# Patient Record
Sex: FEMALE | Race: WHITE | NOT HISPANIC OR LATINO | Employment: PART TIME | URBAN - METROPOLITAN AREA
[De-identification: names, ages, dates, MRNs, and addresses within clinical notes are randomized per-mention and may not be internally consistent; named-entity substitution may affect disease eponyms.]

---

## 2017-01-09 ENCOUNTER — CARE COORDINATION (OUTPATIENT)
Dept: CARDIOLOGY | Facility: CLINIC | Age: 37
End: 2017-01-09

## 2017-01-09 DIAGNOSIS — I42.8 ARRHYTHMOGENIC RIGHT VENTRICULAR DYSPLASIA (H): Primary | ICD-10-CM

## 2017-01-11 ENCOUNTER — TELEPHONE (OUTPATIENT)
Dept: CARDIOLOGY | Facility: CLINIC | Age: 37
End: 2017-01-11

## 2017-01-30 ENCOUNTER — CARE COORDINATION (OUTPATIENT)
Dept: CARDIOLOGY | Facility: CLINIC | Age: 37
End: 2017-01-30

## 2017-01-30 NOTE — PROGRESS NOTES
Patient states that her insurance changed and we are now out of network. She does not want follow-up at this time. Patient provided with clinic number is insurance changes.    Allison Jones RN, BSN  Cardiology Care Coordinator  [facility]  [email or web address]  [phone]

## 2017-05-26 ENCOUNTER — HEALTH MAINTENANCE LETTER (OUTPATIENT)
Age: 37
End: 2017-05-26

## 2018-03-14 NOTE — PROGRESS NOTES
SUBJECTIVE:   CC: Maren Bee is an 37 year old woman who presents for preventive health visit. Her PCP is Dr. Wilde though she has not been seen here for a couple years. She is due for a PAP test and has the following issues she would like to discuss:    1. Thyroid labs  2. Rash on bilateral arms and legs  3. Needs pap and a referral to cardiology    4. Acute Dermatitis:  Patient has a history of generally sensitive skin. She complains of an acute rash that started several days ago while she was in [place]. The rash is primarily located on the sun exposed areas. She has been using sunscreens and other new lotions. She was out in the sun more than normal on her vacation. Rash is red and itchy but not inflamed and painful.    5. Referral to cardiology:  Patient has seen cardiology in the past. She has a history of a brother who had an episode of aborted sudden cardiac death. He was subsequently found to have a are the sea with positive test for the DSP gene. The patient has also tested positive for the same mutation. Patient has had a cardiac MRI and Holter monitor in  I believe. She has not seen cardiology in the last year or two and would like a referral to return therefore evaluation.    GYN history:  Menarche:  Periods: regular, lasting 5days.  Flow described as light  no dysmenorrhea, no Dysparuneia  Currently sexually active: yes    Contraception: with vasectomy  Patient's last menstrual period was 2018 (exact date).  Vaginal symptoms: none  Concern for STD: no    Accepts/requests STD testing: declines  PAP test has been normal.      Healthy Habits:    Do you get at least three servings of calcium containing foods daily (dairy, green leafy vegetables, etc.)? yes    Amount of exercise or daily activities, outside of work: 20 min per day 4 days per week    Problems taking medications regularly No    Medication side effects: No    Have you had an eye exam in the past two years?  yes    Do you see a dentist twice per year? yes    Do you have sleep apnea, excessive snoring or daytime drowsiness?no        Today's PHQ-2 Score:   PHQ-2 ( 1999 [employer]) 4/2/2018 4/26/2016   Q1: Little interest or pleasure in doing things 0 0   Q2: Feeling down, depressed or hopeless 0 0   PHQ-2 Score 0 0       Patient Active Problem List    Diagnosis Date Noted     Hypothyroidism 03/15/2016     Priority: Medium     Arrhythmogenic left ventricular dysplasia (H) 02/22/2016     Priority: Medium     Seen on MRI  Cardiologist started Beta blocker, consider ICD, she is referred to EP specialist       Contact dermatitis 12/14/2012     Priority: Medium     Keratosis pilaris 12/14/2012     Priority: Medium     Cardiomyopathy, familial (H) 07/18/2012     Priority: Medium     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         Past Medical History:   Diagnosis Date     Cardiomyopathy, familial (H) 7/18/2012    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      Hypothyroidism        Past Surgical History:   Procedure Laterality Date     DENTAL SURGERY       INSERT INTRAUTERINE DEVICE  2011    mirena     Strabismus Surgery         Family History   Problem Relation Age of Onset     CANCER Maternal Grandfather      Bladder     Other - See Comments Mother 58     hyperlipidemia, hypothyroidism, ARVD gene +     Other - See Comments Father      hyperlipidemia, hypothyroidism     Lupus Maternal Grandmother      Other - See Comments Brother 32     (Amari) ARVD s/p sudden cardiac arrest     Cardiovascular Brother 30     (Young) MI at age 30; type 1 DM; ARVD gene negative;      Other - See Comments Other      Alcohol Abuse     DIABETES Daughter 3     type I       Social History   Substance Use Topics     Smoking status: Former Smoker     Years: 2.00     Types: Cigarettes     Quit date: 4/10/2001     Smokeless tobacco: Never Used      Comment: 4 cig per day     Alcohol use 1.2 oz/week     2 Standard drinks or  equivalent per week       Social History     Social History Narrative    Lives with  and 2 daughter.  Ages 8, 11.  The 11 year old has type 1 diabetes. One cat.        .Has a good support system.    Feels safe in all environments.    Wears seatbelt 100% of the time    Wears helmet while biking.    Denies history of abuse, past or present, physical, sexual or emotional.    04/02/18    Vale Ashraf PA-C           Current Outpatient Prescriptions   Medication Sig Dispense Refill     triamcinolone (KENALOG) 0.1 % cream Apply topically 2 times daily 80 g 1     levothyroxine (SYNTHROID/LEVOTHROID) 200 MCG tablet Take one daily; skip dose on Sunday 90 tablet 1     [DISCONTINUED] levothyroxine (SYNTHROID, LEVOTHROID) 175 MCG tablet Take one daily; skip dose on Sunday 30 tablet 0     metoprolol (TOPROL-XL) 25 MG 24 hr tablet Take 2 tablets (50 mg) by mouth daily (Patient not taking: Reported on 4/2/2018) 90 tablet 1     cholecalciferol (VITAMIN  -D) 1000 UNITS capsule Take 1 capsule (1,000 Units) by mouth daily 30 capsule                           Reviewed orders with patient.  Reviewed health maintenance and updated orders accordingly - Yes    Patient under age 50, mutual decision reflected in health maintenance.      Pertinent mammograms are reviewed under the imaging tab.  History of abnormal Pap smear: NO - age 30-65 PAP every 5 years with negative HPV co-testing recommended    Reviewed and updated as needed this visit by clinical staff  Tobacco  Allergies  Meds  Problems  Med Hx  Surg Hx  Fam Hx  Soc Hx          Reviewed and updated as needed this visit by Provider  Tobacco  Allergies  Meds  Problems  Med Hx  Surg Hx  Fam Hx  Soc Hx             ROS:  C: NEGATIVE for fever, chills, change in weight  I: NEGATIVE for worrisome rashes, moles or lesions  E: NEGATIVE for vision changes or irritation  ENT: NEGATIVE for ear, mouth and throat problems  R: NEGATIVE for significant cough or SOB  B: NEGATIVE  "for masses, tenderness or discharge  CV: NEGATIVE for chest pain, palpitations or peripheral edema  GI: NEGATIVE for nausea, abdominal pain, heartburn, or change in bowel habits  : NEGATIVE for unusual urinary or vaginal symptoms. Periods are regular.  M: NEGATIVE for significant arthralgias or myalgia  N: NEGATIVE for weakness, dizziness or paresthesias  E: NEGATIVE for temperature intolerance, skin/hair changes  H: NEGATIVE for bleeding problems  P: NEGATIVE for changes in mood or affect    OBJECTIVE:   BP 98/63 (BP Location: Right arm, Patient Position: Chair, Cuff Size: Adult Regular)  Pulse 76  Temp 97.7  F (36.5  C) (Temporal)  Ht 5' 5.47\" (166.3 cm)  Wt 143 lb 12 oz (65.2 kg)  LMP 03/21/2018 (Exact Date)  SpO2 100%  BMI 23.58 kg/m2  EXAM:  GENERAL: healthy, alert and no distress  EYES: Eyes grossly normal to inspection, PERRL and conjunctivae and sclerae normal  HENT: ear canals and TM's normal, nose and mouth without ulcers or lesions  NECK: no adenopathy, no asymmetry, masses, or scars and thyroid normal to palpation  RESP: lungs clear to auscultation - no rales, rhonchi or wheezes  BREAST: normal without masses, tenderness or nipple discharge and no palpable axillary masses or adenopathy  CV: regular rate and rhythm, normal S1 S2, no S3 or S4, no murmur, click or rub, no peripheral edema and peripheral pulses strong  ABDOMEN: soft, nontender, no hepatosplenomegaly, no masses and bowel sounds normal   (female): normal female external genitalia, normal urethral meatus, vaginal mucosa pink, moist, well rugated, and normal cervix/adnexa/uterus without masses or discharge  MS: no gross musculoskeletal defects noted, no edema  SKIN: Macula papular rash with areas of confluence primarily noted on sun exposed areas upper and lower extremities back of patient's neck and on shoulders. Significant coalescence in popliteal fossa bilaterally. Mild scaling noted. No associated pain lymphangitis or " "lymphadenapathy  NEURO: Normal strength and tone, mentation intact and speech normal  PSYCH: mentation appears normal, affect normal/bright  LYMPH: no cervical, supraclavicular, axillary, or inguinal adenopathy    ASSESSMENT/PLAN:       ICD-10-CM    1. Encounter for routine adult health examination without abnormal findings Z00.00    2. Acquired hypothyroidism E03.9 TSH with free T4 reflex     TSH with free T4 reflex   3. Other atopic dermatitis L20.89 triamcinolone (KENALOG) 0.1 % cream   4. Cardiomyopathy, familial (H) I42.9 CARDIOLOGY EVAL ADULT REFERRAL   5. Screening for cervical cancer Z12.4 Pap imaged thin layer screen with HPV - recommended age 30 - 65     HPV High Risk Types DNA Cervical   6. Screening for lipid disorders Z13.220 Lipid Panel ([employer])   7. Arrhythmogenic left ventricular dysplasia (H) I42.8 CARDIOLOGY EVAL ADULT REFERRAL   8. Breast screening Z12.31 Mammogram - routine screening     Related to contact dermatitis from sunscreens or possibly a solar keratosis.Recommended she used triamcinolone and can also take Zyrtec 10 mg once daily.    COUNSELING:   Reviewed preventive health counseling, as reflected in patient instructions  Special attention given to:        Regular exercise       Healthy diet/nutrition       Vision screening       Immunizations    Reviewed  Breast cancer screening reviewed. Patient requests screening after age 40 due to history of thick fibrous breasts.       Contraception       Osteoporosis Prevention/Bone Health          reports that she quit smoking about 16 years ago. Her smoking use included Cigarettes. She quit after 2.00 years of use. She has never used smokeless tobacco.    Estimated body mass index is 23.58 kg/(m^2) as calculated from the following:    Height as of this encounter: 5' 5.47\" (166.3 cm).    Weight as of this encounter: 143 lb 12 oz (65.2 kg).       Counseling Resources:  ATP IV Guidelines  Pooled Cohorts Equation Calculator  Breast Cancer Risk " Calculator  FRAX Risk Assessment  ICSI Preventive Guidelines  Dietary Guidelines for Americans, 2010  USDA's MyPlate  ASA Prophylaxis  Lung CA Screening    Betty Ashraf PA-C  [facility]

## 2018-03-14 NOTE — PATIENT INSTRUCTIONS
Welcome to [facility], where we are committed to the art of inspired primary care.  Thank you for choosing us to be a part of your well-being.    The clinic is open Monday through Friday, 8:00 a.m. - 5:00 p.m., and Saturdays from 8:00 a.m. - 12:00 p.m.  After-hours questions are directed to our 24-hour nurse line, which can be reached by calling the clinic at [phone].  You can also contact the clinic through [employer], our online patient portal.  (Please allow 1-2 business days for a response via [employer].)  If you are not already enrolled in [employer], access instructions are below.    If you need a refill on your prescription, please contact the pharmacy where you filled it, and they will contact our clinic with the details of what is needed.  If your prescription is a controlled substance, you will have a conversation with your provider to determine if you would like to  your prescription at the clinic or have it mailed to your pharmacy.  Please allow 2-3 business days for all refill requests to be handled.    We look forward to providing you with great care!  Please let me know if you have any questions.  Thank you for allowing me to participate in your care.  It was my pleasure meeting you.  Vale Ashraf PA-C    Preventive Health Recommendations  Female Ages 26 - 39  Yearly exam:   See your health care provider every year in order to    Review health changes.     Discuss preventive care.      Review your medicines if you your doctor has prescribed any.    Until age 30: Get a Pap test every three years (more often if you have had an abnormal result).    After age 30: Talk to your doctor about whether you should have a Pap test every 3 years or have a Pap test with HPV screening every 5 years.   You do not need a Pap test if your uterus was removed (hysterectomy) and you have not had cancer.  You should be tested each year for STDs (sexually transmitted diseases), if  you're at risk.   Talk to your provider about how often to have your cholesterol checked.  If you are at risk for diabetes, you should have a diabetes test (fasting glucose).  Shots: Get a flu shot each year. Get a tetanus shot every 10 years.   Nutrition:     Eat at least 5 servings of fruits and vegetables each day.    Eat whole-grain bread, whole-wheat pasta and brown rice instead of white grains and rice.    Talk to your provider about Calcium and Vitamin D.     Lifestyle    Exercise at least 150 minutes a week (30 minutes a day, 5 days of the week). This will help you control your weight and prevent disease.    Limit alcohol to one drink per day.    No smoking.     Wear sunscreen to prevent skin cancer.    See your dentist every six months for an exam and cleaning.

## 2018-03-15 ENCOUNTER — TELEPHONE (OUTPATIENT)
Dept: FAMILY MEDICINE | Facility: CLINIC | Age: 38
End: 2018-03-15

## 2018-03-15 NOTE — TELEPHONE ENCOUNTER
I called and left message that we will draw blood at her appointment and then Betty Ashraf will get back to her with in 24 hours with the results.  Chloe CABRAL  [facility]  March 15, 2018 2:47 PM      ----- Message from Gayle Joaquin sent at 3/14/2018 12:20 PM CDT -----  Regarding: Pt has an upcoming appt on 04/02/18 and would like lab orders to get them done before the appt  Contact: [phone]  Pt has an upcoming appt on 04/02/18 and would like lab orders to get them done before the appt.  [facility] pt [facility] she wants them done before appt so they can adjust her thyroid medication correctly.  Pt wants a call back at the number listed above    Thank you,    Laura BARAKAT  Call Ctr    Please DO NOT send this message and/or reply back to sender.  Call Center Representatives DO NOT respond to messages.

## 2018-04-02 ENCOUNTER — OFFICE VISIT (OUTPATIENT)
Dept: FAMILY MEDICINE | Facility: CLINIC | Age: 38
End: 2018-04-02
Payer: COMMERCIAL

## 2018-04-02 VITALS
SYSTOLIC BLOOD PRESSURE: 98 MMHG | HEIGHT: 65 IN | HEART RATE: 76 BPM | BODY MASS INDEX: 23.95 KG/M2 | OXYGEN SATURATION: 100 % | TEMPERATURE: 97.7 F | DIASTOLIC BLOOD PRESSURE: 63 MMHG | WEIGHT: 143.75 LBS

## 2018-04-02 DIAGNOSIS — I42.9 CARDIOMYOPATHY, FAMILIAL (H): ICD-10-CM

## 2018-04-02 DIAGNOSIS — I42.8 ARRHYTHMOGENIC LEFT VENTRICULAR DYSPLASIA (H): ICD-10-CM

## 2018-04-02 DIAGNOSIS — Z12.4 SCREENING FOR CERVICAL CANCER: ICD-10-CM

## 2018-04-02 DIAGNOSIS — Z12.39 BREAST SCREENING: ICD-10-CM

## 2018-04-02 DIAGNOSIS — E78.5 HYPERLIPIDEMIA WITH TARGET LDL LESS THAN 160: ICD-10-CM

## 2018-04-02 DIAGNOSIS — E03.9 ACQUIRED HYPOTHYROIDISM: ICD-10-CM

## 2018-04-02 DIAGNOSIS — Z13.220 SCREENING FOR LIPID DISORDERS: ICD-10-CM

## 2018-04-02 DIAGNOSIS — L20.89 OTHER ATOPIC DERMATITIS: ICD-10-CM

## 2018-04-02 DIAGNOSIS — Z00.00 ENCOUNTER FOR ROUTINE ADULT HEALTH EXAMINATION WITHOUT ABNORMAL FINDINGS: Primary | ICD-10-CM

## 2018-04-02 LAB
CHOLEST SERPL-MCNC: 345 MG/DL (ref 0–200)
CHOLEST/HDLC SERPL: 3.9 {RATIO} (ref 0–5)
FASTING SPECIMEN: YES
HDLC SERPL-MCNC: 89 MG/DL
LDLC SERPL CALC-MCNC: 240 MG/DL (ref 0–129)
TRIGL SERPL-MCNC: 82 MG/DL (ref 0–150)
VLDL-CHOLESTEROL: 16 (ref 7–32)

## 2018-04-02 RX ORDER — TRIAMCINOLONE ACETONIDE 1 MG/G
CREAM TOPICAL 2 TIMES DAILY
Qty: 80 G | Refills: 1 | Status: SHIPPED | OUTPATIENT
Start: 2018-04-02 | End: 2022-01-07

## 2018-04-02 RX ORDER — LEVOTHYROXINE SODIUM 200 UG/1
TABLET ORAL
Qty: 90 TABLET | Refills: 1 | Status: SHIPPED | OUTPATIENT
Start: 2018-04-02 | End: 2018-09-21

## 2018-04-02 ASSESSMENT — PAIN SCALES - GENERAL: PAINLEVEL: NO PAIN (0)

## 2018-04-02 NOTE — NURSING NOTE
"37 year old  Chief Complaint   Patient presents with     [facility] Care     Physical     Wants her thyroid checked and rash on bilateral arms and legs.       Blood pressure 98/63, pulse 76, temperature 97.7  F (36.5  C), temperature source Temporal, height 5' 5.47\" (166.3 cm), weight 143 lb 12 oz (65.2 kg), last menstrual period 03/21/2018, SpO2 100 %, not currently breastfeeding. Body mass index is 23.58 kg/(m^2).  Patient Active Problem List   Diagnosis     Cardiomyopathy, familial (H)     Contact dermatitis     Keratosis pilaris     Arrhythmogenic left ventricular dysplasia (H)     Hypothyroidism       Wt Readings from Last 2 Encounters:   04/02/18 143 lb 12 oz (65.2 kg)   04/26/16 139 lb (63 kg)     BP Readings from Last 3 Encounters:   04/02/18 98/63   04/26/16 107/79   02/12/16 99/62         Current Outpatient Prescriptions   Medication     levothyroxine (SYNTHROID, LEVOTHROID) 175 MCG tablet     metoprolol (TOPROL-XL) 25 MG 24 hr tablet     cholecalciferol (VITAMIN  -D) 1000 UNITS capsule     No current facility-administered medications for this visit.        Social History   Substance Use Topics     Smoking status: Former Smoker     Years: 2.00     Types: Cigarettes     Quit date: 4/10/2001     Smokeless tobacco: Never Used      Comment: 4 cig per day     Alcohol use Yes      Comment: occ.       Health Maintenance Due   Topic Date Due     PAP Q5 YEARS  07/07/2008     HPV Q5 YEARS (Complete with PAP)  07/07/2008     PAP Q3 YR  03/06/2015     TSH W/ FREE T4 REFLEX Q1 YEAR  03/15/2017       No results found for: PAP    Linsey Obrien MA  April 2, 2018 8:59 AM    "

## 2018-04-02 NOTE — MR AVS SNAPSHOT
After Visit Summary   4/2/2018    Maren Bee    MRN: [id number]           Patient Information     Date Of Birth          1980        Visit Information        Provider Department      4/2/2018 8:40 AM Betty Ashraf PA-C [facility]        Today's Diagnoses     Encounter for routine adult health examination without abnormal findings    -  1    Acquired hypothyroidism        Other atopic dermatitis        Screening for cervical cancer        Screening for lipid disorders        Cardiomyopathy, familial (H)        Arrhythmogenic left ventricular dysplasia (H)        Breast screening        Hypothyroidism, unspecified type          Care Instructions    Welcome to [facility], where we are committed to the art of [facility] primary care.  Thank you for choosing us to be a part of your well-being.    The clinic is open Monday through Friday, 8:00 a.m. - 5:00 p.m., and Saturdays from 8:00 a.m. - 12:00 p.m.  After-hours questions are directed to our 24-hour nurse line, which can be reached by calling the clinic at [phone].  You can also contact the clinic through [email or web address], our online patient portal.  (Please allow 1-2 business days for a response via [email or web address].)  If you are not already enrolled in [email or web address], access instructions are below.    If you need a refill on your prescription, please contact the pharmacy where you filled it, and they will contact our clinic with the details of what is needed.  If your prescription is a controlled substance, you will have a conversation with your provider to determine if you would like to  your prescription at the clinic or have it mailed to your pharmacy.  Please allow 2-3 business days for all refill requests to be handled.    We look forward to providing you with great care!  Please let me know if you have any questions.  Thank you for allowing me to participate in your care.  It was my pleasure meeting  you.  Vale Ashraf PA-C    Preventive Health Recommendations  Female Ages 26 - 39  Yearly exam:   See your health care provider every year in order to    Review health changes.     Discuss preventive care.      Review your medicines if you your doctor has prescribed any.    Until age 30: Get a Pap test every three years (more often if you have had an abnormal result).    After age 30: Talk to your doctor about whether you should have a Pap test every 3 years or have a Pap test with HPV screening every 5 years.   You do not need a Pap test if your uterus was removed (hysterectomy) and you have not had cancer.  You should be tested each year for STDs (sexually transmitted diseases), if you're at risk.   Talk to your provider about how often to have your cholesterol checked.  If you are at risk for diabetes, you should have a diabetes test (fasting glucose).  Shots: Get a flu shot each year. Get a tetanus shot every 10 years.   Nutrition:     Eat at least 5 servings of fruits and vegetables each day.    Eat whole-grain bread, whole-wheat pasta and brown rice instead of white grains and rice.    Talk to your provider about Calcium and Vitamin D.     Lifestyle    Exercise at least 150 minutes a week (30 minutes a day, 5 days of the week). This will help you control your weight and prevent disease.    Limit alcohol to one drink per day.    No smoking.     Wear sunscreen to prevent skin cancer.    See your dentist every six months for an exam and cleaning.            Follow-ups after your visit        Additional Services     CARDIOLOGY EVAL ADULT REFERRAL       Preferred location:  [facility] [phone]   [email or web address]    Patient would like to re-establish care.  Has seen Dr. Medrano in the past.    Please be aware that coverage of these services is subject to the terms and limitations of your health insurance plan.  Call member  "services at your health plan with any benefit or coverage questions.      Please bring the following to your appointment:  Any x-rays, CTs or MRIs which have been performed. Contact the facility where they were done to arrange for  prior to your scheduled appointment.    List of current medications  This referral request   Any documents/labs given to you for this referral                  Future tests that were ordered for you today     Open Future Orders        Priority Expected Expires Ordered    TSH with free T4 reflex Routine 5/7/2018 4/2/2019 4/2/2018    Mammogram - routine screening Routine  4/2/2019 4/2/2018            Who to contact     Please call your clinic at [phone] to:    Ask questions about your health    Make or cancel appointments    Discuss your medicines    Learn about your test results    Speak to your doctor            Additional Information About Your Visit        Improveit! 360 Information     Improveit! 360 gives you secure access to your electronic health record. If you see a primary care provider, you can also send messages to your care team and make appointments. If you have questions, please call your primary care clinic.  If you do not have a primary care provider, please call [phone] and they will assist you.      Improveit! 360 is an electronic gateway that provides easy, online access to your medical records. With Improveit! 360, you can request a clinic appointment, read your test results, renew a prescription or communicate with your care team.     To access your existing account, please contact your [facility] or call [phone] for assistance.        Care EveryWhere ID     This is your Care EveryWhere ID. This could be used by other organizations to access your [facility] medical records  [id number]        Your Vitals Were     Pulse Temperature Height Last Period Pulse Oximetry BMI (Body Mass Index)    76 97.7  F (36.5  C) (Temporal) 5' 5.47\" (166.3 cm) " 03/21/2018 (Exact Date) 100% 23.58 kg/m2       Blood Pressure from Last 3 Encounters:   04/02/18 98/63   04/26/16 107/79   02/12/16 99/62    Weight from Last 3 Encounters:   04/02/18 143 lb 12 oz (65.2 kg)   04/26/16 139 lb (63 kg)   02/12/16 138 lb (62.6 kg)              We Performed the Following     CARDIOLOGY EVAL ADULT REFERRAL     HPV High Risk Types DNA Cervical     Lipid Panel (LabDAQ)     Pap imaged thin layer screen with HPV - recommended age 30 - 65     TSH with free T4 reflex          Today's Medication Changes          These changes are accurate as of 4/2/18 10:51 AM.  If you have any questions, ask your nurse or doctor.               Start taking these medicines.        Dose/Directions    triamcinolone 0.1 % cream   Commonly known as:  KENALOG   Used for:  Other atopic dermatitis   Started by:  Betty Ashraf PA-C        Apply topically 2 times daily   Quantity:  80 g   Refills:  1         These medicines have changed or have updated prescriptions.        Dose/Directions    levothyroxine 200 MCG tablet   Commonly known as:  SYNTHROID/LEVOTHROID   This may have changed:  medication strength   Used for:  Hypothyroidism, unspecified type   Changed by:  Betty Ashraf PA-C        Take one daily; skip dose on Sunday   Quantity:  90 tablet   Refills:  1         Stop taking these medicines if you haven't already. Please contact your care team if you have questions.     amoxicillin-clavulanate 875-125 MG per tablet   Commonly known as:  AUGMENTIN   Stopped by:  Betty Ashraf PA-C           pseudoePHEDrine 30 MG tablet   Commonly known as:  SUDAFED   Stopped by:  Betty Ashraf PA-C                Where to get your medicines      These medications were sent to [facility]/pharmacy #[id number] - [place], [place] [facility] [address] [facility] [address]     Phone:  [phone]     levothyroxine 200 MCG tablet    triamcinolone 0.1 % cream                Primary  Care Provider Office Phone # Fax #    Betty Ashraf PA-C [phone] [address] [place] [address]        Equal Access to Services     ELEN GATICA : Hadii john robertson dianao Sobrianali, waaxda luqadaha, qaybta kaalmada prudence, edwin healy ruben chamberlain. So [facility] [phone].    ATENCIÓN: Si habla español, tiene a arias disposición servicios gratuitos de asistencia lingüística. Llame al [phone].    We comply with applicable federal civil rights laws and [place] laws. We do not discriminate on the basis of race, color, national origin, age, disability, sex, sexual orientation, or gender identity.            Thank you!     Thank you for choosing [facility]  for your care. Our goal is always to provide you with excellent care. Hearing back from our patients is one way we can continue to improve our services. Please take a few minutes to complete the written survey that you may receive in the mail after your visit with us. Thank you!             Your Updated Medication List - Protect others around you: Learn how to safely use, store and throw away your medicines at www.disposemymeds.org.          This list is accurate as of 4/2/18 10:51 AM.  Always use your most recent med list.                   Brand Name Dispense Instructions for use Diagnosis    cholecalciferol 1000 UNITS capsule    vitamin  -D    30 capsule    Take 1 capsule (1,000 Units) by mouth daily        levothyroxine 200 MCG tablet    SYNTHROID/LEVOTHROID    90 tablet    Take one daily; skip dose on Sunday    Hypothyroidism, unspecified type       metoprolol succinate 25 MG 24 hr tablet    TOPROL-XL    90 tablet    Take 2 tablets (50 mg) by mouth daily    Arrhythmogenic right ventricular dysplasia (H)       triamcinolone 0.1 % cream    KENALOG    80 g    Apply topically 2 times daily    Other atopic dermatitis

## 2018-04-03 LAB
T4 FREE SERPL-MCNC: 0.37 NG/DL (ref 0.76–1.46)
TSH SERPL DL<=0.005 MIU/L-ACNC: 162 MU/L (ref 0.4–4)

## 2018-04-04 LAB
COPATH REPORT: ABNORMAL
PAP: ABNORMAL

## 2018-04-06 LAB
FINAL DIAGNOSIS: NORMAL
HPV HR 12 DNA CVX QL NAA+PROBE: NEGATIVE
HPV16 DNA SPEC QL NAA+PROBE: NEGATIVE
HPV18 DNA SPEC QL NAA+PROBE: NEGATIVE
SPECIMEN DESCRIPTION: NORMAL
SPECIMEN SOURCE CVX/VAG CYTO: NORMAL

## 2018-05-07 ENCOUNTER — TRANSFERRED RECORDS (OUTPATIENT)
Dept: HEALTH INFORMATION MANAGEMENT | Facility: CLINIC | Age: 38
End: 2018-05-07

## 2018-06-12 ENCOUNTER — MYC MEDICAL ADVICE (OUTPATIENT)
Dept: FAMILY MEDICINE | Facility: CLINIC | Age: 38
End: 2018-06-12

## 2018-06-12 ENCOUNTER — CARE COORDINATION (OUTPATIENT)
Dept: CARDIOLOGY | Facility: CLINIC | Age: 38
End: 2018-06-12

## 2018-06-12 DIAGNOSIS — R00.2 PALPITATIONS: ICD-10-CM

## 2018-06-12 DIAGNOSIS — E03.9 ACQUIRED HYPOTHYROIDISM: ICD-10-CM

## 2018-06-12 DIAGNOSIS — I42.8 ARRHYTHMOGENIC LEFT VENTRICULAR DYSPLASIA (H): Primary | ICD-10-CM

## 2018-06-12 DIAGNOSIS — E78.5 HYPERLIPIDEMIA LDL GOAL <70: ICD-10-CM

## 2018-06-12 DIAGNOSIS — E78.5 HYPERLIPIDEMIA WITH TARGET LDL LESS THAN 160: ICD-10-CM

## 2018-06-12 DIAGNOSIS — E03.9 HYPOTHYROIDISM, UNSPECIFIED TYPE: Primary | ICD-10-CM

## 2018-06-12 LAB
ALT SERPL-CCNC: 15 U/L (ref 0–50)
AST SERPL-CCNC: 27 U/L (ref 0–45)
CHOLEST SERPL-MCNC: 242 MG/DL (ref 0–200)
CHOLEST/HDLC SERPL: 3.6 {RATIO} (ref 0–5)
FASTING SPECIMEN: YES
GLUCOSE SERPL-MCNC: 96 MG/DL (ref 60–109)
HDLC SERPL-MCNC: 68 MG/DL
LDLC SERPL CALC-MCNC: 163 MG/DL (ref 0–129)
T4 FREE SERPL-MCNC: 0.98 NG/DL (ref 0.76–1.46)
TRIGL SERPL-MCNC: 56 MG/DL (ref 0–150)
TSH SERPL DL<=0.005 MIU/L-ACNC: 21.8 MU/L (ref 0.4–4)
VLDL-CHOLESTEROL: 11 (ref 7–32)

## 2018-06-12 RX ORDER — ATORVASTATIN CALCIUM 20 MG/1
20 TABLET, FILM COATED ORAL DAILY
Qty: 90 TABLET | Refills: 3 | Status: SHIPPED | OUTPATIENT
Start: 2018-06-12 | End: 2019-05-07

## 2018-06-12 NOTE — PROGRESS NOTES
Date: 6/12/2018    Time of Call: 12:33 PM     Diagnosis:  Patient called to schedule follow up, she has a recent history of palpitations with a visit to [facility] ER - records in Care Everywhere. Reviewed her records with Dr. Medrano.     [ TORB ] Ordering provider: Dr. Haylie Medrano   Order: Start Lipitor 20 mg for recent Lipid panel. Wear Ziopatch 14 day monitor for recent palpitations. Have cardiac MRI with stress. Follow up in [facility] clinic.      Order received by: Allison Jones      Follow-up/additional notes: Patient called and is agreeable to all recommendations. Meds sent to her preferred pharmacy. Will call if her palpitations worsen or become more symptomatic. Will work on MRI and appt with Dr. Medrano.    Allison Jones, RN, BSN  Cardiology Care Coordinator  [facility]  [email or web address]  [phone]

## 2018-06-14 ENCOUNTER — DOCUMENTATION ONLY (OUTPATIENT)
Dept: CARDIOLOGY | Facility: CLINIC | Age: 38
End: 2018-06-14

## 2018-06-14 NOTE — PROGRESS NOTES
Per patient request, on June 12, 2018 a Zio patch was mailed to the patient via clinic.  The patient is to wear the zio as soon as it arrives.    Arely Goddard  Periop Electrophysiology   [phone]

## 2018-06-15 ENCOUNTER — ALLIED HEALTH/NURSE VISIT (OUTPATIENT)
Dept: CARDIOLOGY | Facility: CLINIC | Age: 38
End: 2018-06-15
Attending: INTERNAL MEDICINE
Payer: COMMERCIAL

## 2018-06-15 DIAGNOSIS — I42.8 ARRHYTHMOGENIC LEFT VENTRICULAR DYSPLASIA (H): ICD-10-CM

## 2018-06-15 PROCEDURE — 0298T ZZC EXT ECG > 48HR TO 21 DAY REVIEW AND INTERPRETATN: CPT | Performed by: INTERNAL MEDICINE

## 2018-06-15 NOTE — MR AVS SNAPSHOT
After Visit Summary   6/15/2018    Maren Bee    MRN: [id number]           Patient Information     Date Of Birth          1980        Visit Information        Provider Department      6/15/2018 7:00 AM Tech, Uc Cvc Monitor, [facility]        Today's Diagnoses     Arrhythmogenic left ventricular dysplasia (H)           Follow-ups after your visit        Your next 10 appointments already scheduled     Jun 22, 2018  7:00 AM CDT   MR CARDIAC W CONTRAST STRESS AND FLOW with UUMR4, UU CV MR NURSE   [facility] [place], [facility] ([facility], [facility])    [address] [address] [phone]           Take your medicines as usual, unless your doctor tells you not to.   If you take Viagra, Levitra or Cialis, stop taking it 48 hours before your test.   If you take Aggrenox or dipyridamole (Persantine, Permole), stop taking it 48 hours before your test.   If you take Theophylline or Aminophylline, stop taking it 12 hours before your test.   If you are diabetic and take oral hypoglycemics, do not take them on the day of your test.  The day before your exam, drink extra fluids at least six 8-ounce glasses (unless your doctor wants you to limit your fluids).  Stop all caffeine 12 hours before the test. This includes coffee, tea, soda, chocolate and certain medicines (such as Anacin, Excedrin and NoDoz). Also avoid decaf coffee and tea, as these contain small amounts of caffeine.  Do not eat or drink for 3 hours before your exam. You may drink water and take your morning medicines.  You may need a blood test (creatinine test) within 30 days of your exam. Follow your doctor s orders if this is arranged before your exam.  If you are very claustrophobic, please let you doctor know. You may get a sedative medicine from your doctor before you arrive. Bring the medicine to the exam. Do not take it at home. Arrive one hour early. Bring  someone who can take you home after the test. Your medicine will make you sleepy. After the exam, you may not drive, take a bus or take a taxi by yourself.  The MRI machine uses a strong magnet. Please wear clothes without metal (snaps, zippers). A sweatsuit works well, or we may give you a hospital gown.  Please remove any body piercings and hair extensions before you arrive. You will remove watches, jewelry, hairpins, wallets, dentures, partial dental plates and hearing aids. You may wear contact lenses, and you may be able to wear your rings. We have a safe place to keep your personal items, but it is safer to leave them at home.  **IMPORTANT** THE INSTRUCTIONS BELOW ARE ONLY FOR THOSE PATIENTS WHO HAVE BEEN PRESCRIBED SEDATION OR GENERAL ANESTHESIA DURING THEIR MRI PROCEDURE:  IF YOUR DOCTOR PRESCRIBED ORAL SEDATION (take medicine to help you relax during your exam):   You must get the medicine from your doctor (oral medication) before you arrive. Bring the medicine to the exam. Do not take it at home. You ll be told when to take it upon arriving for your exam.   Arrive one hour early. Bring someone who can take you home after the test. Your medicine will make you sleepy. After the exam, you may not drive, take a bus or take a taxi by yourself.  IF YOUR DOCTOR PRESCRIBED IV SEDATION:   Arrive one hour early. Bring someone who can take you home after the test. Your medicine will make you sleepy. After the exam, you may not drive, take a bus or take a taxi by yourself.   No eating 6 hours before your exam. You may have clear liquids up until 4 hours before your exam. (Clear liquids include water, clear tea, black coffee and fruit juice without pulp.)  IF YOUR DOCTOR PRESCRIBED ANESTHESIA (be asleep for your exam):   Arrive 1 1/2 hours early. Bring someone who can take you home after the test. You may not drive, take a bus or take a taxi by yourself.   No eating 8 hours before your exam. You may have clear liquids  "up until 4 hours before your exam. (Clear liquids include water, clear tea, black coffee and fruit juice without pulp.)   You will spend four to five hours in the recovery room.  If you have any questions, please contact your Imaging Department exam site.            Jul 06, 2018  3:30 PM CDT   Lab with UC LAB   [facility] ([facility])    [address]  1st Floor  [facility] [id number] [phone]            Jul 06, 2018  4:00 PM CDT   (Arrive by 3:45 PM)   Return Genetic with Haylie Medrano MD   [facility] ([facility])    [address]  [address]  [facility] [id number] [phone]            Jul 18, 2018  9:30 AM CDT   MA SCREENING DIGITAL BILATERAL with MGMA1, MG MA TECH   [facility] ([facility])    [address] [place] [address] [address]  [facility] [address] [phone]           Do not use any powder, lotion or deodorant under your arms or on your breast. If you do, we will ask you to remove it before your exam.  Wear comfortable, two-piece clothing.  If you have any allergies, tell your care team.  Bring any previous mammograms from other facilities or have them mailed to the breast center. Three-dimensional (3D) mammograms are available at [facility] locations in [facility], [facility], [facility], and [place]. [facility] locations include [place] and [facility] in [place]. Benefits of 3D mammograms include: - Improved rate of cancer detection - Decreases your chance of having to go back for more tests, which means fewer: - \"False-positive\" results (This means that there is an abnormal area but it isn't cancer.) - Invasive testing procedures, such as a biopsy or surgery - Can provide clearer images of the breast if you have dense breast tissue. 3D mammography is an optional exam that anyone can have with a 2D mammogram. It doesn't replace " or take the place of a 2D mammogram. 2D mammograms remain an effective screening test for all women.  Not all insurance companies cover the cost of a 3D mammogram. Check with your insurance.              Who to contact     If you have questions or need follow up information about today's clinic visit or your schedule please contact [facility] directly at [phone].  Normal or non-critical lab and imaging results will be communicated to you by [facility], letter or phone within 4 business days after the clinic has received the results. If you do not hear from us within 7 days, please contact the clinic through Beam.t or phone. If you have a critical or abnormal lab result, we will notify you by phone as soon as possible.  Submit refill requests through ZeeWhere or call your pharmacy and they will forward the refill request to us. Please allow 3 business days for your refill to be completed.          Additional Information About Your Visit        [facility] ServicesharKPA Information     ZeeWhere gives you secure access to your electronic health record. If you see a primary care provider, you can also send messages to your care team and make appointments. If you have questions, please call your primary care clinic.  If you do not have a primary care provider, please call [phone] and they will assist you.        Care EveryWhere ID     This is your Care EveryWhere ID. This could be used by other organizations to access your [facility] medical records  [id number]         Blood Pressure from Last 3 Encounters:   04/02/18 98/63   04/26/16 107/79   02/12/16 99/62    Weight from Last 3 Encounters:   04/02/18 65.2 kg (143 lb 12 oz)   04/26/16 63 kg (139 lb)   02/12/16 62.6 kg (138 lb)              We Performed the Following     Ziopatch Holter Monitor - Adult        Primary Care Provider Office Phone # Fax #    Betty Ashraf PA-C [phone]       [address] [place] [address]        Equal Access to  Services     [facility]: Hadii aad ku hadduanehakan Keyes, waaxda luqadaha, qaybta kaalmada prudence, edwin miranda . So [facility] [phone].    ATENCIÓN: Si habla yojana, tiene a arias disposición servicios gratuitos de asistencia lingüística. Llame al [phone].    We comply with applicable federal civil rights laws and [place] laws. We do not discriminate on the basis of race, color, national origin, age, disability, sex, sexual orientation, or gender identity.            Thank you!     Thank you for choosing [facility]  for your care. Our goal is always to provide you with excellent care. Hearing back from our patients is one way we can continue to improve our services. Please take a few minutes to complete the written survey that you may receive in the mail after your visit with us. Thank you!             Your Updated Medication List - Protect others around you: Learn how to safely use, store and throw away your medicines at www.disposemymeds.org.          This list is accurate as of 6/15/18  7:10 AM.  Always use your most recent med list.                   Brand Name Dispense Instructions for use Diagnosis    atorvastatin 20 MG tablet    LIPITOR    90 tablet    Take 1 tablet (20 mg) by mouth daily    Arrhythmogenic left ventricular dysplasia (H), Palpitations, Hyperlipidemia LDL goal <70       cholecalciferol 1000 units capsule    vitamin  -D    30 capsule    Take 1 capsule (1,000 Units) by mouth daily        levothyroxine 200 MCG tablet    SYNTHROID/LEVOTHROID    90 tablet    Take one daily; skip dose on Sunday        triamcinolone 0.1 % cream    KENALOG    80 g    Apply topically 2 times daily    Other atopic dermatitis

## 2018-06-22 ENCOUNTER — HOSPITAL ENCOUNTER (OUTPATIENT)
Dept: MRI IMAGING | Facility: CLINIC | Age: 38
Discharge: HOME | End: 2018-06-22
Attending: INTERNAL MEDICINE | Admitting: INTERNAL MEDICINE
Payer: COMMERCIAL

## 2018-06-22 VITALS
DIASTOLIC BLOOD PRESSURE: 61 MMHG | OXYGEN SATURATION: 100 % | SYSTOLIC BLOOD PRESSURE: 102 MMHG | RESPIRATION RATE: 16 BRPM

## 2018-06-22 DIAGNOSIS — I42.8 ARRHYTHMOGENIC LEFT VENTRICULAR DYSPLASIA (H): ICD-10-CM

## 2018-06-22 DIAGNOSIS — R00.2 PALPITATIONS: ICD-10-CM

## 2018-06-22 LAB — INTERPRETATION ECG - MUSE: NORMAL

## 2018-06-22 PROCEDURE — 93017 CV STRESS TEST TRACING ONLY: CPT

## 2018-06-22 PROCEDURE — A9585 GADOBUTROL INJECTION: HCPCS | Performed by: INTERNAL MEDICINE

## 2018-06-22 PROCEDURE — 75563 CARD MRI W/STRESS IMG & DYE: CPT | Mod: 26 | Performed by: INTERNAL MEDICINE

## 2018-06-22 PROCEDURE — 75563 CARD MRI W/STRESS IMG & DYE: CPT

## 2018-06-22 PROCEDURE — 25000128 H RX IP 250 OP 636: Performed by: INTERNAL MEDICINE

## 2018-06-22 PROCEDURE — 40000065 ZZH STATISTIC EKG NON-CHARGEABLE

## 2018-06-22 PROCEDURE — 93018 CV STRESS TEST I&R ONLY: CPT | Performed by: INTERNAL MEDICINE

## 2018-06-22 PROCEDURE — 93005 ELECTROCARDIOGRAM TRACING: CPT

## 2018-06-22 PROCEDURE — 93016 CV STRESS TEST SUPVJ ONLY: CPT | Performed by: INTERNAL MEDICINE

## 2018-06-22 RX ORDER — DIAZEPAM 5 MG
5 TABLET ORAL EVERY 30 MIN PRN
Status: DISCONTINUED | OUTPATIENT
Start: 2018-06-22 | End: 2018-06-23 | Stop reason: HOSPADM

## 2018-06-22 RX ORDER — ALBUTEROL SULFATE 90 UG/1
2 AEROSOL, METERED RESPIRATORY (INHALATION) EVERY 5 MIN PRN
Status: DISCONTINUED | OUTPATIENT
Start: 2018-06-22 | End: 2018-06-23 | Stop reason: HOSPADM

## 2018-06-22 RX ORDER — ACYCLOVIR 200 MG/1
0-1 CAPSULE ORAL
Status: DISCONTINUED | OUTPATIENT
Start: 2018-06-22 | End: 2018-06-23 | Stop reason: HOSPADM

## 2018-06-22 RX ORDER — REGADENOSON 0.08 MG/ML
0.4 INJECTION, SOLUTION INTRAVENOUS ONCE
Status: COMPLETED | OUTPATIENT
Start: 2018-06-22 | End: 2018-06-22

## 2018-06-22 RX ORDER — GADOBUTROL 604.72 MG/ML
7.5 INJECTION INTRAVENOUS ONCE
Status: COMPLETED | OUTPATIENT
Start: 2018-06-22 | End: 2018-06-22

## 2018-06-22 RX ORDER — AMINOPHYLLINE 25 MG/ML
100 INJECTION, SOLUTION INTRAVENOUS ONCE
Status: DISCONTINUED | OUTPATIENT
Start: 2018-06-22 | End: 2018-06-23 | Stop reason: HOSPADM

## 2018-06-22 RX ADMIN — GADOBUTROL 7.5 ML: 604.72 INJECTION INTRAVENOUS at 09:07

## 2018-06-22 RX ADMIN — REGADENOSON 0.4 MG: 0.08 INJECTION, SOLUTION INTRAVENOUS at 08:35

## 2018-06-22 NOTE — PROGRESS NOTES
Pt here for cardiac MRI with stress. Safety checklist, allergies, and meds all reviewed. Test explained and all questions answered. Lungs clear. Denied caffeine intake. Lexiscan 0.4mg given over 15 seconds followed by 5cc NS flush. Pt tolerated scan, meds, and contrast well; stable throughout. Pre and post EKG completed. Pt monitored post MRI and escorted back to [facility] waiting room.

## 2018-06-25 LAB — INTERPRETATION ECG - MUSE: NORMAL

## 2018-07-06 ENCOUNTER — OFFICE VISIT (OUTPATIENT)
Dept: CARDIOLOGY | Facility: CLINIC | Age: 38
End: 2018-07-06
Attending: INTERNAL MEDICINE
Payer: COMMERCIAL

## 2018-07-06 VITALS
WEIGHT: 138.5 LBS | OXYGEN SATURATION: 99 % | HEIGHT: 64 IN | HEART RATE: 69 BPM | DIASTOLIC BLOOD PRESSURE: 78 MMHG | BODY MASS INDEX: 23.64 KG/M2 | SYSTOLIC BLOOD PRESSURE: 122 MMHG

## 2018-07-06 DIAGNOSIS — I42.8 ARRHYTHMOGENIC LEFT VENTRICULAR DYSPLASIA (H): ICD-10-CM

## 2018-07-06 PROCEDURE — G0463 HOSPITAL OUTPT CLINIC VISIT: HCPCS | Mod: ZF

## 2018-07-06 PROCEDURE — 99214 OFFICE O/P EST MOD 30 MIN: CPT | Mod: 25 | Performed by: INTERNAL MEDICINE

## 2018-07-06 ASSESSMENT — PAIN SCALES - GENERAL: PAINLEVEL: NO PAIN (0)

## 2018-07-06 NOTE — MR AVS SNAPSHOT
"              After Visit Summary   7/6/2018    Maren Bee    MRN: [id number]           Patient Information     Date Of Birth          1980        Visit Information        Provider Department      7/6/2018 4:00 PM Haylie Medrano MD [facility]        Today's Diagnoses     Arrhythmogenic left ventricular dysplasia (H)          Care Instructions    You were seen today in the Adult Congenital and Cardiovascular Genetics Clinic at the [facility].    Cardiology Providers you saw during your visit:  Dr. Haylie Medrano     Diagnosis:  ARVC    Results:  The results of your MRI and Ziopatch monitor were discussed with you today.     Recommendations:    1.  Continue to eat a heart healthy diet.  2.  Continue to get 20-30 minutes of aerobic activity, 4-5 days per week. Please follow recommended exercise guidelines as discussed in your appointment.    3.  Continue to observe good oral hygiene, with regular dental visits.  4.  Please have a cardiopulmonary stress test.   5. Please call us to schedule Genetic Testing with Monisha Cramer.  6. After you have completed your stress test please start Metoprolol XL 25 mg daily for 4 weeks.  Once you have been on this for 4 weeks please increase to 50 mg if you are tolerating the 25 mg.  Please call us if you do not tolerate the medication.    Exercise Stress Test:    A.  Do not eat or drink 3 hours prior to the test  B.  No caffeine, alcohol or smoking 12 hours prior to the test  C. Wear comfortable clothes.  D. Take your usual morning medications with a sip of water.     Vitals:    07/06/18 1610   BP: 122/78   BP Location: Left arm   Patient Position: Chair   Cuff Size: Adult Regular   Pulse: 69   SpO2: 99%   Weight: 62.8 kg (138 lb 8 oz)   Height: 1.626 m (5' 4\")       Exercise restrictions:   Yes__X__  No____         If yes, list restrictions:  Please follow Recommendations for the Acceptability of Recreational Sports Activities and Exercise in " Patients Handout      Work restrictions:  Yes____  No_X__         If yes, list restrictions:    FASTING CHOLESTEROL was checked in the last 5 years YES_X__  NO___   Continue to eat a heart healthy, low salt diet.         ____ Fasting lipid panel order today         ____ No changes in medications          ____ I recommend the following changes in your cholesterol medications.:          ____ Please follow up for cholesterol screening at your primary care physician      Follow-up:  Follow up with Dr. Medrano 1 year with EKG and Ziopatch monitor.     If you have questions or concerns please contact us at:    Allison Jones, RN, BSN   Suman Velarde (Scheduling)  Nurse Coordinator     Clinic   Adult Congenital and CV Genetics Adult Congenital and CV Genetic  [facility]  (P)[phone]    (P) [phone]  [email or web address] (F)[phone]        For after hours urgent needs, call [phone] and ask to speak to the Adult Congenital Physician on call.  Mention Job Code 0401.    For emergencies call 911.    [facility]   Clinics and Surgery Center  Mail Code [id number]  [address] [place] [address]  [address]           Follow-ups after your visit        Your next 10 appointments already scheduled     Jul 18, 2018  9:30 AM CDT   MA SCREENING DIGITAL BILATERAL with MGMA1, MG MA TECH   [facility] ([facility])    [id number] [place] [id number] [phone]           Do not use any powder, lotion or deodorant under your arms or on your breast. If you do, we will ask you to remove it before your exam.  Wear comfortable, two-piece clothing.  If you have any allergies, tell your care team.  Bring any previous mammograms from other facilities or have them mailed to the breast center. Three-dimensional (3D) mammograms are  "available at [place] locations in [place], [place], [place], [place], [facility], [place], [place], and [place]. [facility] locations include [place] and [facility] in [place]. Benefits of 3D mammograms include: - Improved rate of cancer detection - Decreases your chance of having to go back for more tests, which means fewer: - \"False-positive\" results (This means that there is an abnormal area but it isn't cancer.) - Invasive testing procedures, such as a biopsy or surgery - Can provide clearer images of the breast if you have dense breast tissue. 3D mammography is an optional exam that anyone can have with a 2D mammogram. It doesn't replace or take the place of a 2D mammogram. 2D mammograms remain an effective screening test for all women.  Not all insurance companies cover the cost of a 3D mammogram. Check with your insurance.              Future tests that were ordered for you today     Open Future Orders        Priority Expected Expires Ordered    Card Cardiopulmonary stress test - adult Routine 7/20/2018 8/20/2018 7/6/2018            Who to contact     If you have questions or need follow up information about today's clinic visit or your schedule please contact [facility] directly at [phone].  Normal or non-critical lab and imaging results will be communicated to you by O[employer], letter or phone within 4 business days after the clinic has received the results. If you do not hear from us within 7 days, please contact the clinic through [employer] or phone. If you have a critical or abnormal lab result, we will notify you by phone as soon as possible.  Submit refill requests through [employer] or call your pharmacy and they will forward the refill request to us. Please allow 3 business days for your refill to be completed.          Additional Information About Your Visit        [employer] Information     [employer] gives you secure access to your electronic health record. If you see " "a primary care provider, you can also send messages to your care team and make appointments. If you have questions, please call your primary care clinic.  If you do not have a primary care provider, please call [phone] and they will assist you.        Care EveryWhere ID     This is your Care EveryWhere ID. This could be used by other organizations to access your [place] medical records  [id number]        Your Vitals Were     Pulse Height Pulse Oximetry BMI (Body Mass Index)          69 1.626 m (5' 4\") 99% 23.77 kg/m2         Blood Pressure from Last 3 Encounters:   07/06/18 122/78   06/22/18 102/61   04/02/18 98/63    Weight from Last 3 Encounters:   07/06/18 62.8 kg (138 lb 8 oz)   04/02/18 65.2 kg (143 lb 12 oz)   04/26/16 63 kg (139 lb)              We Performed the Following     Follow-Up with Congenital Heart Clinic        Primary Care Provider Office Phone # Fax #    Betty Ashraf PA-C [phone]       [address] [place] [address]        Equal Access to Services     [facility]: Hadii aad ku hadasho Sonila, waaxda luqadaha, qaybta kaalmada adepastora, edwin miranda . So [facility] [phone].    ATENCIÓN: Si habla español, tiene a arias disposición servicios gratuitos de asistencia lingüística. [facility] [phone].    We comply with applicable federal civil rights laws and [place] laws. We do not discriminate on the basis of race, color, national origin, age, disability, sex, sexual orientation, or gender identity.            Thank you!     Thank you for choosing [facility]  for your care. Our goal is always to provide you with excellent care. Hearing back from our patients is one way we can continue to improve our services. Please take a few minutes to complete the written survey that you may receive in the mail after your visit with us. Thank you!             Your Updated Medication List - Protect others around you: Learn how to " safely use, store and throw away your medicines at www.disposemymeds.org.          This list is accurate as of 7/6/18  5:22 PM.  Always use your most recent med list.                   Brand Name Dispense Instructions for use Diagnosis    atorvastatin 20 MG tablet    LIPITOR    90 tablet    Take 1 tablet (20 mg) by mouth daily    Arrhythmogenic left ventricular dysplasia (H), Palpitations, Hyperlipidemia LDL goal <70       cholecalciferol 1000 units capsule    vitamin  -D    30 capsule    Take 1 capsule (1,000 Units) by mouth daily        levothyroxine 200 MCG tablet    SYNTHROID/LEVOTHROID    90 tablet    Take one daily; skip dose on Sunday        triamcinolone 0.1 % cream    KENALOG    80 g    Apply topically 2 times daily    Other atopic dermatitis

## 2018-07-06 NOTE — PATIENT INSTRUCTIONS
"You were seen today in the Adult Congenital and Cardiovascular Genetics Clinic at the [facility].    Cardiology Providers you saw during your visit:  Dr. Haylie Medrano     Diagnosis:  ARVC    Results:  The results of your MRI and Ziopatch monitor were discussed with you today.     Recommendations:    1.  Continue to eat a heart healthy diet.  2.  Continue to get 20-30 minutes of aerobic activity, 4-5 days per week. Please follow recommended exercise guidelines as discussed in your appointment.    3.  Continue to observe good oral hygiene, with regular dental visits.  4.  Please have a cardiopulmonary stress test.   5. Please call us to schedule Genetic Testing with Monisha Cramer.  6. After you have completed your stress test please start Metoprolol XL 25 mg daily for 4 weeks.  Once you have been on this for 4 weeks please increase to 50 mg if you are tolerating the 25 mg.  Please call us if you do not tolerate the medication.    Exercise Stress Test:    A.  Do not eat or drink 3 hours prior to the test  B.  No caffeine, alcohol or smoking 12 hours prior to the test  C. Wear comfortable clothes.  D. Take your usual morning medications with a sip of water.     Vitals:    07/06/18 1610   BP: 122/78   BP Location: Left arm   Patient Position: Chair   Cuff Size: Adult Regular   Pulse: 69   SpO2: 99%   Weight: 62.8 kg (138 lb 8 oz)   Height: 1.626 m (5' 4\")       Exercise restrictions:   Yes__X__  No____         If yes, list restrictions:  Please follow Recommendations for the Acceptability of Recreational Sports Activities and Exercise in Patients Handout      Work restrictions:  Yes____  No_X__         If yes, list restrictions:    FASTING CHOLESTEROL was checked in the last 5 years YES_X__  NO___   Continue to eat a heart healthy, low salt diet.         ____ Fasting lipid panel order today         ____ No changes in medications          ____ I recommend the following changes in your cholesterol " medications.:          ____ Please follow up for cholesterol screening at your primary care physician      Follow-up:  Follow up with Dr. Medrano 1 year with EKG and Ziopatch monitor.     If you have questions or concerns please contact us at:    Allison Jones RN, BSN   Suman Velarde (Scheduling)  Nurse Coordinator     Clinic   Adult Congenital and CV Genetics Adult Congenital and CV Genetic  [facility] Heart [facility] Care  [phone])[phone]    (P) [phone]  [email or web address] (F)[phone]        For after hours urgent needs, call [phone] and ask to speak to the Adult Congenital Physician on call.  Mention Job Code 0401.    For emergencies call 911.    [facility] Heart [facility] Health   Clinics and Surgery Center  Mail Code [id number]  [address] [place]  [address]

## 2018-07-06 NOTE — LETTER
7/6/2018      RE: Maren Bee  [address] [place] [address]       Dear Colleague,    Thank you for the opportunity to participate in the care of your patient, Maren Bee, at the [facility]. Please see a copy of my visit note below.    HPI:  38 yo female with arrhythmogenic cardiomyopathy with (frameshift [id number]) presents for ongoing evaluation and management.   Overall patient reports that she has been feeling well.  She denies any chest pain or pressure, sob/iniguez, orthopnea, pnd, palpitations, syncope/presyncope or ozzy.     CARDIAC HISTORY: Pt's brother suffered sudden cardiac death in 2012 while playing basketball - he was successfully resuscitated with AED. 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  After patient was found to be mutation positive she underwent cardiac MRI in 2012 which did not reveal any significant findings for right sided involvement of ARVC.  Holter was also unremarkable.  Later patient's mother had MRI and also found to have left-sided (septal) involvement of ARVC.  Given this in addition to carefully examining cardiac MRI from 2015 for left sided involvement of ARVC, past MRI was also reexamined and patient was noted to also have left-sided involvement of ARVC.   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      Hypothyroidism    Hyperlipidemia         PAST SURGICAL HISTORY:  Past Surgical History:   Procedure Laterality Date     DENTAL SURGERY       INSERT INTRAUTERINE DEVICE  2011    mirena     Strabismus Surgery         ALLERGIES   No Known Allergies    FAMILY HISTORY:  Family History   Problem Relation Age of Onset     Cancer Maternal Grandfather      Bladder     Other - See Comments Mother 58      "hyperlipidemia, hypothyroidism, ARVD gene +     Other - See Comments Father      hyperlipidemia, hypothyroidism     Lupus Maternal Grandmother      Other - See Comments Brother 32     (Amari) ARVD s/p sudden cardiac arrest     Cardiovascular Brother 30     (Young) MI at age 30; type 1 DM; ARVD gene negative;      Other - See Comments Other      Alcohol Abuse     Diabetes Daughter 3     type I       EXAM:  /78 (BP Location: Left arm, Patient Position: Chair, Cuff Size: Adult Regular)  Pulse 69  Ht 1.626 m (5' 4\")  Wt 62.8 kg (138 lb 8 oz)  SpO2 99%  BMI 23.77 kg/m2  In general, the patient is a pleasant female in no apparent distress.    HEENT: NC/AT.    Sclerae white, not injected.  Nares clear.  Pharynx without erythema or exudate.  Dentition intact.    Neck:  No jugular venous distension.   Heart: RRR. Normal S1, S2 splits physiologically. No murmur, rub, click, or gallop. The PMI is in the 5th ICS in the midclavicular line. There is no heave.    Lungs: CTA.  No ronchi, wheezes, rales.  No dullness to percussion.   Abdomen: Soft, nontender, nondistended.   Extremities: No clubbing, cyanosis, or edema.      Labs:  LIPID RESULTS:  Lab Results   Component Value Date    CHOL 242.0 (H) 06/12/2018    HDL 68.0 06/12/2018    .0 (H) 06/12/2018    LDL 74 07/30/2013    TRIG 56.0 06/12/2018    CHOLHDLRATIO 3.6 06/12/2018     CBC RESULTS:  Lab Results   Component Value Date    WBC 4.9 06/11/2013    RBC 4.56 06/11/2013    HGB 13.3 06/11/2013    HCT 41.4 06/11/2013    MCV 91 06/11/2013    MCH 29.2 06/11/2013    MCHC 32.1 06/11/2013    RDW 13.1 06/11/2013     06/11/2013       BMP RESULTS:  Lab Results   Component Value Date    GLC 96.0 06/12/2018        Procedures:    Holter June 2013:  No significant arrythmias    Cardiac MRI 6/2013: Impressions:  1. Normal left-ventricular size and global function (LVEF 57%).   There were no regional wall- motion abnormalities.   2. Normal right-ventricular size with " [normal right-ventricular   systolic function (RVEF 55%). There are no major or minor   morphological criteria for ARVC (Per the revised criteria (2010).   3. Normal resting perfusion study. There was no late gadolinium   enhancement to suggest prior infarct, inflammation, or infiltration.   4. Sub optimal quality of study due to susceptibility and RF artifacts   5. Compared to cardiac MRI dated 04/2012 there appears no change in   RV/LV function.    CARDIAC MRI: 7/1/14   IMPRESSION:   1. Normal left ventricular size and systolic function with a calculated ejection fraction of 55 %.   2. Normal right ventricular size and systolic function with a calculated ejection fraction of 56%. There are no major or minor morphological criteria for ARVC (Per the revised criteria (2010).   3. On delayed enhancement imaging, there is no abnormal hyperenhancement to suggest myocardial scar/inflammation/infiltration In comparison with prior study dated 6/10/2013, no significant changes are noted.    Examination: MR CARDIAC W CONTRAST   Date: 12/4/2015 2:30 PM  Indication: 35-year-old female with family history of ARVC  Comparison: Cardiac MRIs of 07/01/2014, 06/10/2013 and 04/05/2012  IMPRESSION:  Arrhythmogenic cardiomyopathy with left ventricular involvement based  on tissue characterization by delayed enhancement imaging, and no  right ventricular involvement based on 2010 Task Force criteria.  Review of prior cardiac MRIs shows the same areas of hyperenhancement  dating back to 04/05/2012 with no obvious changes in the interim period.    EKG 12/15/15:  Normal sinus rhyhtm. Sinus arrhythmia.  Unchanged from prior.    Holter 12/2015      Zio-monitor 6/15/18      CMR Report 06-                              Clinical history: 37-year-old female with arrhythmogenic cardiomyopathy and new symptoms of palpitations.  Comparison CMRs: 04/05/2012, 06/10/2013, 07/01/2014 and 12/04/2015  1. The LV is normal in cavity size and wall  thickness. The global systolic function is mildly reduced. The  LVEF is 52%. There are no regional wall motion abnormalities.  2. The RV is normal in cavity size. The global systolic function is normal. The RVEF is 61%. There is no  regional RV akinesia, dyskinesia or dyssynchronous RV contraction. There are no findings to satisfy CMR  criteria for the diagnosis of ARVC as per the 2010 Task Force criteria.  3. Both atria are normal in size.  4. There is no significant valvular disease.   5. Late gadolinium enhancement imaging shows epicardial hyperenhancement of the right ventricular aspect of  the mid inferoseptal segment, mid-myocardial hyperenhancement of the apical septal segment and epicardial  hyperenhancement of the mid anterolateral segment. These match the areas of fatty replacement on other  imaging.  6. Regadenoson perfusion imaging shows no ischemia.  7. There is no pericardial effusion or thickening.  CONCLUSIONS: Arrhythmogenic cardiomyopathy with left ventricular involvement based on tissue  characterization by delayed enhancement imaging, and no right ventricular involvement based on 2010 Task  Force criteria. There have been no changes when compared to the prior studies dating back to 04/05/2012. No  ischemia.                 Assessment and Plan:  36 yo female with arrhythmogenic cardiomyopathy with (frameshift [id number]) presents for ongoing evaluation and management.  1.  arrhythmogenic cardiomyopathy with (frameshift [id number]):   Pt remains asymptomatic. EKG today unremarkable.  Recent Zio also repealed no significant arrhythmias.  Recent MRI also confirms no progression.  Have had extensive discussions with patient about pathophysiology of ARVC.   Will obtain cardiopulmonary stress rest.  Once this completed to try and reduce arrhythmia risk and disease progression will initiate beta-blockage therapy.  Will begin patient on Metoprolol XL 25 mg daily.  After 3-4 weeks will have patient  increase Metoprolol XL to 50 mg daily.  Reviewed with patient ACC/AHA 2004 guidelines on exercise safety with ARVC.  Also discussed with patient the need to avoid all stimulants including Sudafed and all ephedrine containing products as well as methamphetamines and cocaine.  Pt again reminded that all first degree relatives should be screened for ARVC with cardiac MRI, EKG and Holter.  If family members decide to pursue genetic testing, then clinical screening would be indicated in gene positive, not gene negative, individuals.   Have previosuly discussed with patient option of ICD implantation.  Patient's recent Zio confirms no significant arrhythmias.  Will f/u results of CPX.  Pt  reminded that any syncopal episode is considered a medical emergency requiring ER visit/evaluaion.     Follow-up: 1 year with EKG, HR assessment with walking stairs and Ziopatch monitor.  Will be happy to see sooner if change in clinical status or new questions/concerns arise.      Haylie Medrano MD  Section Head - Advanced Heart Failure, Transplantation and Mechanical Circulatory Support  Co-Director - Adult Congenital and Cardiovascular Genetics Center  Associate Professor of Medicine, [facility]

## 2018-07-06 NOTE — NURSING NOTE
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 The monitor was placed on the patient with instructions regarding care of the skin electrodes and monitor, as well as documentation in the patient diary. Patient demonstrated understanding of this information and agreed to call with further questions or concerns.  Med Reconcile: Reviewed and verified all current medications with the patient. The updated medication list was printed and given to the patient.  Return Appointment: Patient given instructions regarding scheduling next clinic visit. Patient demonstrated understanding of this information and agreed to call with further questions or concerns.  Patient stated she understood all health information given and agreed to call with further questions or concerns.     Allison Jones RN, BSN  Cardiology Care Coordinator  [facility]  [email or web address]  [phone]

## 2018-07-06 NOTE — NURSING NOTE
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     Christine Torres MA    4:11 PM

## 2018-07-13 DIAGNOSIS — E03.9 HYPOTHYROIDISM, UNSPECIFIED TYPE: ICD-10-CM

## 2018-07-13 LAB
T3 SERPL-MCNC: 107 NG/DL (ref 60–181)
T4 FREE SERPL-MCNC: 1.68 NG/DL (ref 0.76–1.46)
TSH SERPL DL<=0.005 MIU/L-ACNC: 0.18 MU/L (ref 0.4–4)

## 2018-07-18 ENCOUNTER — HOSPITAL ENCOUNTER (OUTPATIENT)
Dept: CARDIOLOGY | Facility: CLINIC | Age: 38
Discharge: HOME | End: 2018-07-18
Attending: INTERNAL MEDICINE | Admitting: INTERNAL MEDICINE
Payer: COMMERCIAL

## 2018-07-18 DIAGNOSIS — I42.8 ARRHYTHMOGENIC LEFT VENTRICULAR DYSPLASIA (H): ICD-10-CM

## 2018-07-18 PROCEDURE — 94621 CARDIOPULM EXERCISE TESTING: CPT | Mod: 26 | Performed by: INTERNAL MEDICINE

## 2018-07-18 PROCEDURE — 94621 CARDIOPULM EXERCISE TESTING: CPT | Performed by: INTERNAL MEDICINE

## 2018-08-06 ENCOUNTER — CARE COORDINATION (OUTPATIENT)
Dept: PEDIATRIC CARDIOLOGY | Facility: CLINIC | Age: 38
End: 2018-08-06

## 2018-08-06 DIAGNOSIS — I42.8 ARRHYTHMOGENIC RIGHT VENTRICULAR DYSPLAS (H): Primary | ICD-10-CM

## 2018-08-06 RX ORDER — METOPROLOL SUCCINATE 25 MG/1
25 TABLET, EXTENDED RELEASE ORAL DAILY
Qty: 90 TABLET | Refills: 0 | Status: SHIPPED | OUTPATIENT
Start: 2018-08-06 | End: 2019-03-05

## 2018-08-06 NOTE — PROGRESS NOTES
HPI:  38 yo female with arrhythmogenic cardiomyopathy with (frameshift [id number]) presents for ongoing evaluation and management.   Overall patient reports that she has been feeling well.  She denies any chest pain or pressure, sob/iniguez, orthopnea, pnd, palpitations, syncope/presyncope or ozzy.     CARDIAC HISTORY: Pt's brother suffered sudden cardiac death in 2012 while playing basketball - he was successfully resuscitated with AED. 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  After patient was found to be mutation positive she underwent cardiac MRI in 2012 which did not reveal any significant findings for right sided involvement of ARVC.  Holter was also unremarkable.  Later patient's mother had MRI and also found to have left-sided (septal) involvement of ARVC.  Given this in addition to carefully examining cardiac MRI from 2015 for left sided involvement of ARVC, past MRI was also reexamined and patient was noted to also have left-sided involvement of ARVC.   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      Hypothyroidism    Hyperlipidemia         PAST SURGICAL HISTORY:  Past Surgical History:   Procedure Laterality Date     DENTAL SURGERY       INSERT INTRAUTERINE DEVICE  2011    mirena     Strabismus Surgery         ALLERGIES   No Known Allergies    FAMILY HISTORY:  Family History   Problem Relation Age of Onset     Cancer Maternal Grandfather      Bladder     Other - See Comments Mother 58     hyperlipidemia, hypothyroidism, ARVD gene +     Other - See Comments Father      hyperlipidemia, hypothyroidism     Lupus Maternal Grandmother      Other - See Comments Brother 32     (Amari) ARVD s/p sudden cardiac arrest     Cardiovascular Brother 30     (Young) MI at age 30; type 1 DM; ARVD gene negative;      Other - See  "Comments Other      Alcohol Abuse     Diabetes Daughter 3     type I       EXAM:  /78 (BP Location: Left arm, Patient Position: Chair, Cuff Size: Adult Regular)  Pulse 69  Ht 1.626 m (5' 4\")  Wt 62.8 kg (138 lb 8 oz)  SpO2 99%  BMI 23.77 kg/m2  In general, the patient is a pleasant female in no apparent distress.    HEENT: NC/AT.    Sclerae white, not injected.  Nares clear.  Pharynx without erythema or exudate.  Dentition intact.    Neck:  No jugular venous distension.   Heart: RRR. Normal S1, S2 splits physiologically. No murmur, rub, click, or gallop. The PMI is in the 5th ICS in the midclavicular line. There is no heave.    Lungs: CTA.  No ronchi, wheezes, rales.  No dullness to percussion.   Abdomen: Soft, nontender, nondistended.   Extremities: No clubbing, cyanosis, or edema.      Labs:  LIPID RESULTS:  Lab Results   Component Value Date    CHOL 242.0 (H) 06/12/2018    HDL 68.0 06/12/2018    .0 (H) 06/12/2018    LDL 74 07/30/2013    TRIG 56.0 06/12/2018    CHOLHDLRATIO 3.6 06/12/2018     CBC RESULTS:  Lab Results   Component Value Date    WBC 4.9 06/11/2013    RBC 4.56 06/11/2013    HGB 13.3 06/11/2013    HCT 41.4 06/11/2013    MCV 91 06/11/2013    MCH 29.2 06/11/2013    MCHC 32.1 06/11/2013    RDW 13.1 06/11/2013     06/11/2013       BMP RESULTS:  Lab Results   Component Value Date    GLC 96.0 06/12/2018        Procedures:    Holter June 2013:  No significant arrythmias    Cardiac MRI 6/2013: Impressions:  1. Normal left-ventricular size and global function (LVEF 57%).   There were no regional wall- motion abnormalities.   2. Normal right-ventricular size with [normal right-ventricular   systolic function (RVEF 55%). There are no major or minor   morphological criteria for ARVC (Per the revised criteria (2010).   3. Normal resting perfusion study. There was no late gadolinium   enhancement to suggest prior infarct, inflammation, or infiltration.   4. Sub optimal quality of study due " to susceptibility and RF artifacts   5. Compared to cardiac MRI dated 04/2012 there appears no change in   RV/LV function.    CARDIAC MRI: 7/1/14   IMPRESSION:   1. Normal left ventricular size and systolic function with a calculated ejection fraction of 55 %.   2. Normal right ventricular size and systolic function with a calculated ejection fraction of 56%. There are no major or minor morphological criteria for ARVC (Per the revised criteria (2010).   3. On delayed enhancement imaging, there is no abnormal hyperenhancement to suggest myocardial scar/inflammation/infiltration In comparison with prior study dated 6/10/2013, no significant changes are noted.    Examination: MR CARDIAC W CONTRAST   Date: 12/4/2015 2:30 PM  Indication: 35-year-old female with family history of ARVC  Comparison: Cardiac MRIs of 07/01/2014, 06/10/2013 and 04/05/2012  IMPRESSION:  Arrhythmogenic cardiomyopathy with left ventricular involvement based  on tissue characterization by delayed enhancement imaging, and no  right ventricular involvement based on 2010 Task Force criteria.  Review of prior cardiac MRIs shows the same areas of hyperenhancement  dating back to 04/05/2012 with no obvious changes in the interim period.    EKG 12/15/15:  Normal sinus rhyhtm. Sinus arrhythmia.  Unchanged from prior.    Holter 12/2015      Zio-monitor 6/15/18      CMR Report 06-                              Clinical history: 37-year-old female with arrhythmogenic cardiomyopathy and new symptoms of palpitations.  Comparison CMRs: 04/05/2012, 06/10/2013, 07/01/2014 and 12/04/2015  1. The LV is normal in cavity size and wall thickness. The global systolic function is mildly reduced. The  LVEF is 52%. There are no regional wall motion abnormalities.  2. The RV is normal in cavity size. The global systolic function is normal. The RVEF is 61%. There is no  regional RV akinesia, dyskinesia or dyssynchronous RV contraction. There are no findings to satisfy  CMR  criteria for the diagnosis of ARVC as per the 2010 Task Force criteria.  3. Both atria are normal in size.  4. There is no significant valvular disease.   5. Late gadolinium enhancement imaging shows epicardial hyperenhancement of the right ventricular aspect of  the mid inferoseptal segment, mid-myocardial hyperenhancement of the apical septal segment and epicardial  hyperenhancement of the mid anterolateral segment. These match the areas of fatty replacement on other  imaging.  6. Regadenoson perfusion imaging shows no ischemia.  7. There is no pericardial effusion or thickening.  CONCLUSIONS: Arrhythmogenic cardiomyopathy with left ventricular involvement based on tissue  characterization by delayed enhancement imaging, and no right ventricular involvement based on 2010 Task  Force criteria. There have been no changes when compared to the prior studies dating back to 04/05/2012. No  ischemia.                 Assessment and Plan:  36 yo female with arrhythmogenic cardiomyopathy with (frameshift [id number]) presents for ongoing evaluation and management.  1.  arrhythmogenic cardiomyopathy with (frameshift [id number]):   Pt remains asymptomatic. EKG today unremarkable.  Recent Zio also repealed no significant arrhythmias.  Recent MRI also confirms no progression.  Have had extensive discussions with patient about pathophysiology of ARVC.   Will obtain cardiopulmonary stress rest.  Once this completed to try and reduce arrhythmia risk and disease progression will initiate beta-blockage therapy.  Will begin patient on Metoprolol XL 25 mg daily.  After 3-4 weeks will have patient increase Metoprolol XL to 50 mg daily.  Reviewed with patient ACC/AHA 2004 guidelines on exercise safety with ARVC.  Also discussed with patient the need to avoid all stimulants including Sudafed and all ephedrine containing products as well as methamphetamines and cocaine.  Pt again reminded that all first degree relatives should be  screened for ARVC with cardiac MRI, EKG and Holter.  If family members decide to pursue genetic testing, then clinical screening would be indicated in gene positive, not gene negative, individuals.  Have previosuly discussed with patient option of ICD implantation.  Patient's recent Zio confirms no significant arrhythmias.  Will f/u results of CPX.  Pt reminded that any syncopal episode is considered a medical emergency requiring ER visit/evaluaion.     Follow-up: 1 year with EKG, HR assessment with walking stairs and Ziopatch monitor.  Will be happy to see sooner if change in clinical status or new questions/concerns arise.      Haylie Medrano MD  Section Head - Advanced Heart Failure, Transplantation and Mechanical Circulatory Support  Co-Director - Adult Congenital and Cardiovascular Genetics Center  Associate Professor of Medicine, [facility]

## 2018-08-06 NOTE — PROGRESS NOTES
Patient was to start taking Metoprolol XL 25 mg daily after her CPX.  Patient did not have prescription.  Sent prescription to preferred pharmacy and updated her with titration orders.  Patient will update clinic on how she is tolerating the medication.    Allison Jones RN, BSN  Cardiology Care Coordinator  [facility]  [email or web address]  [phone]

## 2018-09-07 ENCOUNTER — RADIANT APPOINTMENT (OUTPATIENT)
Dept: MAMMOGRAPHY | Facility: CLINIC | Age: 38
End: 2018-09-07
Attending: PHYSICIAN ASSISTANT
Payer: COMMERCIAL

## 2018-09-07 DIAGNOSIS — Z12.39 BREAST SCREENING: ICD-10-CM

## 2018-09-07 PROCEDURE — 77067 SCR MAMMO BI INCL CAD: CPT

## 2018-09-14 ENCOUNTER — RADIANT APPOINTMENT (OUTPATIENT)
Dept: MAMMOGRAPHY | Facility: CLINIC | Age: 38
End: 2018-09-14
Attending: PHYSICIAN ASSISTANT
Payer: COMMERCIAL

## 2018-09-14 ENCOUNTER — RADIANT APPOINTMENT (OUTPATIENT)
Dept: ULTRASOUND IMAGING | Facility: CLINIC | Age: 38
End: 2018-09-14
Attending: PHYSICIAN ASSISTANT
Payer: COMMERCIAL

## 2018-09-14 DIAGNOSIS — R92.8 ABNORMAL MAMMOGRAM: ICD-10-CM

## 2018-09-14 PROCEDURE — 77065 DX MAMMO INCL CAD UNI: CPT | Mod: RT | Performed by: RADIOLOGY

## 2018-09-14 PROCEDURE — 76642 ULTRASOUND BREAST LIMITED: CPT | Mod: RT | Performed by: RADIOLOGY

## 2018-09-21 DIAGNOSIS — E03.8 OTHER SPECIFIED HYPOTHYROIDISM: Primary | ICD-10-CM

## 2018-09-21 RX ORDER — LEVOTHYROXINE SODIUM 200 UG/1
TABLET ORAL
Qty: 90 TABLET | Refills: 0 | Status: SHIPPED | OUTPATIENT
Start: 2018-09-21 | End: 2018-09-27

## 2018-09-21 NOTE — TELEPHONE ENCOUNTER
Last office visit:  04/02/2018, no future appointment.  Needs lab and then office visit:    Medication is being filled for 1 time refill only due to:  Patient needs labs TSH - order already in chart.   Nataliya Nguyen RN  Care Coordinator  [facility]

## 2018-09-27 DIAGNOSIS — E03.8 OTHER SPECIFIED HYPOTHYROIDISM: ICD-10-CM

## 2018-09-27 RX ORDER — LEVOTHYROXINE SODIUM 200 UG/1
TABLET ORAL
Qty: 90 TABLET | Refills: 0 | Status: SHIPPED | OUTPATIENT
Start: 2018-09-27 | End: 2019-01-24

## 2018-09-27 NOTE — TELEPHONE ENCOUNTER
Last office visit:  04/02/2018,no  future appointment. Wants this prescription at [facility].    Re-directing to Jerrell Nguyen RN  Care Coordinator  [facility]

## 2018-10-03 DIAGNOSIS — Z84.81 FAMILY HISTORY OF GENE MUTATION: Primary | ICD-10-CM

## 2018-10-03 DIAGNOSIS — Z82.49 FAMILY HISTORY OF CARDIOMYOPATHY: ICD-10-CM

## 2018-10-03 LAB — MISCELLANEOUS TEST: NORMAL

## 2018-10-04 DIAGNOSIS — I42.8 ARRHYTHMOGENIC RIGHT VENTRICULAR DYSPLASIA ASSOCIATED WITH MUTATION IN DSP GENE (H): Primary | ICD-10-CM

## 2019-01-23 ENCOUNTER — TELEPHONE (OUTPATIENT)
Dept: FAMILY MEDICINE | Facility: CLINIC | Age: 39
End: 2019-01-23

## 2019-01-23 DIAGNOSIS — E03.9 HYPOTHYROIDISM, UNSPECIFIED TYPE: ICD-10-CM

## 2019-01-23 LAB
T4 FREE SERPL-MCNC: 0.67 NG/DL (ref 0.76–1.46)
TSH SERPL DL<=0.005 MIU/L-ACNC: 40.9 MU/L (ref 0.4–4)

## 2019-01-23 NOTE — TELEPHONE ENCOUNTER
----- Message from Andres Rice sent at 1/23/2019 10:23 AM CST -----  Regarding: orders and refill  Contact: [phone]  Msg for Pasc[employer]:    levothyroxine (SYNTHROID/LEVOTHROID) 200 MCG tablet need refill  [facility] [place] [facility] [address].    Also requesting call back to discuss thyroid labs that are not in chart. Please call if these labs are still needed.    VOICEMAIL OK    Thanks,      Andres [facility]

## 2019-01-23 NOTE — TELEPHONE ENCOUNTER
Placed a call to patient, LVM to call clinic or send [employer] message regarding questions she has about TSH labs due. Chart review indicates patient due for TSH lab work for future refills, due for office visit.   Andra Bergeron RN  01/23/19  10:48 AM

## 2019-01-24 DIAGNOSIS — E03.9 ACQUIRED HYPOTHYROIDISM: Primary | ICD-10-CM

## 2019-01-24 DIAGNOSIS — E03.8 OTHER SPECIFIED HYPOTHYROIDISM: ICD-10-CM

## 2019-01-24 RX ORDER — LEVOTHYROXINE SODIUM 200 UG/1
200 TABLET ORAL DAILY
Qty: 30 TABLET | Refills: 1 | Status: SHIPPED | OUTPATIENT
Start: 2019-01-24 | End: 2019-03-07

## 2019-01-24 NOTE — PROGRESS NOTES
Patient notified via [employer] for need for labs and the new dosing. Rx placed to preferred pharmacy.   Andra Bergeron RN  01/24/19  3:50 PM

## 2019-03-04 DIAGNOSIS — I42.8 ARRHYTHMOGENIC RIGHT VENTRICULAR DYSPLAS (H): ICD-10-CM

## 2019-03-05 RX ORDER — METOPROLOL SUCCINATE 25 MG/1
25 TABLET, EXTENDED RELEASE ORAL DAILY
Qty: 90 TABLET | Refills: 0 | Status: SHIPPED | OUTPATIENT
Start: 2019-03-05 | End: 2019-05-07

## 2019-03-06 DIAGNOSIS — E03.9 ACQUIRED HYPOTHYROIDISM: ICD-10-CM

## 2019-03-06 LAB
T4 FREE SERPL-MCNC: 1.35 NG/DL (ref 0.76–1.46)
TSH SERPL DL<=0.005 MIU/L-ACNC: 4.95 MU/L (ref 0.4–4)

## 2019-03-07 DIAGNOSIS — E03.8 OTHER SPECIFIED HYPOTHYROIDISM: ICD-10-CM

## 2019-03-07 RX ORDER — LEVOTHYROXINE SODIUM 200 UG/1
200 TABLET ORAL DAILY
Qty: 90 TABLET | Refills: 0 | Status: SHIPPED | OUTPATIENT
Start: 2019-03-07 | End: 2019-05-07

## 2019-05-06 NOTE — PROGRESS NOTES
SUBJECTIVE:   CC: Maren Bee is an 38 year old woman who presents for preventive health visit. Her last CPE was 2018. She duane the following issues to discuss:    1. Hypothyroidism Follow-up    Since last visit, patient describes the following symptoms: Weight stable, no hair loss, no skin changes, no constipation, no loose stools    TSH was slightly elevated 2 months ago.  Will recheck and see where we are at and make sure back in range. Has been stable on current dose.    2. Arrhythmogenic left ventricular dysplasia:  Followed by Dr. Medrano [facility]. Started on metoprolol.  She is tolerating well and as directed has been taking 50 mg once daily. She has noted less palpitations since starting the medication.  Tolerating well.  Due for cardiology follow up --scheduled    3. Hyperlipidemia Follow-Up:  Currently on statin.  Denies muscle weakness, fatigue. Tolerating well.    Rate your low fat/cholesterol diet?: good    Taking statin?  Yes, no muscle aches from statin    Other lipid medications/supplements?:  none      GYN history:  Periods: regular, lasting 5 days.  Flow described as moderate  no dysmenorrhea, no Dysparuneia  Currently sexually active: yes    Contraception:  had vasectomy  Patient's last menstrual period was 04/15/2019.  Vaginal symptoms: No  Concern for STD: no    Accepts/requests STD testing: declined      Healthy Habits:    Do you get at least three servings of calcium containing foods daily (dairy, green leafy vegetables, etc.)? yes    Amount of exercise or daily activities, outside of work: 3 day(s) per week--some limitations due to cardiac issue    Problems taking medications regularly No    Medication side effects: No    Have you had an eye exam in the past two years? yes    Do you see a dentist twice per year? yes    Do you have sleep apnea, excessive snoring or daytime drowsiness?no    Today's PHQ-2 Score:   PHQ-2 (  [employer]) 2019   Q1:  Little interest or pleasure in doing things 0 0   Q2: Feeling down, depressed or hopeless 0 0   PHQ-2 Score 0 0       Patient Active Problem List    Diagnosis Date Noted     Hyperlipidemia LDL goal <130 2019     Priority: Medium     Hypothyroidism 03/15/2016     Priority: Medium     Arrhythmogenic left ventricular dysplasia (H) 2016     Priority: Medium     Seen on MRI  Cardiologist started Beta blocker, consider ICD, she is referred to EP specialist       Contact dermatitis 2012     Priority: Medium     Keratosis pilaris 2012     Priority: Medium     Cardiomyopathy, familial (H) 2012     Priority: Medium     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         Past Medical History:   Diagnosis Date     Cardiomyopathy, familial (H) 2012    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      Hypothyroidism        Past Surgical History:   Procedure Laterality Date     DENTAL SURGERY       Strabismus Surgery         Family History   Problem Relation Age of Onset     Cancer Maternal Grandfather         Bladder     Other - See Comments Mother 58        hyperlipidemia, hypothyroidism, ARVD gene +     Other - See Comments Father         hyperlipidemia, hypothyroidism     Lupus Maternal Grandmother      Other - See Comments Brother 32        (Amari) ARVD s/p sudden cardiac arrest     Cardiovascular Brother 30        (Young) MI at age 30; type 1 DM; ARVD gene negative;      Diabetes Type 1 Brother      Other - See Comments Other         Alcohol Abuse     Diabetes Daughter 3        type I       Social History     Tobacco Use     Smoking status: Former Smoker     Years: 2.00     Types: Cigarettes     Last attempt to quit: 4/10/2001     Years since quittin.0     Smokeless tobacco: Never Used   Substance Use Topics     Alcohol use: Yes     Alcohol/week: 1.2 oz     Types: 2 Standard drinks or equivalent per week       Social History     Social History Narrative     Lives with  and 2 daughter.  Ages 9 and 12  The 12 year old has type 1 diabetes. One cat.        .Has a good support system.    Feels safe in all environments.    Wears seatbelt 100% of the time    Wears helmet while biking.    Denies history of abuse, past or present, physical, sexual or emotional.    04/02/18    Vale Ashraf PA-C        Updated 05/07/19           Current Outpatient Medications   Medication Sig Dispense Refill     atorvastatin (LIPITOR) 20 MG tablet Take 1 tablet (20 mg) by mouth daily 90 tablet 3     cholecalciferol (VITAMIN  -D) 1000 UNITS capsule Take 1 capsule (1,000 Units) by mouth daily 30 capsule      Cholecalciferol (VITAMIN D3) 1000 units CAPS Take 1,000 Units by mouth       levothyroxine (SYNTHROID/LEVOTHROID) 200 MCG tablet Take 1 tablet (200 mcg) by mouth daily 90 tablet 0     metoprolol succinate ER (TOPROL-XL) 25 MG 24 hr tablet Take 2 tablets (50 mg) by mouth daily 90 tablet 0     metroNIDAZOLE (METROGEL) 0.75 % external gel Apply topically once       triamcinolone (KENALOG) 0.1 % cream Apply topically 2 times daily 80 g 1       Reviewed orders with patient.  Reviewed health maintenance and updated orders accordingly - Yes    Mammogram Screening: Patient under age 50, mutual decision reflected in health maintenance. Will order mammogram due to very dense breasts.  We discussed conflicting recommendations.  I recommend repeat mammogram Q2 years.      Pertinent mammograms are reviewed under the imaging tab.  History of abnormal Pap smear: NO - age 30- 65 PAP every 3 years recommended  PAP / HPV Latest Ref Rng & Units 4/2/2018   PAP - ASC-US(A)   HPV 16 DNA NEG:Negative Negative   HPV 18 DNA NEG:Negative Negative   OTHER HR HPV NEG:Negative Negative     Reviewed and updated as needed this visit by clinical staff  Tobacco  Allergies  Meds  Problems  Med Hx  Surg Hx  Fam Hx  Soc Hx          Reviewed and updated as needed this visit by Provider  Tobacco  Allergies  Meds   "Problems  Med Hx  Surg Hx  Fam Hx  Soc Hx             ROS:  CONSTITUTIONAL: NEGATIVE for fever, chills, change in weight  INTEGUMENTARU/SKIN: NEGATIVE for worrisome rashes, moles or lesions  EYES: NEGATIVE for vision changes or irritation  ENT: NEGATIVE for ear, mouth and throat problems  RESP: NEGATIVE for significant cough or SOB  BREAST: NEGATIVE for masses, tenderness or discharge  CV: NEGATIVE for chest pain, palpitations or peripheral edema  GI: NEGATIVE for nausea, abdominal pain, heartburn, or change in bowel habits  : NEGATIVE for unusual urinary or vaginal symptoms. Periods are regular.  MUSCULOSKELETAL: NEGATIVE for significant arthralgias or myalgia  NEURO: NEGATIVE for weakness, dizziness or paresthesias  ENDOCRINE: NEGATIVE for temperature intolerance, skin/hair changes  HEME/ALLERGY/IMMUNE: NEGATIVE for bleeding problems  PSYCHIATRIC: NEGATIVE for changes in mood or affect    OBJECTIVE:   /67   Pulse 64   Temp 98.4  F (36.9  C) (Oral)   Ht 1.65 m (5' 4.96\")   Wt 65.8 kg (145 lb)   LMP 04/15/2019   SpO2 99%   BMI 24.16 kg/m    EXAM:  GENERAL: healthy, alert and no distress  EYES: Eyes grossly normal to inspection, PERRL and conjunctivae and sclerae normal  HENT: ear canals and TM's normal, nose and mouth without ulcers or lesions  NECK: no adenopathy, no asymmetry, masses, or scars and thyroid normal to palpation.  No bruits  RESP: lungs clear to auscultation - no rales, rhonchi or wheezes  BREAST: normal without masses, tenderness or nipple discharge and no palpable axillary masses or adenopathy  CV: regular rate and rhythm, normal S1 S2, no S3 or S4, no murmur, click or rub, no peripheral edema and peripheral pulses strong  ABDOMEN: soft, nontender, no hepatosplenomegaly, no masses and bowel sounds normal  MS: no gross musculoskeletal defects noted, no clubbing, edema or cyanosis of extremities.  Pulses = and appropriate bilaterally to DP and PT  SKIN: no suspicious lesions or " "rashes  NEURO: Normal strength and tone, mentation intact and speech normal  PSYCH: mentation appears normal, affect normal/bright  LYMPH: no cervical, supraclavicular, axillary, or inguinal adenopathy      ASSESSMENT/PLAN:       ICD-10-CM    1. Routine general medical examination at a health care facility Z00.00 Lipid Panel Plus ([place])   2. Hyperlipidemia LDL goal <130 E78.5 ALT     TSH with free T4 reflex     Lipid Panel Plus ([place])   3. Acquired hypothyroidism E03.9 TSH with free T4 reflex   4. Arrhythmogenic left ventricular dysplasia (H) I42.8 metoprolol succinate ER (TOPROL-XL) 25 MG 24 hr tablet   5. Encounter for screening mammogram for breast cancer Z12.31 MA Screening Digital Bilateral     If labs normal will send full PRESCRIPTION to pharmacy for levothyroxine and atorvastatin.      COUNSELING:   Reviewed preventive health counseling, as reflected in patient instructions  Special attention given to:        Regular exercise       Healthy diet/nutrition       Vision screening       Immunizations    Reviewed and up to date.       Osteoporosis Prevention/Bone Health       Colon cancer screening    BP Readings from Last 1 Encounters:   05/07/19 105/67     Estimated body mass index is 24.16 kg/m  as calculated from the following:    Height as of this encounter: 1.65 m (5' 4.96\").    Weight as of this encounter: 65.8 kg (145 lb).           reports that she quit smoking about 18 years ago. Her smoking use included cigarettes. She quit after 2.00 years of use. She has never used smokeless tobacco.      Counseling Resources:  ATP IV Guidelines  Pooled Cohorts Equation Calculator  Breast Cancer Risk Calculator  FRAX Risk Assessment  ICSI Preventive Guidelines  Dietary Guidelines for Americans, 2010  USDA's MyPlate  ASA Prophylaxis  Lung CA Screening    Betty Ashraf PA-C  [facility]  "

## 2019-05-07 ENCOUNTER — OFFICE VISIT (OUTPATIENT)
Dept: FAMILY MEDICINE | Facility: CLINIC | Age: 39
End: 2019-05-07
Payer: COMMERCIAL

## 2019-05-07 VITALS
WEIGHT: 145 LBS | HEIGHT: 65 IN | HEART RATE: 64 BPM | SYSTOLIC BLOOD PRESSURE: 105 MMHG | BODY MASS INDEX: 24.16 KG/M2 | TEMPERATURE: 98.4 F | OXYGEN SATURATION: 99 % | DIASTOLIC BLOOD PRESSURE: 67 MMHG

## 2019-05-07 DIAGNOSIS — R00.2 PALPITATIONS: ICD-10-CM

## 2019-05-07 DIAGNOSIS — E03.8 OTHER SPECIFIED HYPOTHYROIDISM: ICD-10-CM

## 2019-05-07 DIAGNOSIS — Z00.00 ROUTINE GENERAL MEDICAL EXAMINATION AT A HEALTH CARE FACILITY: Primary | ICD-10-CM

## 2019-05-07 DIAGNOSIS — E03.9 ACQUIRED HYPOTHYROIDISM: ICD-10-CM

## 2019-05-07 DIAGNOSIS — I42.8 ARRHYTHMOGENIC LEFT VENTRICULAR DYSPLASIA (H): ICD-10-CM

## 2019-05-07 DIAGNOSIS — E78.5 HYPERLIPIDEMIA LDL GOAL <70: ICD-10-CM

## 2019-05-07 DIAGNOSIS — E78.5 HYPERLIPIDEMIA LDL GOAL <130: ICD-10-CM

## 2019-05-07 DIAGNOSIS — Z12.31 ENCOUNTER FOR SCREENING MAMMOGRAM FOR BREAST CANCER: ICD-10-CM

## 2019-05-07 LAB
ALT SERPL-CCNC: 29 U/L (ref 0–50)
AST SERPL-CCNC: 21 U/L (ref 0–45)
CHOLEST SERPL-MCNC: 204 MG/DL (ref 0–200)
CHOLEST/HDLC SERPL: 2.5 {RATIO} (ref 0–5)
FASTING SPECIMEN: NO
GLUCOSE SERPL-MCNC: 91 MG/DL (ref 60–109)
HDLC SERPL-MCNC: 81 MG/DL
LDLC SERPL CALC-MCNC: 111 MG/DL (ref 0–129)
T4 FREE SERPL-MCNC: 0.91 NG/DL (ref 0.76–1.46)
TRIGL SERPL-MCNC: 62 MG/DL (ref 0–150)
TSH SERPL DL<=0.005 MIU/L-ACNC: 13.1 MU/L (ref 0.4–4)
VLDL-CHOLESTEROL: 12 (ref 7–32)

## 2019-05-07 RX ORDER — ATORVASTATIN CALCIUM 20 MG/1
20 TABLET, FILM COATED ORAL DAILY
Qty: 90 TABLET | Refills: 3 | Status: SHIPPED | OUTPATIENT
Start: 2019-05-07 | End: 2020-05-26

## 2019-05-07 RX ORDER — LEVOTHYROXINE SODIUM 200 UG/1
200 TABLET ORAL DAILY
Qty: 90 TABLET | Refills: 3 | Status: SHIPPED | OUTPATIENT
Start: 2019-05-07 | End: 2020-05-26

## 2019-05-07 RX ORDER — METOPROLOL SUCCINATE 25 MG/1
50 TABLET, EXTENDED RELEASE ORAL DAILY
Qty: 90 TABLET | Refills: 0 | COMMUNITY
Start: 2019-05-07 | End: 2019-05-30

## 2019-05-07 RX ORDER — METRONIDAZOLE 7.5 MG/G
GEL TOPICAL ONCE
COMMUNITY
End: 2020-05-26

## 2019-05-07 SDOH — ECONOMIC STABILITY: TRANSPORTATION INSECURITY
IN THE PAST 12 MONTHS, HAS LACK OF TRANSPORTATION KEPT YOU FROM MEETINGS, WORK, OR FROM GETTING THINGS NEEDED FOR DAILY LIVING?: NO

## 2019-05-07 SDOH — HEALTH STABILITY: PHYSICAL HEALTH: ON AVERAGE, HOW MANY DAYS PER WEEK DO YOU ENGAGE IN MODERATE TO STRENUOUS EXERCISE (LIKE A BRISK WALK)?: 3 DAYS

## 2019-05-07 SDOH — HEALTH STABILITY: MENTAL HEALTH
STRESS IS WHEN SOMEONE FEELS TENSE, NERVOUS, ANXIOUS, OR CAN'T SLEEP AT NIGHT BECAUSE THEIR MIND IS TROUBLED. HOW STRESSED ARE YOU?: ONLY A LITTLE

## 2019-05-07 SDOH — ECONOMIC STABILITY: FOOD INSECURITY: WITHIN THE PAST 12 MONTHS, THE FOOD YOU BOUGHT JUST DIDN'T LAST AND YOU DIDN'T HAVE MONEY TO GET MORE.: NEVER TRUE

## 2019-05-07 SDOH — ECONOMIC STABILITY: TRANSPORTATION INSECURITY
IN THE PAST 12 MONTHS, HAS THE LACK OF TRANSPORTATION KEPT YOU FROM MEDICAL APPOINTMENTS OR FROM GETTING MEDICATIONS?: NO

## 2019-05-07 SDOH — ECONOMIC STABILITY: INCOME INSECURITY: HOW HARD IS IT FOR YOU TO PAY FOR THE VERY BASICS LIKE FOOD, HOUSING, MEDICAL CARE, AND HEATING?: NOT HARD AT ALL

## 2019-05-07 SDOH — HEALTH STABILITY: PHYSICAL HEALTH: ON AVERAGE, HOW MANY MINUTES DO YOU ENGAGE IN EXERCISE AT THIS LEVEL?: 30 MIN

## 2019-05-07 SDOH — ECONOMIC STABILITY: FOOD INSECURITY: WITHIN THE PAST 12 MONTHS, YOU WORRIED THAT YOUR FOOD WOULD RUN OUT BEFORE YOU GOT MONEY TO BUY MORE.: NEVER TRUE

## 2019-05-07 ASSESSMENT — MIFFLIN-ST. JEOR: SCORE: 1327.98

## 2019-05-07 NOTE — NURSING NOTE
"38 year old  Chief Complaint   Patient presents with     Physical     no concerns. Needs refill on thyroid medication       Blood pressure 105/67, pulse 64, temperature 98.4  F (36.9  C), temperature source Oral, height 1.65 m (5' 4.96\"), weight 65.8 kg (145 lb), last menstrual period 04/15/2019, SpO2 99 %, not currently breastfeeding. Body mass index is 24.16 kg/m .  Patient Active Problem List   Diagnosis     Cardiomyopathy, familial (H)     Contact dermatitis     Keratosis pilaris     Arrhythmogenic left ventricular dysplasia (H)     Hypothyroidism     Hyperlipidemia LDL goal <130       Wt Readings from Last 2 Encounters:   19 65.8 kg (145 lb)   18 62.8 kg (138 lb 8 oz)     BP Readings from Last 3 Encounters:   19 105/67   18 122/78   18 102/61         Current Outpatient Medications   Medication     atorvastatin (LIPITOR) 20 MG tablet     cholecalciferol (VITAMIN  -D) 1000 UNITS capsule     levothyroxine (SYNTHROID/LEVOTHROID) 200 MCG tablet     metoprolol succinate ER (TOPROL-XL) 25 MG 24 hr tablet     metroNIDAZOLE (METROGEL) 0.75 % external gel     triamcinolone (KENALOG) 0.1 % cream     No current facility-administered medications for this visit.        Social History     Tobacco Use     Smoking status: Former Smoker     Years: 2.00     Types: Cigarettes     Last attempt to quit: 4/10/2001     Years since quittin.0     Smokeless tobacco: Never Used     Tobacco comment: 4 cig per day   Substance Use Topics     Alcohol use: Yes     Alcohol/week: 1.2 oz     Types: 2 Standard drinks or equivalent per week     Drug use: No       Health Maintenance Due   Topic Date Due     ALT Q1 YR  1979     HIV SCREEN (SYSTEM ASSIGNED)  1996     PHQ-2  2019       Lab Results   Component Value Date    PAP ASC-US 2018         May 7, 2019 1:29 PM  "

## 2019-05-08 DIAGNOSIS — E03.9 ACQUIRED HYPOTHYROIDISM: Primary | ICD-10-CM

## 2019-05-08 NOTE — PROGRESS NOTES
Called and spoke to patient regarding elevated TSH 13.4 with normal T4. She takes meds about 95% of the days.  Will take levo regularly and discussed taking on an empty stomach and without mild or calcium.    She will make a lab only appointment for blood test in 4 weeks. Future orders placed.  Patient understood and will FOLLOW UP as directed.  Vale Ashraf PA-C

## 2019-05-13 ENCOUNTER — DOCUMENTATION ONLY (OUTPATIENT)
Dept: CARE COORDINATION | Facility: CLINIC | Age: 39
End: 2019-05-13

## 2019-05-23 DIAGNOSIS — I42.8 ARRHYTHMOGENIC RIGHT VENTRICULAR DYSPLAS (H): ICD-10-CM

## 2019-05-30 DIAGNOSIS — I42.8 ARRHYTHMOGENIC LEFT VENTRICULAR DYSPLASIA (H): ICD-10-CM

## 2019-05-30 RX ORDER — METOPROLOL SUCCINATE 50 MG/1
50 TABLET, EXTENDED RELEASE ORAL DAILY
Qty: 90 TABLET | Refills: 3 | Status: SHIPPED | OUTPATIENT
Start: 2019-05-30 | End: 2019-07-12

## 2019-06-03 ENCOUNTER — ALLIED HEALTH/NURSE VISIT (OUTPATIENT)
Dept: CARDIOLOGY | Facility: CLINIC | Age: 39
End: 2019-06-03
Attending: INTERNAL MEDICINE
Payer: COMMERCIAL

## 2019-06-03 DIAGNOSIS — I42.8 ARRHYTHMOGENIC LEFT VENTRICULAR DYSPLASIA (H): ICD-10-CM

## 2019-06-04 RX ORDER — METOPROLOL SUCCINATE 25 MG/1
TABLET, EXTENDED RELEASE ORAL
Qty: 90 TABLET | Refills: 0 | OUTPATIENT
Start: 2019-06-04

## 2019-06-15 ENCOUNTER — MEDICAL CORRESPONDENCE (OUTPATIENT)
Dept: CARDIOLOGY | Facility: CLINIC | Age: 39
End: 2019-06-15
Payer: COMMERCIAL

## 2019-06-15 PROCEDURE — 0298T ZZC EXT ECG > 48HR TO 21 DAY REVIEW AND INTERPRETATN: CPT | Performed by: INTERNAL MEDICINE

## 2019-07-08 DIAGNOSIS — I42.8 ARRHYTHMOGENIC RIGHT VENTRICULAR CARDIOMYOPATHY (H): Primary | ICD-10-CM

## 2019-07-12 ENCOUNTER — OFFICE VISIT (OUTPATIENT)
Dept: CARDIOLOGY | Facility: CLINIC | Age: 39
End: 2019-07-12
Attending: INTERNAL MEDICINE
Payer: COMMERCIAL

## 2019-07-12 VITALS
DIASTOLIC BLOOD PRESSURE: 69 MMHG | WEIGHT: 143.7 LBS | OXYGEN SATURATION: 99 % | SYSTOLIC BLOOD PRESSURE: 103 MMHG | HEIGHT: 64 IN | HEART RATE: 73 BPM | BODY MASS INDEX: 24.53 KG/M2

## 2019-07-12 DIAGNOSIS — E03.9 ACQUIRED HYPOTHYROIDISM: Primary | ICD-10-CM

## 2019-07-12 DIAGNOSIS — I42.8 ARRHYTHMOGENIC RIGHT VENTRICULAR CARDIOMYOPATHY (H): ICD-10-CM

## 2019-07-12 DIAGNOSIS — I42.8 ARRHYTHMOGENIC LEFT VENTRICULAR DYSPLASIA (H): ICD-10-CM

## 2019-07-12 DIAGNOSIS — E03.9 ACQUIRED HYPOTHYROIDISM: ICD-10-CM

## 2019-07-12 DIAGNOSIS — R00.2 PALPITATIONS: Primary | ICD-10-CM

## 2019-07-12 LAB
ALBUMIN SERPL-MCNC: 4 G/DL (ref 3.4–5)
ALP SERPL-CCNC: 61 U/L (ref 40–150)
ALT SERPL W P-5'-P-CCNC: 22 U/L (ref 0–50)
ANION GAP SERPL CALCULATED.3IONS-SCNC: 3 MMOL/L (ref 3–14)
AST SERPL W P-5'-P-CCNC: 13 U/L (ref 0–45)
BASOPHILS # BLD AUTO: 0.1 10E9/L (ref 0–0.2)
BASOPHILS NFR BLD AUTO: 0.8 %
BILIRUB SERPL-MCNC: 0.4 MG/DL (ref 0.2–1.3)
BUN SERPL-MCNC: 14 MG/DL (ref 7–30)
CALCIUM SERPL-MCNC: 9 MG/DL (ref 8.5–10.1)
CHLORIDE SERPL-SCNC: 105 MMOL/L (ref 94–109)
CO2 SERPL-SCNC: 28 MMOL/L (ref 20–32)
CREAT SERPL-MCNC: 0.74 MG/DL (ref 0.52–1.04)
DIFFERENTIAL METHOD BLD: ABNORMAL
EOSINOPHIL # BLD AUTO: 0.2 10E9/L (ref 0–0.7)
EOSINOPHIL NFR BLD AUTO: 2 %
ERYTHROCYTE [DISTWIDTH] IN BLOOD BY AUTOMATED COUNT: 12.9 % (ref 10–15)
GFR SERPL CREATININE-BSD FRML MDRD: >90 ML/MIN/{1.73_M2}
GLUCOSE SERPL-MCNC: 88 MG/DL (ref 70–99)
HCT VFR BLD AUTO: 42.7 % (ref 35–47)
HGB BLD-MCNC: 13.4 G/DL (ref 11.7–15.7)
IMM GRANULOCYTES # BLD: 0 10E9/L (ref 0–0.4)
IMM GRANULOCYTES NFR BLD: 0.4 %
LYMPHOCYTES # BLD AUTO: 1.6 10E9/L (ref 0.8–5.3)
LYMPHOCYTES NFR BLD AUTO: 22 %
MCH RBC QN AUTO: 29.1 PG (ref 26.5–33)
MCHC RBC AUTO-ENTMCNC: 31.4 G/DL (ref 31.5–36.5)
MCV RBC AUTO: 93 FL (ref 78–100)
MONOCYTES # BLD AUTO: 0.6 10E9/L (ref 0–1.3)
MONOCYTES NFR BLD AUTO: 7.9 %
NEUTROPHILS # BLD AUTO: 4.9 10E9/L (ref 1.6–8.3)
NEUTROPHILS NFR BLD AUTO: 66.9 %
NRBC # BLD AUTO: 0 10*3/UL
NRBC BLD AUTO-RTO: 0 /100
PLATELET # BLD AUTO: 303 10E9/L (ref 150–450)
POTASSIUM SERPL-SCNC: 4.3 MMOL/L (ref 3.4–5.3)
PROT SERPL-MCNC: 7.3 G/DL (ref 6.8–8.8)
RBC # BLD AUTO: 4.61 10E12/L (ref 3.8–5.2)
SODIUM SERPL-SCNC: 137 MMOL/L (ref 133–144)
T4 FREE SERPL-MCNC: 1.01 NG/DL (ref 0.76–1.46)
TSH SERPL DL<=0.005 MIU/L-ACNC: 2.32 MU/L (ref 0.4–4)
WBC # BLD AUTO: 7.4 10E9/L (ref 4–11)

## 2019-07-12 PROCEDURE — 85025 COMPLETE CBC W/AUTO DIFF WBC: CPT | Performed by: INTERNAL MEDICINE

## 2019-07-12 PROCEDURE — 93010 ELECTROCARDIOGRAM REPORT: CPT | Mod: ZP | Performed by: INTERNAL MEDICINE

## 2019-07-12 PROCEDURE — 80053 COMPREHEN METABOLIC PANEL: CPT | Performed by: INTERNAL MEDICINE

## 2019-07-12 PROCEDURE — 84443 ASSAY THYROID STIM HORMONE: CPT | Performed by: INTERNAL MEDICINE

## 2019-07-12 PROCEDURE — 84439 ASSAY OF FREE THYROXINE: CPT | Performed by: INTERNAL MEDICINE

## 2019-07-12 PROCEDURE — 36415 COLL VENOUS BLD VENIPUNCTURE: CPT | Performed by: INTERNAL MEDICINE

## 2019-07-12 PROCEDURE — 99214 OFFICE O/P EST MOD 30 MIN: CPT | Mod: GC | Performed by: INTERNAL MEDICINE

## 2019-07-12 PROCEDURE — G0463 HOSPITAL OUTPT CLINIC VISIT: HCPCS | Mod: ZF

## 2019-07-12 RX ORDER — METOPROLOL SUCCINATE 100 MG/1
50 TABLET, EXTENDED RELEASE ORAL DAILY
Qty: 90 TABLET | Refills: 3 | Status: SHIPPED | OUTPATIENT
Start: 2019-07-12 | End: 2019-09-23

## 2019-07-12 ASSESSMENT — MIFFLIN-ST. JEOR: SCORE: 1301.82

## 2019-07-12 ASSESSMENT — PAIN SCALES - GENERAL: PAINLEVEL: NO PAIN (0)

## 2019-07-12 NOTE — PROGRESS NOTES
HPI:    38 yo female with arrhythmogenic cardiomyopathy with (frameshift [id number]) presents for ongoing evaluation and management.   Overall patient reports that she has been feeling well.  She denies any chest pain or pressure, sob/iniguez, orthopnea, pnd, palpitations, syncope/presyncope or ozzy.    CARDIAC HISTORY: Pt's brother suffered sudden cardiac death in 2012 while playing basketball - he was successfully resuscitated with AED. 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  After patient was found to be mutation positive she underwent cardiac MRI in 2012 which did not reveal any significant findings for right sided involvement of ARVC.  Holter was also unremarkable.  Later patient's mother had MRI and also found to have left-sided (septal) involvement of ARVC.  Given this in addition to carefully examining cardiac MRI from 2015 for left sided involvement of ARVC, past MRI was also reexamined and patient was noted to also have left-sided involvement of ARVC.     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      Hypothyroidism    Hyperlipidemia         PAST SURGICAL HISTORY:  Past Surgical History:   Procedure Laterality Date     DENTAL SURGERY       Strabismus Surgery         ALLERGIES   No Known Allergies    FAMILY HISTORY:  Family History   Problem Relation Age of Onset     Cancer Maternal Grandfather         Bladder     Other - See Comments Mother 58        hyperlipidemia, hypothyroidism, ARVD gene +     Other - See Comments Father         hyperlipidemia, hypothyroidism     Lupus Maternal Grandmother      Other - See Comments Brother 32        (Amari) ARVD s/p sudden cardiac arrest     Cardiovascular Brother 30        (Young) MI at age 30; type 1 DM; ARVD gene negative;      Diabetes Type 1 Brother      Other - See  "Comments Other         Alcohol Abuse     Diabetes Daughter 3        type I       EXAM:  /69 (BP Location: Left arm, Patient Position: Chair, Cuff Size: Adult Regular)   Pulse 73   Ht 1.626 m (5' 4\")   Wt 65.2 kg (143 lb 11.2 oz)   SpO2 99%   BMI 24.67 kg/m    In general, the patient is a pleasant female in no apparent distress.    HEENT: NC/AT.    Sclerae white, not injected.  Nares clear.  Pharynx without erythema or exudate.  Dentition intact.    Neck:  No jugular venous distension.   Heart: RRR. Normal S1, S2 splits physiologically. No murmur, rub, click, or gallop. The PMI is in the 5th ICS in the midclavicular line. There is no heave.    Lungs: CTA.  No ronchi, wheezes, rales.  No dullness to percussion.   Abdomen: Soft, nontender, nondistended.   Extremities: No clubbing, cyanosis, or edema.      Labs:  LIPID RESULTS:  Lab Results   Component Value Date    CHOL 204.0 (H) 05/07/2019    HDL 81.0 05/07/2019    .0 05/07/2019    LDL 74 07/30/2013    TRIG 62.0 05/07/2019    CHOLHDLRATIO 2.5 05/07/2019     CBC RESULTS:  Lab Results   Component Value Date    WBC 7.4 07/12/2019    RBC 4.61 07/12/2019    HGB 13.4 07/12/2019    HCT 42.7 07/12/2019    MCV 93 07/12/2019    MCH 29.1 07/12/2019    MCHC 31.4 (L) 07/12/2019    RDW 12.9 07/12/2019     07/12/2019       BMP RESULTS:  Lab Results   Component Value Date     07/12/2019    POTASSIUM 4.3 07/12/2019    CHLORIDE 105 07/12/2019    CO2 28 07/12/2019    ANIONGAP 3 07/12/2019    GLC 88 07/12/2019    BUN 14 07/12/2019    CR 0.74 07/12/2019    GFRESTIMATED >90 07/12/2019    GFRESTBLACK >90 07/12/2019    ADINA 9.0 07/12/2019        Procedures:    Holter June 2013:  No significant arrythmias    Cardiac MRI 6/2013: Impressions:  1. Normal left-ventricular size and global function (LVEF 57%).   There were no regional wall- motion abnormalities.   2. Normal right-ventricular size with [normal right-ventricular systolic function (RVEF 55%). There are no " major or minor morphological criteria for ARVC (Per the revised criteria (2010).   3. Normal resting perfusion study. There was no late gadolinium enhancement to suggest prior infarct, inflammation, or infiltration.   4. Sub optimal quality of study due to susceptibility and RF artifacts   5. Compared to cardiac MRI dated 04/2012 there appears no change in RV/LV function.  CARDIAC MRI: 7/1/14   IMPRESSION:   1. Normal left ventricular size and systolic function with a calculated ejection fraction of 55 %.   2. Normal right ventricular size and systolic function with a calculated ejection fraction of 56%. There are no major or minor morphological criteria for ARVC (Per the revised criteria (2010).   3. On delayed enhancement imaging, there is no abnormal hyperenhancement to suggest myocardial scar/inflammation/infiltration In comparison with prior study dated 6/10/2013, no significant changes are noted.  Examination: MR CARDIAC W CONTRAST   Date: 12/4/2015 2:30 PM  Indication: 35-year-old female with family history of ARVC  Comparison: Cardiac MRIs of 07/01/2014, 06/10/2013 and 04/05/2012  IMPRESSION:  Arrhythmogenic cardiomyopathy with left ventricular involvement based  on tissue characterization by delayed enhancement imaging, and no  right ventricular involvement based on 2010 Task Force criteria.  Review of prior cardiac MRIs shows the same areas of hyperenhancement  dating back to 04/05/2012 with no obvious changes in the interim period.    EKG 12/15/15:  Normal sinus rhyhtm. Sinus arrhythmia.  Unchanged from prior.    Holter 12/2015      Zio-monitor 6/15/18        CMR Report 06-                              Clinical history: 37-year-old female with arrhythmogenic cardiomyopathy and new symptoms of palpitations.  Comparison CMRs: 04/05/2012, 06/10/2013, 07/01/2014 and 12/04/2015  1. The LV is normal in cavity size and wall thickness. The global systolic function is mildly reduced. The  LVEF is 52%. There  are no regional wall motion abnormalities.  2. The RV is normal in cavity size. The global systolic function is normal. The RVEF is 61%. There is no  regional RV akinesia, dyskinesia or dyssynchronous RV contraction. There are no findings to satisfy CMR  criteria for the diagnosis of ARVC as per the 2010 Task Force criteria.  3. Both atria are normal in size.  4. There is no significant valvular disease.   5. Late gadolinium enhancement imaging shows epicardial hyperenhancement of the right ventricular aspect of  the mid inferoseptal segment, mid-myocardial hyperenhancement of the apical septal segment and epicardial  hyperenhancement of the mid anterolateral segment. These match the areas of fatty replacement on other  imaging.  6. Regadenoson perfusion imaging shows no ischemia.  7. There is no pericardial effusion or thickening.  CONCLUSIONS: Arrhythmogenic cardiomyopathy with left ventricular involvement based on tissue  characterization by delayed enhancement imaging, and no right ventricular involvement based on 2010 Task  Force criteria. There have been no changes when compared to the prior studies dating back to 04/05/2012. No  Ischemia.    Cardiopulmonary Stress Test (7/2018):               Zio-monitor 6/3/2019: (2 days 11 hours)      Assessment and Plan:    38 yo female with arrhythmogenic cardiomyopathy (frameshift [id number]) presents for ongoing evaluation and management.    # Arrhythmogenic cardiomyopathy (frameshift [id number]):   At last visit, ordered CPX, and repeat Zio patch. We also initiated Toprol XL and titrated to 50 mg daily which she is tolerating well. Pt remains asymptomatic. EKG's unremarkable.  Recent Zio also revealed no significant arrhythmias. Last MRI in 2018 confirms no progression. Have had extensive discussions with patient about pathophysiology of ARVC. Reviewed with patient ACC/AHA 2004 guidelines on exercise safety with ARVC. Also discussed with patient the need to avoid all  stimulants including Sudafed and all ephedrine containing products as well as methamphetamines and cocaine.  Pt again reminded that all first degree relatives should be screened for ARVC with cardiac MRI, EKG and Holter.  If family members decide to pursue genetic testing, then clinical screening would be indicated in gene positive, not gene negative, individuals.     She has previously seen Dr. Gordillo regarding possible ICD implantation especially with SCD event in first degree family member. It would be our recommendation for implantable loop recorder to help further risk stratify. Would also gently increase Toprol XL as tolerated.     Plan: recommend implantable loop recorder, HR test with two flights of stairs, increase Toprol XL to 75 mg daily for one month and then to 100 mg if tolerating    Follow-up: 1 year in clinic with EKG prior;  Cardiac MR, CPX, signal averaged EKG due 2022.     Will be happy to see sooner if change in clinical status or new questions/concerns arise.    Seen and discussed with Dr. Medrano.    Gary Ulloa  Cardiology Fellow      I have reviewed today's vital signs, notes, medications, labs and imaging. I have also seen and examined the patient and agree with the findings and plan as outlined above.    Haylie Medrano MD  Section Head - Advanced Heart Failure, Transplantation and Mechanical Circulatory Support  Director - Adult Congenital and Cardiovascular Genetics Center  Associate Professor of Medicine, [facility]

## 2019-07-12 NOTE — NURSING NOTE
Labs: Patient was given results of the laboratory testing obtained today. Patient was instructed to return for the next laboratory testing in 1 year . Patient demonstrated understanding of this information and agreed to call with further questions or concerns.     Med Reconcile: Reviewed and verified all current medications with the patient. The updated medication list was printed and given to the patient.    EP Procedure: Patient given instructions regarding Loop Recorder placement.  Discussed purpose, preparation, and procedure with the patient. Patient demonstrated understanding of this information and agreed to call with further questions or concerns.    Medication Change: Increase Metoprolol XL to 75 mg daily for one month, then 100 mg daily.  Patient was educated regarding prescribed medication change, including discussion of the indication, administration, side effects, and when to report to MD or RN. Patient demonstrated understanding of this information and agreed to call with further questions or concerns.    Patient stated she understood all health information given and agreed to call with further questions or concerns.    Diego Nicole, RN  RN Care Coordinator  [facility]  [phone]

## 2019-07-12 NOTE — NURSING NOTE
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     Kem Diaz CMA  9:34 AM

## 2019-07-12 NOTE — LETTER
7/12/2019      RE: Maren Bee  [address] [place] [address]       Dear Colleague,    Thank you for the opportunity to participate in the care of your patient, Maren Bee, at the [facility] HE[facility] at [facility]. Please see a copy of my visit note below.    HPI:    40 yo female with arrhythmogenic cardiomyopathy with (frameshift [id number]) presents for ongoing evaluation and management.   Overall patient reports that she has been feeling well.  She denies any chest pain or pressure, sob/iniguez, orthopnea, pnd, palpitations, syncope/presyncope or ozzy.    CARDIAC HISTORY: Pt's brother suffered sudden cardiac death in 2012 while playing basketball - he was successfully resuscitated with AED. 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  After patient was found to be mutation positive she underwent cardiac MRI in 2012 which did not reveal any significant findings for right sided involvement of ARVC.  Holter was also unremarkable.  Later patient's mother had MRI and also found to have left-sided (septal) involvement of ARVC.  Given this in addition to carefully examining cardiac MRI from 2015 for left sided involvement of ARVC, past MRI was also reexamined and patient was noted to also have left-sided involvement of ARVC.     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      Hypothyroidism    Hyperlipidemia         PAST SURGICAL HISTORY:  Past Surgical History:   Procedure Laterality Date     DENTAL SURGERY       Strabismus Surgery         ALLERGIES   No Known Allergies    FAMILY HISTORY:  Family History   Problem Relation Age of Onset     Cancer Maternal Grandfather         Bladder     Other - See Comments Mother 58        hyperlipidemia, hypothyroidism, ARVD  "gene +     Other - See Comments Father         hyperlipidemia, hypothyroidism     Lupus Maternal Grandmother      Other - See Comments Brother 32        (Amari) ARVD s/p sudden cardiac arrest     Cardiovascular Brother 30        (Young) MI at age 30; type 1 DM; ARVD gene negative;      Diabetes Type 1 Brother      Other - See Comments Other         Alcohol Abuse     Diabetes Daughter 3        type I       EXAM:  /69 (BP Location: Left arm, Patient Position: Chair, Cuff Size: Adult Regular)   Pulse 73   Ht 1.626 m (5' 4\")   Wt 65.2 kg (143 lb 11.2 oz)   SpO2 99%   BMI 24.67 kg/m     In general, the patient is a pleasant female in no apparent distress.    HEENT: NC/AT.    Sclerae white, not injected.  Nares clear.  Pharynx without erythema or exudate.  Dentition intact.    Neck:  No jugular venous distension.   Heart: RRR. Normal S1, S2 splits physiologically. No murmur, rub, click, or gallop. The PMI is in the 5th ICS in the midclavicular line. There is no heave.    Lungs: CTA.  No ronchi, wheezes, rales.  No dullness to percussion.   Abdomen: Soft, nontender, nondistended.   Extremities: No clubbing, cyanosis, or edema.      Labs:  LIPID RESULTS:  Lab Results   Component Value Date    CHOL 204.0 (H) 05/07/2019    HDL 81.0 05/07/2019    .0 05/07/2019    LDL 74 07/30/2013    TRIG 62.0 05/07/2019    CHOLHDLRATIO 2.5 05/07/2019     CBC RESULTS:  Lab Results   Component Value Date    WBC 7.4 07/12/2019    RBC 4.61 07/12/2019    HGB 13.4 07/12/2019    HCT 42.7 07/12/2019    MCV 93 07/12/2019    MCH 29.1 07/12/2019    MCHC 31.4 (L) 07/12/2019    RDW 12.9 07/12/2019     07/12/2019       BMP RESULTS:  Lab Results   Component Value Date     07/12/2019    POTASSIUM 4.3 07/12/2019    CHLORIDE 105 07/12/2019    CO2 28 07/12/2019    ANIONGAP 3 07/12/2019    GLC 88 07/12/2019    BUN 14 07/12/2019    CR 0.74 07/12/2019    GFRESTIMATED >90 07/12/2019    GFRESTBLACK >90 07/12/2019    ADINA 9.0 07/12/2019    "     Procedures:    Holter June 2013:  No significant arrythmias    Cardiac MRI 6/2013: Impressions:  1. Normal left-ventricular size and global function (LVEF 57%).   There were no regional wall- motion abnormalities.   2. Normal right-ventricular size with [normal right-ventricular systolic function (RVEF 55%). There are no major or minor morphological criteria for ARVC (Per the revised criteria (2010).   3. Normal resting perfusion study. There was no late gadolinium enhancement to suggest prior infarct, inflammation, or infiltration.   4. Sub optimal quality of study due to susceptibility and RF artifacts   5. Compared to cardiac MRI dated 04/2012 there appears no change in RV/LV function.  CARDIAC MRI: 7/1/14   IMPRESSION:   1. Normal left ventricular size and systolic function with a calculated ejection fraction of 55 %.   2. Normal right ventricular size and systolic function with a calculated ejection fraction of 56%. There are no major or minor morphological criteria for ARVC (Per the revised criteria (2010).   3. On delayed enhancement imaging, there is no abnormal hyperenhancement to suggest myocardial scar/inflammation/infiltration In comparison with prior study dated 6/10/2013, no significant changes are noted.  Examination: MR CARDIAC W CONTRAST   Date: 12/4/2015 2:30 PM  Indication: 35-year-old female with family history of ARVC  Comparison: Cardiac MRIs of 07/01/2014, 06/10/2013 and 04/05/2012  IMPRESSION:  Arrhythmogenic cardiomyopathy with left ventricular involvement based  on tissue characterization by delayed enhancement imaging, and no  right ventricular involvement based on 2010 Task Force criteria.  Review of prior cardiac MRIs shows the same areas of hyperenhancement  dating back to 04/05/2012 with no obvious changes in the interim period.    EKG 12/15/15:  Normal sinus rhyhtm. Sinus arrhythmia.  Unchanged from prior.    Holter 12/2015      Zio-monitor 6/15/18        CMR Report 06-                               Clinical history: 37-year-old female with arrhythmogenic cardiomyopathy and new symptoms of palpitations.  Comparison CMRs: 04/05/2012, 06/10/2013, 07/01/2014 and 12/04/2015  1. The LV is normal in cavity size and wall thickness. The global systolic function is mildly reduced. The  LVEF is 52%. There are no regional wall motion abnormalities.  2. The RV is normal in cavity size. The global systolic function is normal. The RVEF is 61%. There is no  regional RV akinesia, dyskinesia or dyssynchronous RV contraction. There are no findings to satisfy CMR  criteria for the diagnosis of ARVC as per the 2010 Task Force criteria.  3. Both atria are normal in size.  4. There is no significant valvular disease.   5. Late gadolinium enhancement imaging shows epicardial hyperenhancement of the right ventricular aspect of  the mid inferoseptal segment, mid-myocardial hyperenhancement of the apical septal segment and epicardial  hyperenhancement of the mid anterolateral segment. These match the areas of fatty replacement on other  imaging.  6. Regadenoson perfusion imaging shows no ischemia.  7. There is no pericardial effusion or thickening.  CONCLUSIONS: Arrhythmogenic cardiomyopathy with left ventricular involvement based on tissue  characterization by delayed enhancement imaging, and no right ventricular involvement based on 2010 Task  Force criteria. There have been no changes when compared to the prior studies dating back to 04/05/2012. No  Ischemia.    Cardiopulmonary Stress Test (7/2018):               Zio-monitor 6/3/2019: (2 days 11 hours)      Assessment and Plan:    40 yo female with arrhythmogenic cardiomyopathy (frameshift [id number]) presents for ongoing evaluation and management.    # Arrhythmogenic cardiomyopathy (frameshift [id number]):   At last visit, ordered CPX, and repeat Zio patch. We also initiated Toprol XL and titrated to 50 mg daily which she is tolerating well. Pt remains  asymptomatic. EKG's unremarkable.  Recent Zio also revealed no significant arrhythmias. Last MRI in 2018 confirms no progression. Have had extensive discussions with patient about pathophysiology of ARVC. Reviewed with patient ACC/AHA 2004 guidelines on exercise safety with ARVC. Also discussed with patient the need to avoid all stimulants including Sudafed and all ephedrine containing products as well as methamphetamines and cocaine.  Pt again reminded that all first degree relatives should be screened for ARVC with cardiac MRI, EKG and Holter.  If family members decide to pursue genetic testing, then clinical screening would be indicated in gene positive, not gene negative, individuals.     She has previously seen Dr. Gordillo regarding possible ICD implantation especially with SCD event in first degree family member. It would be our recommendation for implantable loop recorder to help further risk stratify. Would also gently increase Toprol XL as tolerated.     Plan: recommend implantable loop recorder, HR test with two flights of stairs, increase Toprol XL to 75 mg daily for one month and then to 100 mg if tolerating    Follow-up: 1 year in clinic with EKG prior;  Cardiac MR, CPX, signal averaged EKG due 2022.     Will be happy to see sooner if change in clinical status or new questions/concerns arise.    Seen and discussed with Dr. Medrano.    Gary Ulloa  Cardiology Fellow      I have reviewed today's vital signs, notes, medications, labs and imaging. I have also seen and examined the patient and agree with the findings and plan as outlined above.    Haylie Medrano MD  Section Head - Advanced Heart Failure, Transplantation and Mechanical Circulatory Support  Director - Adult Congenital and Cardiovascular Genetics Center  Associate Professor of Medicine, [facility]

## 2019-07-12 NOTE — PATIENT INSTRUCTIONS
"You were seen today in the Adult Congenital and Cardiovascular Genetics Clinic at the [facility].    Cardiology Providers you saw during your visit:  Dr Haylie Medrano    Diagnosis:  ARVC    Results:  Dr Mccauley reviewed the results of your EKG, Labs, and Ziopatch with you in clinic today    Recommendations:    1.  Continue to eat a heart healthy, low salt diet.  2.  Continue to get 20-30 minutes of aerobic activity, 4-5 days per week.  Examples of aerobic activity include walking, running, swimming, cycling, etc.  3.  Continue to observe good oral hygiene, with regular dental visits.  4.  Please increase your Metoprolol XL to 75 mg daily for one month then increase it to 100 mg daily if you tolerate it well.    5.  We will have Dr Gordillo give you a call to discuss implanting a loop recorder.      Vitals:    07/12/19 0915   BP: 103/69   BP Location: Left arm   Patient Position: Chair   Cuff Size: Adult Regular   Pulse: 73   SpO2: 99%   Weight: 65.2 kg (143 lb 11.2 oz)   Height: 1.626 m (5' 4\")       SBE prophylaxis:   Yes____  No_x___    Lifelong Bacterial Endocarditis Prophylaxis:  YES____  NO____    If YES is checked, follow the recommendations outlined below:   1. Take antibiotic(s) prior to recommended dental procedures and procedures on the respiratory tract or with infected skin, muscle or bones. SBE prophylaxis is not needed for routine GI and  procedures (ie. Colonoscopy or vaginal delivery)   2. Observe good oral hygiene daily, as advised by your dentist. Get regular professional dental care.   3. Keep cuts clean.   4. Infections should be treated promptly.   5. Symptoms of Infective Endocarditis could include: fever lasting more than 4-5 days or a recurrent fever that initially resolves but returns within 1-2 days)     Exercise restrictions:   Yes____  No_x___         If yes, list restrictions:  Must be allowed to rest if fatigued or SOB      Work restrictions:  Yes____  No__x__         If " yes, list restrictions:    FASTING CHOLESTEROL was checked in the last 5 years YES__x_  NO___  2019  Continue to eat a heart healthy, low salt diet.         ____ Fasting lipid panel order today         ____ No changes in medications          ____ I recommend the following changes in your cholesterol medications.:          ____ Please follow up for cholesterol screening at your primary care physician      Follow-up:  Follow up with Dr Medrano and Dr Gordillo in one year with a device check, EKG, and labs.     For after hours urgent needs, call [phone] and ask to speak to the Adult Congenital Physician on call.  Mention Job Code 0401.    For emergencies call 051.    For any scheduling needs, please call [facility] , at [phone]  Thank you for your visit today!  If you have questions or concerns about today's visit, please call me.    Diego Nicole, RN, BSN  Cardiology Care Coordinator  [facility]  [phone]    [address] [facility]  Mail Code [id number]  [place] [address]

## 2019-07-15 LAB — INTERPRETATION ECG - MUSE: NORMAL

## 2019-08-15 DIAGNOSIS — I42.8 ARRHYTHMOGENIC RIGHT VENTRICULAR CARDIOMYOPATHY (H): Primary | ICD-10-CM

## 2019-09-23 ENCOUNTER — MYC REFILL (OUTPATIENT)
Dept: CARDIOLOGY | Facility: CLINIC | Age: 39
End: 2019-09-23

## 2019-09-23 DIAGNOSIS — I42.8 ARRHYTHMOGENIC LEFT VENTRICULAR DYSPLASIA (H): ICD-10-CM

## 2019-09-25 ENCOUNTER — ANCILLARY PROCEDURE (OUTPATIENT)
Dept: MAMMOGRAPHY | Facility: CLINIC | Age: 39
End: 2019-09-25
Attending: PHYSICIAN ASSISTANT
Payer: COMMERCIAL

## 2019-09-25 DIAGNOSIS — Z12.31 ENCOUNTER FOR SCREENING MAMMOGRAM FOR BREAST CANCER: ICD-10-CM

## 2019-09-25 PROCEDURE — 77063 BREAST TOMOSYNTHESIS BI: CPT | Performed by: STUDENT IN AN ORGANIZED HEALTH CARE EDUCATION/TRAINING PROGRAM

## 2019-09-25 PROCEDURE — 77067 SCR MAMMO BI INCL CAD: CPT | Performed by: STUDENT IN AN ORGANIZED HEALTH CARE EDUCATION/TRAINING PROGRAM

## 2019-09-26 RX ORDER — METOPROLOL SUCCINATE 100 MG/1
100 TABLET, EXTENDED RELEASE ORAL DAILY
Qty: 90 TABLET | Refills: 3 | Status: SHIPPED | OUTPATIENT
Start: 2019-09-26 | End: 2020-09-30

## 2019-09-26 NOTE — TELEPHONE ENCOUNTER
Per Dr. Medrano's clinic note, to increase to 100 mg if tolerating. Prescription updated to reflect 100 mg tablets.

## 2019-09-27 ENCOUNTER — TRANSFERRED RECORDS (OUTPATIENT)
Dept: HEALTH INFORMATION MANAGEMENT | Facility: CLINIC | Age: 39
End: 2019-09-27

## 2019-09-28 ENCOUNTER — HEALTH MAINTENANCE LETTER (OUTPATIENT)
Age: 39
End: 2019-09-28

## 2019-12-06 ENCOUNTER — CARE COORDINATION (OUTPATIENT)
Dept: CARDIOLOGY | Facility: CLINIC | Age: 39
End: 2019-12-06

## 2019-12-06 NOTE — PROGRESS NOTES
Patient called. She sees Dr. Medrano. At her last appointment in July 2019, she was told that Dr. Gordillo would be calling her. She never received a call and so is calling to schedule an appointment. Patient set up in clinic for January 3 with Dr. Gordillo.

## 2019-12-26 NOTE — PATIENT INSTRUCTIONS
You were seen today in the Adult Congenital and Cardiovascular Genetics Clinic at the [facility].    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    Recommendations:    1. Continue to eat a heart healthy, low salt diet.  2. Continue to get 20-30 minutes of aerobic activity, 4-5 days per week.  Examples of aerobic activity include walking, running, swimming, cycling, etc.  3. Continue to observe good oral hygiene, with regular dental visits.  4.       SBE prophylaxis:   Yes____  No_X___    Lifelong Bacterial Endocarditis Prophylaxis:  YES____  NO____    If YES is checked, follow the recommendations outlined below:  1. Take antibiotic(s) prior to recommended dental procedures and procedures on the respiratory tract or with infected skin, muscle or bones. SBE prophylaxis is not needed for routine GI and  procedures (ie. Colonoscopy or vaginal delivery)  2. Observe good oral hygiene daily, as advised by your dentist. Get regular professional dental care.  3. Keep cuts clean.  4. Infections should be treated promptly.  5. Symptoms of Infective Endocarditis could include: fever lasting more than 4-5 days or a recurrent fever that initially resolves but returns within 1-2 days)      Exercise restrictions:   Yes__X__  No____         If yes, list restrictions:  Must be allowed to rest if fatigued or SOB      Work restrictions:  Yes____  No_X___         If yes, list restrictions:    FASTING CHOLESTEROL was checked in the last 5 years YES_X__  NO___ (2019)  Continue to eat a heart healthy, low salt diet.         ____ Fasting lipid panel order today         ____ No changes in medications          ____ I recommend the following changes in your cholesterol medications.:          ____ Please follow up for cholesterol screening at your primary care physician      Follow-up:  Follow up    If  you have questions or concerns please contact us at:    Monisha Estrada, MSN, RN, CNL    Christine Torres (Scheduling)  Nurse Care Coordinator     Clinic   Adult Congenital and CV Genetics   Adult Congenital and CV Genetic  [facility]   [facility]  (P) [phone]     (P) [phone]  [email or web address]   (F) [phone]        For after hours urgent needs, call [phone] and ask to speak to the Adult Congenital Physician on call.  Mention Job Code 0401.    For emergencies call 911.    [facility]   Clinics and Surgery Center  Mail Code [id number]  [address] [address]

## 2020-01-03 ENCOUNTER — OFFICE VISIT (OUTPATIENT)
Dept: CARDIOLOGY | Facility: CLINIC | Age: 40
End: 2020-01-03
Attending: INTERNAL MEDICINE
Payer: COMMERCIAL

## 2020-01-03 VITALS
HEIGHT: 64 IN | BODY MASS INDEX: 24.41 KG/M2 | HEART RATE: 63 BPM | OXYGEN SATURATION: 100 % | DIASTOLIC BLOOD PRESSURE: 72 MMHG | WEIGHT: 143 LBS | SYSTOLIC BLOOD PRESSURE: 102 MMHG

## 2020-01-03 DIAGNOSIS — I42.8 ARRHYTHMOGENIC LEFT VENTRICULAR DYSPLASIA (H): Primary | ICD-10-CM

## 2020-01-03 PROCEDURE — G0463 HOSPITAL OUTPT CLINIC VISIT: HCPCS | Mod: ZF

## 2020-01-03 PROCEDURE — 99214 OFFICE O/P EST MOD 30 MIN: CPT | Mod: ZP

## 2020-01-03 ASSESSMENT — MIFFLIN-ST. JEOR: SCORE: 1298.64

## 2020-01-03 ASSESSMENT — PAIN SCALES - GENERAL: PAINLEVEL: NO PAIN (0)

## 2020-01-03 NOTE — LETTER
1/3/2020      RE: Maren Bee  [address]  [place] [address]       Dear Colleague,    Thank you for the opportunity to participate in the care of your patient, Maren Bee, at the [facility] Please see a copy of my visit note below.    Clinical Cardiac Electrophysiology    Chief Complaint: arrhythmogenic right ventricular cardiomyopathy     HPI ([id number]):  I was happy to see Ms. Bee in consultation at the request of Dr. Medrano.  Mrs. Bee is a 35-year-old woman whose brother had an episode of aborted sudden cardiac death.  He was subsequently found to have arrhythmogenic right ventricular cardiomyopathy.  He tested positive for a mutation in the DSP gene.  The patient herself has also tested positive for this mutation.  Recent cardiac MRI (see lab section below) disclosed evidence of the left ventricular variant of arrhythmogenic cardiomyopathy.  She previously saw Dr. Lancaster, who did discuss risk stratification.  Please see his note as well.  They had discussed the purchase of an AED.        The patient does have a history of syncopal episodes many years ago.  Both of these episodes were felt to represent vasovagal syncope.  She had a Holter monitor placed on 12/15.  I have personally reviewed the recording.  It shows isolated PVCs.  Her PVCs were very infrequent.  She only had 58 PVCs in the entire recording.  There was no sustained or nonsustained ventricular arrhythmia seen.  The patient reported no symptoms.      In questioning today, she reports no further episodes of syncope.  She has had no presyncope.  She does exercise, but at a low to moderate level.  She has been instructed by Dr. Medrano to avoid strenuous or competitive exercise.  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       03Jan2020 Interval history:     I last saw   Rohit in 2016. She returns to discuss and re-review risk stratification with regards to sudden cardiac arrest.  She has been feeling well. She has not had syncope or near syncope. However, she has been considering implantable loop recorder for better monitoring. If the implantable loop recorder disclosed non-sustained VT she would strongly consider ICD placement.      Current Outpatient Medications   Medication Sig Dispense Refill     atorvastatin (LIPITOR) 20 MG tablet Take 1 tablet (20 mg) by mouth daily 90 tablet 3     Cholecalciferol (VITAMIN D3) 1000 units CAPS Take 1,000 Units by mouth       levothyroxine (SYNTHROID/LEVOTHROID) 200 MCG tablet Take 1 tablet (200 mcg) by mouth daily 90 tablet 3     metoprolol succinate ER (TOPROL-XL) 100 MG 24 hr tablet Take 1 tablet (100 mg) by mouth daily 90 tablet 3     metroNIDAZOLE (METROGEL) 0.75 % external gel Apply topically once       triamcinolone (KENALOG) 0.1 % cream Apply topically 2 times daily 80 g 1     cholecalciferol (VITAMIN  -D) 1000 UNITS capsule Take 1 capsule (1,000 Units) by mouth daily 30 capsule        Past Medical History:   Diagnosis Date     Cardiomyopathy, familial (H) 7/18/2012    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      Hypothyroidism        Past Surgical History:   Procedure Laterality Date     DENTAL SURGERY       Strabismus Surgery         Family History   Problem Relation Age of Onset     Cancer Maternal Grandfather         Bladder     Other - See Comments Mother 58        hyperlipidemia, hypothyroidism, ARVD gene +     Other - See Comments Father         hyperlipidemia, hypothyroidism     Lupus Maternal Grandmother      Other - See Comments Brother 32        (Amari) ARVD s/p sudden cardiac arrest     Cardiovascular Brother 30        (Young) MI at age 30; type 1 DM; ARVD gene negative;      Diabetes Type 1 Brother      Other - See Comments Other         Alcohol Abuse     Diabetes Daughter 3        type I       Social  "History     Tobacco Use     Smoking status: Former Smoker     Years: 2.00     Types: Cigarettes     Last attempt to quit: 4/10/2001     Years since quittin.7     Smokeless tobacco: Never Used   Substance Use Topics     Alcohol use: Yes     Alcohol/week: 2.0 standard drinks     Types: 2 Standard drinks or equivalent per week       No Known Allergies    ROS:   CONSTITUTIONAL:No report of fever, chills,  or change in weight  RESPIRATORY: No cough, wheezing, SOB, or hemoptysis  CARDIOVASCULAR: see HPI  MUSCULO-SKELETAL: No joint pain/swelling, no muslce pain  NEURO: No paresthesias, syncope, pre-syncope, light headness, dizziness or vertigo  ENDOCRINE: No temperature intolerance, no skin/hair changes  PSYCHIATRIC: No change in mood, feeling down/anxious, no change in sleep or appetite  GI: no melena or hematochezia, no change in bowel habits  : no hematuria or dysurea, no hesitancy, dribbling or incontinence  HEME: no easy bruising or bleeding, no history of anemia, no history of blood clots  SKIN: no rashes or sores, no unusual itching    Physical Examination:  Vitals: /72 (BP Location: Right arm, Patient Position: Chair, Cuff Size: Adult Regular)   Pulse 63   Ht 1.626 m (5' 4\")   Wt 64.9 kg (143 lb)   SpO2 100%   BMI 24.55 kg/m     BMI= Body mass index is 24.55 kg/m .    GENERAL APPEARANCE: healthy, alert and no distress  HEENT: no icterus, no xanthelasmas, normal pupil size and reaction, normal palate, mucosa moist, no cyanosis.  NECK: no adenopathy, no asymmetry, masses, or scars, thyroid normal to palpation, carotid upstrokes are normal bilaterally, no cervical bruits, JVP is not visible  CHEST: lungs clear to auscultation - no rales, rhonchi or wheezes, no use of accessory muscles, no retractions, respirations are unlabored, normal respiratory rate, no kyphosis, no scoliosis  CARDIOVASCULAR: regular rhythm, normal S1 with physiologic split S2, no S3 or S4 and no murmur, click or rub, precordium " quiet with normal PMI.  ABDOMEN: soft, non tender, without hepatosplenomegaly, no masses palpable, bowel sounds normal, aorta not enlarged by palpation, no abdominal bruits  EXTREMITIES: no clubbing, cyanosis or edema  NEURO: alert and oriented to person/place/time, normal speech, gait and affect  VASC: Radial, femoral, dorsalis pedis and posterior tibialis pulses are normal in volumes and symmetric bilaterally. No vascular bruits heard.  SKIN: no ecchymoses, no rashes      Laboratory and diagnostic data reviewed 2020:        [facility]                                                      CMR Report       MRN:                   [id number]                                  Name:        MATT SILVER                                  :                   1980                                  Scan Date:    2018 07:24:33                                     Signed by Marcin Lizama (uid:9)  11:37:06.     SUMMARY   ==========================================================================================================     Clinical history: 37-year-old female with arrhythmogenic cardiomyopathy and new symptoms of palpitations.  Comparison CMRs: 2012, 06/10/2013, 2014 and 2015     1. The LV is normal in cavity size and wall thickness. The global systolic function is mildly reduced. The  LVEF is 52%. There are no regional wall motion abnormalities.     2. The RV is normal in cavity size. The global systolic function is normal. The RVEF is 61%. There is no  regional RV akinesia, dyskinesia or dyssynchronous RV contraction. There are no findings to satisfy CMR  criteria for the diagnosis of ARVC as per the 2010 Task Force criteria.     3. Both atria are normal in size.     4. There is no significant valvular disease.      5. Late gadolinium enhancement imaging shows epicardial hyperenhancement of the right ventricular aspect of  the mid inferoseptal segment,  mid-myocardial hyperenhancement of the apical septal segment and epicardial  hyperenhancement of the mid anterolateral segment. These match the areas of fatty replacement on other  imaging.     6. Regadenoson perfusion imaging shows no ischemia.     7. There is no pericardial effusion or thickening.     CONCLUSIONS: Arrhythmogenic cardiomyopathy with left ventricular involvement based on tissue  characterization by delayed enhancement imaging, and no right ventricular involvement based on 2010 Task  Force criteria. There have been no changes when compared to the prior studies dating back to 04/05/2012. No  Ischemia.      Assessment and recommendations:    1) Arrhythmogenic cardiomyopathy - LV involvement; FH of sudden cardiac arrest in first-degree relative.    We reviewed the possible benefits of implantable loop recorder monitoring as well as the limitations. If non-sustained VT is found she would wish to proceed with ICD placement.     Portions of this note were dictated using speech recognition software. The note has been proofread but errors in the text may have been overlooked. Please contact me if there are any concerns regarding the accuracy of the dictation.     I appreciate the chance to help with Mrs. Bee's care. Please let me know if you have any questions or concerns.    Please do not hesitate to contact me if you have any questions/concerns.     Sincerely,     [facility]

## 2020-01-07 ENCOUNTER — TELEPHONE (OUTPATIENT)
Dept: CARDIOLOGY | Facility: CLINIC | Age: 40
End: 2020-01-07

## 2020-01-07 ENCOUNTER — MYC MEDICAL ADVICE (OUTPATIENT)
Dept: CARDIOLOGY | Facility: CLINIC | Age: 40
End: 2020-01-07

## 2020-01-07 NOTE — PROGRESS NOTES
Clinical Cardiac Electrophysiology    Chief Complaint: arrhythmogenic right ventricular cardiomyopathy     [facility] ([id number]):  I was happy to see Ms. Bee in consultation at the request of Dr. Medrano.  Mrs. Bee is a 35-year-old woman whose brother had an episode of aborted sudden cardiac death.  He was subsequently found to have arrhythmogenic right ventricular cardiomyopathy.  He tested positive for a mutation in the DSP gene.  The patient herself has also tested positive for this mutation.  Recent cardiac MRI (see lab section below) disclosed evidence of the left ventricular variant of arrhythmogenic cardiomyopathy.  She previously saw Dr. Lancaster, who did discuss risk stratification.  Please see his note as well.  They had discussed the purchase of an AED.        The patient does have a history of syncopal episodes many years ago.  Both of these episodes were felt to represent vasovagal syncope.  She had a Holter monitor placed on 12/15.  I have personally reviewed the recording.  It shows isolated PVCs.  Her PVCs were very infrequent.  She only had 58 PVCs in the entire recording.  There was no sustained or nonsustained ventricular arrhythmia seen.  The patient reported no symptoms.      In questioning today, she reports no further episodes of syncope.  She has had no presyncope.  She does exercise, but at a low to moderate level.  She has been instructed by Dr. Medrano to avoid strenuous or competitive exercise.  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       03Jan2020 Interval history:     I last saw Mrs. Bee in 2016. She returns to discuss and re-review risk stratification with regards to sudden cardiac arrest.  She has been feeling well. She has not had syncope or near syncope. However, she has been considering implantable loop recorder for better monitoring. If the implantable loop recorder disclosed  non-sustained VT she would strongly consider ICD placement.      Current Outpatient Medications   Medication Sig Dispense Refill     atorvastatin (LIPITOR) 20 MG tablet Take 1 tablet (20 mg) by mouth daily 90 tablet 3     Cholecalciferol (VITAMIN D3) 1000 units CAPS Take 1,000 Units by mouth       levothyroxine (SYNTHROID/LEVOTHROID) 200 MCG tablet Take 1 tablet (200 mcg) by mouth daily 90 tablet 3     metoprolol succinate ER (TOPROL-XL) 100 MG 24 hr tablet Take 1 tablet (100 mg) by mouth daily 90 tablet 3     metroNIDAZOLE (METROGEL) 0.75 % external gel Apply topically once       triamcinolone (KENALOG) 0.1 % cream Apply topically 2 times daily 80 g 1     cholecalciferol (VITAMIN  -D) 1000 UNITS capsule Take 1 capsule (1,000 Units) by mouth daily 30 capsule        Past Medical History:   Diagnosis Date     Cardiomyopathy, familial (H) 2012    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      Hypothyroidism        Past Surgical History:   Procedure Laterality Date     DENTAL SURGERY       Strabismus Surgery         Family History   Problem Relation Age of Onset     Cancer Maternal Grandfather         Bladder     Other - See Comments Mother 58        hyperlipidemia, hypothyroidism, ARVD gene +     Other - See Comments Father         hyperlipidemia, hypothyroidism     Lupus Maternal Grandmother      Other - See Comments Brother 32        (Amari) ARVD s/p sudden cardiac arrest     Cardiovascular Brother 30        (Young) MI at age 30; type 1 DM; ARVD gene negative;      Diabetes Type 1 Brother      Other - See Comments Other         Alcohol Abuse     Diabetes Daughter 3        type I       Social History     Tobacco Use     Smoking status: Former Smoker     Years: 2.00     Types: Cigarettes     Last attempt to quit: 4/10/2001     Years since quittin.7     Smokeless tobacco: Never Used   Substance Use Topics     Alcohol use: Yes     Alcohol/week: 2.0 standard drinks     Types: 2 Standard drinks or  "equivalent per week       No Known Allergies      ROS:   CONSTITUTIONAL:No report of fever, chills,  or change in weight  RESPIRATORY: No cough, wheezing, SOB, or hemoptysis  CARDIOVASCULAR: see HPI  MUSCULO-SKELETAL: No joint pain/swelling, no muslce pain  NEURO: No paresthesias, syncope, pre-syncope, light headness, dizziness or vertigo  ENDOCRINE: No temperature intolerance, no skin/hair changes  PSYCHIATRIC: No change in mood, feeling down/anxious, no change in sleep or appetite  GI: no melena or hematochezia, no change in bowel habits  : no hematuria or dysurea, no hesitancy, dribbling or incontinence  HEME: no easy bruising or bleeding, no history of anemia, no history of blood clots  SKIN: no rashes or sores, no unusual itching        Physical Examination:  Vitals: /72 (BP Location: Right arm, Patient Position: Chair, Cuff Size: Adult Regular)   Pulse 63   Ht 1.626 m (5' 4\")   Wt 64.9 kg (143 lb)   SpO2 100%   BMI 24.55 kg/m    BMI= Body mass index is 24.55 kg/m .    GENERAL APPEARANCE: healthy, alert and no distress  HEENT: no icterus, no xanthelasmas, normal pupil size and reaction, normal palate, mucosa moist, no cyanosis.  NECK: no adenopathy, no asymmetry, masses, or scars, thyroid normal to palpation, carotid upstrokes are normal bilaterally, no cervical bruits, JVP is not visible  CHEST: lungs clear to auscultation - no rales, rhonchi or wheezes, no use of accessory muscles, no retractions, respirations are unlabored, normal respiratory rate, no kyphosis, no scoliosis  CARDIOVASCULAR: regular rhythm, normal S1 with physiologic split S2, no S3 or S4 and no murmur, click or rub, precordium quiet with normal PMI.  ABDOMEN: soft, non tender, without hepatosplenomegaly, no masses palpable, bowel sounds normal, aorta not enlarged by palpation, no abdominal bruits  EXTREMITIES: no clubbing, cyanosis or edema  NEURO: alert and oriented to person/place/time, normal speech, gait and affect  VASC: " Radial, femoral, dorsalis pedis and posterior tibialis pulses are normal in volumes and symmetric bilaterally. No vascular bruits heard.  SKIN: no ecchymoses, no rashes      Laboratory and diagnostic data reviewed 2020:        [facility]                                                      CMR Report       MRN:                   [id number]                                  Name:        MATT SILVER                                  :                   1980                                  Scan Date:    2018 07:24:33                                     Signed by Marcin Lizama (uid:9)  11:37:06.     SUMMARY   ==========================================================================================================     Clinical history: 37-year-old female with arrhythmogenic cardiomyopathy and new symptoms of palpitations.  Comparison CMRs: 2012, 06/10/2013, 2014 and 2015     1. The LV is normal in cavity size and wall thickness. The global systolic function is mildly reduced. The  LVEF is 52%. There are no regional wall motion abnormalities.     2. The RV is normal in cavity size. The global systolic function is normal. The RVEF is 61%. There is no  regional RV akinesia, dyskinesia or dyssynchronous RV contraction. There are no findings to satisfy CMR  criteria for the diagnosis of ARVC as per the 2010 Task Force criteria.     3. Both atria are normal in size.     4. There is no significant valvular disease.      5. Late gadolinium enhancement imaging shows epicardial hyperenhancement of the right ventricular aspect of  the mid inferoseptal segment, mid-myocardial hyperenhancement of the apical septal segment and epicardial  hyperenhancement of the mid anterolateral segment. These match the areas of fatty replacement on other  imaging.     6. Regadenoson perfusion imaging shows no ischemia.     7. There is no pericardial effusion or  thickening.     CONCLUSIONS: Arrhythmogenic cardiomyopathy with left ventricular involvement based on tissue  characterization by delayed enhancement imaging, and no right ventricular involvement based on 2010 Task  Force criteria. There have been no changes when compared to the prior studies dating back to 04/05/2012. No  Ischemia.      Assessment and recommendations:    1) Arrhythmogenic cardiomyopathy - LV involvement; FH of sudden cardiac arrest in first-degree relative.    We reviewed the possible benefits of implantable loop recorder monitoring as well as the limitations. If non-sustained VT is found she would wish to proceed with ICD placement.     Portions of this note were dictated using speech recognition software. The note has been proofread but errors in the text may have been overlooked. Please contact me if there are any concerns regarding the accuracy of the dictation.     I appreciate the chance to help with Mrs. Bee's care. Please let me know if you have any questions or concerns.

## 2020-01-07 NOTE — TELEPHONE ENCOUNTER
EP Scheduling called the patient to schedule Loop Recorder Implant with Dr. Gordillo. The number [phone] was left for the patient to return the call and schedule the procedure.    Arely Goddard  Periop Electrophysiology   [phone]

## 2020-01-24 ENCOUNTER — HOSPITAL ENCOUNTER (OUTPATIENT)
Facility: CLINIC | Age: 40
Discharge: HOME | End: 2020-01-24
Attending: INTERNAL MEDICINE | Admitting: INTERNAL MEDICINE
Payer: COMMERCIAL

## 2020-01-24 ENCOUNTER — APPOINTMENT (OUTPATIENT)
Dept: MEDSURG UNIT | Facility: CLINIC | Age: 40
End: 2020-01-24
Attending: INTERNAL MEDICINE
Payer: COMMERCIAL

## 2020-01-24 VITALS
HEART RATE: 70 BPM | TEMPERATURE: 98.2 F | WEIGHT: 142.97 LBS | DIASTOLIC BLOOD PRESSURE: 55 MMHG | OXYGEN SATURATION: 100 % | HEIGHT: 64 IN | BODY MASS INDEX: 24.41 KG/M2 | RESPIRATION RATE: 16 BRPM | SYSTOLIC BLOOD PRESSURE: 99 MMHG

## 2020-01-24 DIAGNOSIS — I42.8 ARRHYTHMOGENIC LEFT VENTRICULAR DYSPLASIA (H): ICD-10-CM

## 2020-01-24 PROCEDURE — 25000125 ZZHC RX 250: Performed by: INTERNAL MEDICINE

## 2020-01-24 PROCEDURE — C1764 EVENT RECORDER, CARDIAC: HCPCS | Performed by: INTERNAL MEDICINE

## 2020-01-24 PROCEDURE — 33285 INSJ SUBQ CAR RHYTHM MNTR: CPT | Performed by: INTERNAL MEDICINE

## 2020-01-24 PROCEDURE — 40000166 ZZH STATISTIC PP CARE STAGE 1

## 2020-01-24 PROCEDURE — 33285 INSJ SUBQ CAR RHYTHM MNTR: CPT | Mod: GC | Performed by: INTERNAL MEDICINE

## 2020-01-24 DEVICE — SYS INS CARD MNTR REVEAL LINQ: Type: IMPLANTABLE DEVICE | Status: FUNCTIONAL

## 2020-01-24 RX ORDER — LIDOCAINE HYDROCHLORIDE AND EPINEPHRINE 10; 10 MG/ML; UG/ML
INJECTION, SOLUTION INFILTRATION; PERINEURAL
Status: DISCONTINUED | OUTPATIENT
Start: 2020-01-24 | End: 2020-01-24 | Stop reason: HOSPADM

## 2020-01-24 ASSESSMENT — MIFFLIN-ST. JEOR: SCORE: 1298.75

## 2020-01-24 NOTE — PRE-PROCEDURE
GENERAL PRE-PROCEDURE:   Procedure:  ILR implant  Date/Time:  1/24/2020 9:22 AM    Verbal consent obtained?: Yes    Written consent obtained?: Yes    Risks and benefits: Risks, benefits and alternatives were discussed    Consent given by:  Patient  Patient states understanding of procedure being performed: Yes    Patient's understanding of procedure matches consent: Yes    Procedure consent matches procedure scheduled: Yes    Appropriately NPO:  Yes  ASA Class:  Class 2- mild systemic disease, no acute problems, no functional limitations  Mallampati  :  Grade 2- soft palate, base of uvula, tonsillar pillars, and portion of posterior pharyngeal wall visible  Lungs:  Lungs clear with good breath sounds bilaterally  Heart:  Normal heart sounds and rate  History & Physical reviewed:  History and physical reviewed and no updates needed  Statement of review:  I have reviewed the lab findings, diagnostic data, medications, and the plan for sedation    DAVI Enciso CNP  Electrophysiology Consult Service  Pager: [phone]

## 2020-01-24 NOTE — PROGRESS NOTES
Returned from [facility] s/p loop implant.  VSS.  Denies pain.  Left sternal site CDI with Dermabond.  Dr. Gordillo and device representative in room.

## 2020-01-24 NOTE — PROGRESS NOTES
Arrived from home for a loop recorder implant.  VSS.  Denies pain.  Needs consent.  H&P current.  Resting in bed.

## 2020-01-24 NOTE — IP AVS SNAPSHOT
[facility], [facility],  Heart Cath Lab  [address] [address]  Phone:  [phone]                                    After Visit Summary   1/24/2020    Maren Bee    MRN: [id number]           After Visit Summary Signature Page    I have received my discharge instructions, and my questions have been answered. I have discussed any challenges I see with this plan with the nurse or doctor.    ..........................................................................................................................................  Patient/Patient Representative Signature      ..........................................................................................................................................  Patient Representative Print Name and Relationship to Patient    ..................................................               ................................................  Date                                   Time    ..........................................................................................................................................  Reviewed by Signature/Title    ...................................................              ..............................................  Date                                               Time          22EPIC Rev 08/18

## 2020-01-24 NOTE — IP AVS SNAPSHOT
MRN:[id number]                      After Visit Summary   1/24/2020    Maren Bee    MRN: [id number]           Visit Information        Department      1/24/2020  8:02 AM [facility], [place],  Heart Cath Lab          Review of your medicines      UNREVIEWED medicines. Ask your doctor about these medicines       Dose / Directions   atorvastatin 20 MG tablet  Commonly known as:  LIPITOR  Used for:  Arrhythmogenic left ventricular dysplasia (H), Palpitations, Hyperlipidemia LDL goal <70      Dose:  20 mg  Take 1 tablet (20 mg) by mouth daily  Quantity:  90 tablet  Refills:  3     * cholecalciferol 1000 units (25 mcg) capsule  Commonly known as:  VITAMIN D3      Dose:  1 capsule  Take 1 capsule (1,000 Units) by mouth daily  Quantity:  30 capsule  Refills:  0     * vitamin D3 25 MCG (1000 UT) Caps      Dose:  1,000 Units  Take 1,000 Units by mouth  Refills:  0     levothyroxine 200 MCG tablet  Commonly known as:  SYNTHROID/LEVOTHROID  Used for:  Other specified hypothyroidism      Dose:  200 mcg  Take 1 tablet (200 mcg) by mouth daily  Quantity:  90 tablet  Refills:  3     metoprolol succinate  MG 24 hr tablet  Commonly known as:  TOPROL-XL  Used for:  Arrhythmogenic left ventricular dysplasia (H)      Dose:  100 mg  Take 1 tablet (100 mg) by mouth daily  Quantity:  90 tablet  Refills:  3     metroNIDAZOLE 0.75 % external gel  Commonly known as:  METROGEL      Apply topically once  Refills:  0     triamcinolone 0.1 % external cream  Commonly known as:  KENALOG  Used for:  Other atopic dermatitis      Apply topically 2 times daily  Quantity:  80 g  Refills:  1         * This list has 2 medication(s) that are the same as other medications prescribed for you. Read the directions carefully, and ask your doctor or other care provider to review them with you.                  Protect others around you: Learn how to safely use, store and throw away your medicines at www.disposemymeds.org.        Follow-ups after your visit       Your next 10 appointments already scheduled    Jan 31, 2020  9:30 AM CST  (Arrive by 9:15 AM)  CARDIAC DEVICE CHECK - IN CLINIC with  CV DEVICE [address] [place] [address] Cardiac Services ([facility]) [address] [facility] [address]  3rd Floor  [facility] [address] [phone]         Care Instructions       Further instructions from your care team         Home Care after a Loop Recorder Implant  Wound care:  Check for signs of infection each day.  These include increased redness, swelling, drainage or a fever over 101 F (38.3 C).  Call us immediately if you see any of these signs.  You may shower 24 hours after the procedure.  Do not submerge the incision (in a bath tub, hot tub, or swimming pool) until fully healed.  Do not apply powder or lotion to the incision for 14 days.     Pain:   You may have mild pain for 3 to 5 days.  Take acetaminophen (Tylenol) or ibuprofen (Advil) for the pain.  Call us if the pain is severe or lasts more than 5 days.    Activity:  After 24 hours, slowly return to your normal activities.      Avoid anything that may cause rough contact or a hard hit to your chest.  This includes football, hockey, and other contact sports.    Follow Up Visits:  Return to the clinic in 7 to 10 days to have your loop recorder and wound checked.      Telling others about your device:  Before you have any medical tests or treatments, tell the doctors, dentists, and other care providers about your device.  There are a few tests and treatments that may interfere with your device.  Your care team may need to take special steps to keep you safe.  Before you leave the hospital, you will receive a temporary ID card.  A permanent card will be mailed to you about 6 to 8 weeks later.  Always carry the ID card with you.  It has important details about your device.  Safety near electrical equipment:  All of these are safe to use when in good repair  "-    Microwaves    Radios    Cordless phone    Remote controls    Small electrical tools  Security ferguson: It is okay to walk through security ferguson at the airports and department stores. Tell airport security that you have an implanted loop recorder.  Full-body scanners are safe.  Activator: Black and Gray (pictured below). Carry this with you. Press the button if you have symptoms.            Home Monitor: White and Blue (pictured below). Plug in at home. Use under device clinic direction.    Questions?  Please call [facility].    General inquiry: [phone]    Device Nurse:          Business Hours:  [phone]                         After Hours:  [phone]   Choose option 4, then ask for the on-call device nurse at job code [phone].    Your next device clinic appointment is scheduled on:     Friday January 31st at 9:30 am.          [facility]  Clinics and Surgery Center - Clinic [address]  [address] [place] [address]      Additional Information About Your Visit       "[employer] Information    [employer] gives you secure access to your electronic health record. If you see a primary care provider, you can also send messages to your care team and make appointments. If you have questions, please call your primary care clinic.  If you do not have a primary care provider, please call [phone] and they will assist you.       Care EveryWhere ID    This is your Care EveryWhere ID. This could be used by other organizations to access your [facility] medical records  [id number]       Your Vitals Were  Most recent update: 1/24/2020 10:46 AM    Blood Pressure   99/55          Pulse   70          Temperature   98.2  F (36.8  C) (Oral)          Respirations   16          Height   1.626 m (5' 4.02\")             Weight   64.8 kg (142 lb 15.5 oz)    Pulse Oximetry   100%    BMI (Body Mass Index)   24.53 kg/m           Primary Care Provider Office Phone # Fax #    Betty GAMING " ABDIRAHMAN Ashraf [phone]      Equal Access to Services    [facility]: Hadii john Keyes, elvia saxena, kiahchaparro springersolojohn foss, edwin callejashaimalycia miranda . So [facility] [phone].    ATENCIÓN: Si habla español, tiene a arias disposición servicios gratuitos de asistencia lingüística. Lizzetteame al [phone].    We comply with applicable federal and state civil rights laws, including the [place] Human Rights Act. We do not discriminate on the basis of race, color, creed, Oriental orthodox, national origin, marital status, age, disability, sex, sexual orientation, or gender identity.       Thank you!    Thank you for choosing [facility] for your care. Our goal is always to provide you with excellent care. Hearing back from our patients is one way we can continue to improve our services. Please take a few minutes to complete the written survey that you may receive in the mail after you visit with us. Thank you!            Medication List      ASK your doctor about these medications          Morning Afternoon Evening Bedtime As Needed    atorvastatin 20 MG tablet  Also known as:  LIPITOR  INSTRUCTIONS:  Take 1 tablet (20 mg) by mouth daily                     * cholecalciferol 1000 units (25 mcg) capsule  Also known as:  VITAMIN D3  INSTRUCTIONS:  Take 1 capsule (1,000 Units) by mouth daily                     * vitamin D3 25 MCG (1000 UT) Caps  INSTRUCTIONS:  Take 1,000 Units by mouth                     levothyroxine 200 MCG tablet  Also known as:  SYNTHROID/LEVOTHROID  INSTRUCTIONS:  Take 1 tablet (200 mcg) by mouth daily                     metoprolol succinate  MG 24 hr tablet  Also known as:  TOPROL-XL  INSTRUCTIONS:  Take 1 tablet (100 mg) by mouth daily                     metroNIDAZOLE 0.75 % external gel  Also known as:  METROGEL  INSTRUCTIONS:  Apply topically once                     triamcinolone 0.1 % external cream  Also known as:  KENALOG  INSTRUCTIONS:  Apply  topically 2 times daily                        * This list has 2 medication(s) that are the same as other medications prescribed for you. Read the directions carefully, and ask your doctor or other care provider to review them with you.

## 2020-01-24 NOTE — DISCHARGE INSTRUCTIONS
Home Care after a Loop Recorder Implant  Wound care:  Check for signs of infection each day.  These include increased redness, swelling, drainage or a fever over 101 F (38.3 C).  Call us immediately if you see any of these signs.  You may shower 24 hours after the procedure.  Do not submerge the incision (in a bath tub, hot tub, or swimming pool) until fully healed.  Do not apply powder or lotion to the incision for 14 days.     Pain:   You may have mild pain for 3 to 5 days.  Take acetaminophen (Tylenol) or ibuprofen (Advil) for the pain.  Call us if the pain is severe or lasts more than 5 days.    Activity:  After 24 hours, slowly return to your normal activities.      Avoid anything that may cause rough contact or a hard hit to your chest.  This includes football, hockey, and other contact sports.    Follow Up Visits:  Return to the clinic in 7 to 10 days to have your loop recorder and wound checked.      Telling others about your device:  Before you have any medical tests or treatments, tell the doctors, dentists, and other care providers about your device.  There are a few tests and treatments that may interfere with your device.  Your care team may need to take special steps to keep you safe.  Before you leave the hospital, you will receive a temporary ID card.  A permanent card will be mailed to you about 6 to 8 weeks later.  Always carry the ID card with you.  It has important details about your device.  Safety near electrical equipment:  All of these are safe to use when in good repair -    Microwaves    Radios    Cordless phone    Remote controls    Small electrical tools  Security ferguson: It is okay to walk through security ferguson at the airports and department stores. Tell airport security that you have an implanted loop recorder.  Full-body scanners are safe.  Activator: Black and Gray (pictured below). Carry this with you. Press the button if you have symptoms.            Home Monitor: White and Blue  (pictured below). Plug in at home. Use under device clinic direction.    Questions?  Please call [facility].    General inquiry: [phone]    Device Nurse:          Business Hours:  [phone]                         After Hours:  [phone]   Choose option 4, then ask for the on-call device nurse at job code [phone].    Your next device clinic appointment is scheduled on:     Friday January 31st at 9:30 am.          [facility]  Clinics and Surgery Center - Clinic 3N  [address] [place] [address]  [id number]

## 2020-01-31 ENCOUNTER — ANCILLARY PROCEDURE (OUTPATIENT)
Dept: CARDIOLOGY | Facility: CLINIC | Age: 40
End: 2020-01-31
Payer: COMMERCIAL

## 2020-01-31 DIAGNOSIS — I42.8 ARRHYTHMOGENIC RIGHT VENTRICULAR CARDIOMYOPATHY (H): ICD-10-CM

## 2020-01-31 DIAGNOSIS — Z95.818 STATUS POST PLACEMENT OF IMPLANTABLE LOOP RECORDER: Primary | ICD-10-CM

## 2020-01-31 NOTE — PATIENT INSTRUCTIONS
It was a pleasure to see you in clinic today.  Please do not hesitate to call with any questions or concerns.  You are scheduled for a remote transmission on 5/5/20.      Abner Garcia, RN  Electrophysiology Nurse Clinician  [facility]    During Business Hours Please Call:  [phone]  After Hours Please Call:  [phone] - select option #4 and ask for job code [id number]

## 2020-02-03 LAB
MDC_IDC_MSMT_BATTERY_DTM: NORMAL
MDC_IDC_MSMT_BATTERY_STATUS: NORMAL
MDC_IDC_PG_IMPLANT_DTM: NORMAL
MDC_IDC_PG_MFG: NORMAL
MDC_IDC_PG_MODEL: NORMAL
MDC_IDC_PG_SERIAL: NORMAL
MDC_IDC_PG_TYPE: NORMAL
MDC_IDC_SESS_CLINIC_NAME: NORMAL
MDC_IDC_SESS_DTM: NORMAL
MDC_IDC_SESS_TYPE: NORMAL
MDC_IDC_SET_ZONE_DETECTION_INTERVAL: 2000 MS
MDC_IDC_SET_ZONE_DETECTION_INTERVAL: 3000 MS
MDC_IDC_SET_ZONE_DETECTION_INTERVAL: 320 MS
MDC_IDC_SET_ZONE_TYPE: NORMAL
MDC_IDC_STAT_AT_BURDEN_PERCENT: 0 %
MDC_IDC_STAT_AT_DTM_END: NORMAL
MDC_IDC_STAT_AT_DTM_START: NORMAL
MDC_IDC_STAT_EPISODE_RECENT_COUNT: 0
MDC_IDC_STAT_EPISODE_RECENT_COUNT_DTM_END: NORMAL
MDC_IDC_STAT_EPISODE_RECENT_COUNT_DTM_START: NORMAL
MDC_IDC_STAT_EPISODE_TOTAL_COUNT: 0
MDC_IDC_STAT_EPISODE_TOTAL_COUNT_DTM_END: NORMAL
MDC_IDC_STAT_EPISODE_TOTAL_COUNT_DTM_START: NORMAL
MDC_IDC_STAT_EPISODE_TYPE: NORMAL

## 2020-05-05 ENCOUNTER — ANCILLARY PROCEDURE (OUTPATIENT)
Dept: CARDIOLOGY | Facility: CLINIC | Age: 40
End: 2020-05-05
Attending: INTERNAL MEDICINE
Payer: COMMERCIAL

## 2020-05-05 DIAGNOSIS — I42.8 ARRHYTHMOGENIC RIGHT VENTRICULAR CARDIOMYOPATHY (H): ICD-10-CM

## 2020-05-05 PROCEDURE — G2066 INTER DEVC REMOTE 30D: HCPCS | Mod: ZF

## 2020-05-05 PROCEDURE — 93298 REM INTERROG DEV EVAL SCRMS: CPT | Mod: ZP | Performed by: INTERNAL MEDICINE

## 2020-05-07 LAB
MDC_IDC_EPISODE_DTM: NORMAL
MDC_IDC_EPISODE_ID: 1
MDC_IDC_EPISODE_TYPE: NORMAL
MDC_IDC_MSMT_BATTERY_STATUS: NORMAL
MDC_IDC_PG_IMPLANT_DTM: NORMAL
MDC_IDC_PG_MFG: NORMAL
MDC_IDC_PG_MODEL: NORMAL
MDC_IDC_PG_SERIAL: NORMAL
MDC_IDC_PG_TYPE: NORMAL
MDC_IDC_SESS_CLINIC_NAME: NORMAL
MDC_IDC_SESS_DTM: NORMAL
MDC_IDC_SESS_TYPE: NORMAL
MDC_IDC_SET_ZONE_DETECTION_INTERVAL: 2000 MS
MDC_IDC_SET_ZONE_DETECTION_INTERVAL: 3000 MS
MDC_IDC_SET_ZONE_DETECTION_INTERVAL: 320 MS
MDC_IDC_SET_ZONE_TYPE: NORMAL
MDC_IDC_STAT_AT_BURDEN_PERCENT: 0 %
MDC_IDC_STAT_AT_DTM_END: NORMAL
MDC_IDC_STAT_AT_DTM_START: NORMAL
MDC_IDC_STAT_EPISODE_RECENT_COUNT: 0
MDC_IDC_STAT_EPISODE_RECENT_COUNT: 1
MDC_IDC_STAT_EPISODE_RECENT_COUNT_DTM_END: NORMAL
MDC_IDC_STAT_EPISODE_RECENT_COUNT_DTM_START: NORMAL
MDC_IDC_STAT_EPISODE_TOTAL_COUNT: 0
MDC_IDC_STAT_EPISODE_TOTAL_COUNT: 1
MDC_IDC_STAT_EPISODE_TOTAL_COUNT_DTM_END: NORMAL
MDC_IDC_STAT_EPISODE_TOTAL_COUNT_DTM_START: NORMAL
MDC_IDC_STAT_EPISODE_TYPE: NORMAL

## 2020-05-26 ENCOUNTER — OFFICE VISIT (OUTPATIENT)
Dept: FAMILY MEDICINE | Facility: CLINIC | Age: 40
End: 2020-05-26
Payer: COMMERCIAL

## 2020-05-26 VITALS
HEIGHT: 64 IN | SYSTOLIC BLOOD PRESSURE: 103 MMHG | HEART RATE: 67 BPM | WEIGHT: 159.25 LBS | RESPIRATION RATE: 16 BRPM | TEMPERATURE: 98.2 F | OXYGEN SATURATION: 97 % | BODY MASS INDEX: 27.19 KG/M2 | DIASTOLIC BLOOD PRESSURE: 69 MMHG

## 2020-05-26 DIAGNOSIS — I42.8 ARRHYTHMOGENIC RIGHT VENTRICULAR CARDIOMYOPATHY (H): ICD-10-CM

## 2020-05-26 DIAGNOSIS — E78.5 HYPERLIPIDEMIA LDL GOAL <70: ICD-10-CM
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
## 2020-05-26 DIAGNOSIS — E03.9 ACQUIRED HYPOTHYROIDISM: ICD-10-CM

## 2020-05-26 DIAGNOSIS — L71.9 ROSACEA: ICD-10-CM

## 2020-05-26 LAB
% GRANULOCYTES: 61.6 %G (ref 40–75)
ALBUMIN SERPL-MCNC: 4.2 G/DL (ref 3.4–5)
ALP SERPL-CCNC: 60 U/L (ref 40–150)
ALT SERPL W P-5'-P-CCNC: 26 U/L (ref 0–50)
ANION GAP SERPL CALCULATED.3IONS-SCNC: 5 MMOL/L (ref 3–14)
AST SERPL W P-5'-P-CCNC: 17 U/L (ref 0–45)
BILIRUB SERPL-MCNC: 0.3 MG/DL (ref 0.2–1.3)
BUN SERPL-MCNC: 14 MG/DL (ref 7–30)
CALCIUM SERPL-MCNC: 9.2 MG/DL (ref 8.5–10.1)
CHLORIDE SERPL-SCNC: 106 MMOL/L (ref 94–109)
CHOLEST SERPL-MCNC: 194 MG/DL (ref 0–200)
CHOLEST/HDLC SERPL: 2.7 {RATIO} (ref 0–5)
CO2 SERPL-SCNC: 26 MMOL/L (ref 20–32)
CREAT SERPL-MCNC: 0.82 MG/DL (ref 0.52–1.04)
ERYTHROCYTE [DISTWIDTH] IN BLOOD BY AUTOMATED COUNT: 12.4 %
FASTING SPECIMEN: NO
GFR SERPL CREATININE-BSD FRML MDRD: 89 ML/MIN/{1.73_M2}
GLUCOSE SERPL-MCNC: 86 MG/DL (ref 70–99)
GRANULOCYTES #: 4.9 K/UL (ref 1.6–8.3)
HCT VFR BLD AUTO: 40.8 % (ref 35–47)
HDLC SERPL-MCNC: 71 MG/DL
HEMOGLOBIN: 12.8 G/DL (ref 11.7–15.7)
LDLC SERPL CALC-MCNC: 109 MG/DL (ref 0–129)
LYMPHOCYTES # BLD AUTO: 2.2 K/UL (ref 0.8–5.3)
LYMPHOCYTES NFR BLD AUTO: 28.8 %L (ref 20–48)
MCH RBC QN AUTO: 29.1 PG (ref 26.5–35)
MCHC RBC AUTO-ENTMCNC: 31.4 G/DL (ref 32–36)
MCV RBC AUTO: 92.7 FL (ref 78–100)
MID #: 0.7 K/UL (ref 0–2.2)
MID %: 9.6 %M (ref 0–20)
PLATELET # BLD AUTO: 296 K/UL (ref 150–450)
POTASSIUM SERPL-SCNC: 4.2 MMOL/L (ref 3.4–5.3)
PROT SERPL-MCNC: 7.5 G/DL (ref 6.8–8.8)
RBC # BLD AUTO: 4.4 M/UL (ref 3.8–5.2)
SODIUM SERPL-SCNC: 137 MMOL/L (ref 133–144)
T4 FREE SERPL-MCNC: 1.12 NG/DL (ref 0.76–1.46)
TRIGL SERPL-MCNC: 70 MG/DL (ref 0–150)
TSH SERPL DL<=0.005 MIU/L-ACNC: 8.1 MU/L (ref 0.4–4)
VLDL-CHOLESTEROL: 14 (ref 7–32)
WBC # BLD AUTO: 7.8 K/UL (ref 4–11)

## 2020-05-26 RX ORDER — ATORVASTATIN CALCIUM 20 MG/1
20 TABLET, FILM COATED ORAL DAILY
Qty: 90 TABLET | Refills: 3 | Status: SHIPPED | OUTPATIENT
Start: 2020-05-26 | End: 2021-09-21

## 2020-05-26 RX ORDER — METRONIDAZOLE 7.5 MG/G
GEL TOPICAL ONCE
Qty: 45 G | Refills: 1 | Status: SHIPPED | OUTPATIENT
Start: 2020-05-26 | End: 2020-07-16

## 2020-05-26 RX ORDER — LEVOTHYROXINE SODIUM 200 UG/1
200 TABLET ORAL DAILY
Qty: 90 TABLET | Refills: 3 | Status: SHIPPED | OUTPATIENT
Start: 2020-05-26 | End: 2021-10-05

## 2020-05-26 ASSESSMENT — MIFFLIN-ST. JEOR: SCORE: 1372.6

## 2020-05-26 NOTE — NURSING NOTE
"39 year old  Chief Complaint   Patient presents with     Cat Bite     got bite from her cat this morning, left thumb        Blood pressure 103/69, pulse 67, temperature 98.2  F (36.8  C), temperature source Oral, resp. rate 16, height 1.626 m (5' 4.02\"), weight 72.2 kg (159 lb 4 oz), last menstrual period 2020, SpO2 97 %, not currently breastfeeding. Body mass index is 27.32 kg/m .  Patient Active Problem List   Diagnosis     Cardiomyopathy, familial (H)     Contact dermatitis     Keratosis pilaris     Arrhythmogenic left ventricular dysplasia (H)     Hypothyroidism     Hyperlipidemia LDL goal <130       Wt Readings from Last 2 Encounters:   20 72.2 kg (159 lb 4 oz)   20 64.8 kg (142 lb 15.5 oz)     BP Readings from Last 3 Encounters:   20 103/69   20 99/55   20 102/72         Current Outpatient Medications   Medication     atorvastatin (LIPITOR) 20 MG tablet     Cholecalciferol (VITAMIN D3) 1000 units CAPS     levothyroxine (SYNTHROID/LEVOTHROID) 200 MCG tablet     metoprolol succinate ER (TOPROL-XL) 100 MG 24 hr tablet     metroNIDAZOLE (METROGEL) 0.75 % external gel     triamcinolone (KENALOG) 0.1 % cream     cholecalciferol (VITAMIN  -D) 1000 UNITS capsule     No current facility-administered medications for this visit.        Social History     Tobacco Use     Smoking status: Former Smoker     Years: 2.00     Types: Cigarettes     Last attempt to quit: 4/10/2001     Years since quittin.1     Smokeless tobacco: Never Used   Substance Use Topics     Alcohol use: Yes     Alcohol/week: 2.0 standard drinks     Types: 2 Standard drinks or equivalent per week     Drug use: No       Health Maintenance Due   Topic Date Due     HIV SCREENING  1993     PHQ-2  2020     LIPID  2020     PREVENTIVE CARE VISIT  2020       Lab Results   Component Value Date    PAP ASC-US 2018         May 26, 2020 2:47 PM    "

## 2020-05-26 NOTE — PROGRESS NOTES
Subjective     Maren Bee is a 39 year old female who presents to clinic today for the following health issues:    HPI   Cat bite:   Acute illness concerns: Today she was bit on the left thumb when she tried to break up a fight between her new cat and her old cat.  Both have been fully vaccinated. No redness or pain but it did break the skin and had a puncture.  Tetanus shot is up to date.  Immediately after area was cleansed well with soap and water.    No signs of acute infection such as redness, swelling purulent discharge or streaking.    Able to move thumb normal and no sensory change.    Hyperlipidemia Follow-Up:  On atorvastatin primarily to decrease all cardiac risk factors in light of arrhythmogenic right cardiomyopathy.  Her family members have had significant events related to this diagnosis. She is followed by Dr. Medrano.  Has an implanted loop recorder.  Due for labs and refills.    Are you regularly taking any medication or supplement to lower your cholesterol?   Yes- e    Are you having muscle aches or other side effects that you think could be caused by your cholesterol lowering medication?  No    Hypothyroidism Follow-up    Since last visit, patient describes the following symptoms: Weight stable, no hair loss, no skin changes, no constipation, no loose stools.  Due for labs and refills.    Rosacea:  Known history.  Well controlled with metrogel.  She requests a refill.      Patient Active Problem List   Diagnosis     Cardiomyopathy, familial (H)     Contact dermatitis     Keratosis pilaris     Arrhythmogenic left ventricular dysplasia (H)     Hypothyroidism     Hyperlipidemia LDL goal <130     Arrhythmogenic right ventricular cardiomyopathy (H)     Past Surgical History:   Procedure Laterality Date     DENTAL SURGERY       EP LOOP RECORDER IMPLANT N/A 1/24/2020    Procedure: EP LOOP RECORDER IMPLANT;  Surgeon: Jasbir Gordillo MD;  Location:  HEART CARDIAC CATH LAB     Strabism  Surgery         Social History     Tobacco Use     Smoking status: Former Smoker     Years: 2.00     Types: Cigarettes     Last attempt to quit: 4/10/2001     Years since quittin.1     Smokeless tobacco: Never Used   Substance Use Topics     Alcohol use: Yes     Alcohol/week: 2.0 standard drinks     Types: 2 Standard drinks or equivalent per week     Family History   Problem Relation Age of Onset     Cancer Maternal Grandfather         Bladder     Other - See Comments Mother 58        hyperlipidemia, hypothyroidism, ARVD gene +     Other - See Comments Father         hyperlipidemia, hypothyroidism     Lupus Maternal Grandmother      Other - See Comments Brother 32        (Amari) ARVD s/p sudden cardiac arrest     Cardiovascular Brother 30        (Young) MI at age 30; type 1 DM; ARVD gene negative;      Diabetes Type 1 Brother      Other - See Comments Other         Alcohol Abuse     Diabetes Daughter 3        type I         Current Outpatient Medications   Medication Sig Dispense Refill     amoxicillin-clavulanate (AUGMENTIN) 875-125 MG tablet Take 1 tablet by mouth 2 times daily for 10 days 20 tablet 0     atorvastatin (LIPITOR) 20 MG tablet Take 1 tablet (20 mg) by mouth daily 90 tablet 3     Cholecalciferol (VITAMIN D3) 1000 units CAPS Take 1,000 Units by mouth       levothyroxine (SYNTHROID/LEVOTHROID) 200 MCG tablet Take 1 tablet (200 mcg) by mouth daily 90 tablet 3     metoprolol succinate ER (TOPROL-XL) 100 MG 24 hr tablet Take 1 tablet (100 mg) by mouth daily 90 tablet 3     metroNIDAZOLE (METROGEL) 0.75 % external gel Apply topically once for 1 dose 45 g 1     triamcinolone (KENALOG) 0.1 % cream Apply topically 2 times daily 80 g 1       Reviewed and updated as needed this visit by Provider  Tobacco  Allergies  Meds  Problems  Med Hx  Surg Hx  Fam Hx         Review of Systems   Constitutional, HEENT, cardiovascular, pulmonary, gi and gu systems are negative, except as otherwise noted.     "  Objective    /69 (BP Location: Right arm, Patient Position: Sitting, Cuff Size: Adult Regular)   Pulse 67   Temp 98.2  F (36.8  C) (Oral)   Resp 16   Ht 1.626 m (5' 4.02\")   Wt 72.2 kg (159 lb 4 oz)   LMP 05/24/2020 (Exact Date)   SpO2 97%   BMI 27.32 kg/m    Body mass index is 27.32 kg/m .  Physical Exam   GENERAL: healthy, alert and no distress  NECK: no adenopathy, no asymmetry, masses, or scars and thyroid normal to palpation  RESP: lungs clear to auscultation - no rales, rhonchi or wheezes  CV: regular rate and rhythm, normal S1 S2, no S3 or S4, no murmur, click or rub, no peripheral edema and peripheral pulses strong  MS: no gross musculoskeletal defects noted, no edema  SKIN: Mild facial erythema without papules. Mostly in the nasolabial area and cheeks.  Dermatofibroma on left lower shin.  Left thumb:  Small closed puncture wound on the medial side. Additional wounds noted corresponding with other teeth.  SUMAN  Sensation intact  PSYCH: mentation appears normal, affect normal/bright            Assessment & Plan       ICD-10-CM    1. Cat bite, initial encounter  W55.01XA amoxicillin-clavulanate (AUGMENTIN) 875-125 MG tablet   2. Acquired hypothyroidism  E03.9 TSH with free T4 reflex     levothyroxine (SYNTHROID/LEVOTHROID) 200 MCG tablet   3. Hyperlipidemia LDL goal <70  E78.5 Comprehensive metabolic panel     Lipid Panel ([place])     atorvastatin (LIPITOR) 20 MG tablet   4. Rosacea  L71.9 metroNIDAZOLE (METROGEL) 0.75 % external gel   5. Arrhythmogenic right ventricular cardiomyopathy (H)  I42.8 CBC with Diff Plt ([facility])     Close follow up.  Watch for redness, swelling pain.  See patient instructions.  Consider physical in the next few months.    Return in about 3 months (around 8/26/2020) for Physical Exam. Sooner if any problems or concerns.\  Keep appointment with cardiology.    Betty Ashraf PA-C  [facility]      "

## 2020-05-26 NOTE — PATIENT INSTRUCTIONS
You have been prescribed an antibiotic to treat a bacterial infection. Watch for signs of allergic reaction including swelling around the mouth or eyes and the development of a rash.  If you develop any of these symptoms, stop your medication, take a benadryl (25-50 mg) and give our office a call. Consider probiotic therapy to decrease the possibility of diarrhea associated with antibiotic use.    You have a dermatofibroma--This is a benign skin issue.  It is not a precancerous issue.  Vale Ashraf PA-C    Cat Bite    A cat bite can cause a wound deep enough to break the skin. In such cases, the wound is cleaned and then sometimes closed. If the wound is closed it is usually not closed completely. This is so that fluid can drain if the wound becomes infected. Often the wound is left open to heal. In addition to wound care, a tetanus shot may be given, if needed.  Home care    Wash your hands well with soap and warm water before and after caring for the wound. This helps lower the risk of infection.    Care for the wound as directed. If a dressing was applied to the wound, be sure to change it as directed.    If the wound bleeds, place a clean, soft cloth on the wound. Then firmly apply pressure until the bleeding stops. This may take up to 5 minutes. Don't release the pressure and look at the wound during this time.    Always get medical attention for cat bites on the hand. They are highly likely to become infected.    Most wounds heal within 10 days. But an infection can occur even with proper treatment. So be sure to check the wound daily for signs of infection (see below).    Antibiotics may be prescribed. These help prevent or treat infection. If you re given antibiotics, take them as directed. Also be sure to complete the medicines.  Rabies prevention  Rabies is a virus that can be carried in certain animals. These can include domestic animals such as cats and dogs. Pets fully vaccinated against rabies (2  shots) are at very low risk of infection. But because human rabies is almost always fatal, any biting pet should be confined for 10 days as an extra precaution. In general, if there is a risk for rabies, the following steps may need to be taken:    If someone s pet cat has bitten you, it should be kept in a secure area for the next 10 days to watch for signs of illness. If the pet owner won t allow this, contact your local animal control center. If the cat becomes ill or dies during that time, contact your local animal control center at once so the animal may be tested for rabies. If the cat stays healthy for the next 10 days, there is no danger of rabies in the animal or you.    If a stray cat bit you, contact your local animal control center. They can give information on capture, quarantine, and animal rabies testing.    If you can t find the animal that bit you in the next 2 days, and if rabies exists in your area, you may need to receive the rabies vaccine series. Call your healthcare provider right away. Or return to the emergency department promptly.    All animal bites should be reported to the local animal control center. If you were not given a form to fill out, you can report this yourself.  Follow-up care  Follow up with your healthcare provider, or as directed.  When to seek medical advice  Call your healthcare provider right away if any of these occur:    Signs of infection:  ? Spreading redness or warmth from the wound  ? Increased pain or swelling  ? Fever of 100.4 F (38 C) or higher, or as directed by your healthcare provider  ? Colored fluid or pus draining from the wound  ? Enlarged lymph nodes above the area that was bitten, such as lymph nodes in the armpit if you were bitten on the hand or arm. This may be a sign of cat-scratch disease (cat-scratch fever).    Signs of rabies infection:  ? Headache  ? Confusion  ? Strange behavior  ? Increased salivating or drooling  ? Seizure    Decreased  ability to move any body part near the bite area    Bleeding that can't be stopped after 5 minutes of firm pressure  Date Last Reviewed: 5/1/2018 2000-2019 [employer]. [address] [place] [address], [place] [address]. All rights reserved. This information is not intended as a substitute for professional medical care. Always follow your healthcare professional's instructions.

## 2020-06-30 ENCOUNTER — ANCILLARY PROCEDURE (OUTPATIENT)
Dept: CARDIOLOGY | Facility: CLINIC | Age: 40
End: 2020-06-30
Attending: INTERNAL MEDICINE
Payer: COMMERCIAL

## 2020-06-30 DIAGNOSIS — I42.8 ARRHYTHMOGENIC RIGHT VENTRICULAR CARDIOMYOPATHY (H): ICD-10-CM

## 2020-06-30 DIAGNOSIS — Z95.818 STATUS POST PLACEMENT OF IMPLANTABLE LOOP RECORDER: ICD-10-CM

## 2020-06-30 LAB
ALBUMIN SERPL-MCNC: 4 G/DL (ref 3.4–5)
ALP SERPL-CCNC: 64 U/L (ref 40–150)
ALT SERPL W P-5'-P-CCNC: 20 U/L (ref 0–50)
ANION GAP SERPL CALCULATED.3IONS-SCNC: 2 MMOL/L (ref 3–14)
AST SERPL W P-5'-P-CCNC: 12 U/L (ref 0–45)
BASOPHILS # BLD AUTO: 0 10E9/L (ref 0–0.2)
BASOPHILS NFR BLD AUTO: 0.6 %
BILIRUB SERPL-MCNC: 0.5 MG/DL (ref 0.2–1.3)
BUN SERPL-MCNC: 12 MG/DL (ref 7–30)
CALCIUM SERPL-MCNC: 9 MG/DL (ref 8.5–10.1)
CHLORIDE SERPL-SCNC: 109 MMOL/L (ref 94–109)
CO2 SERPL-SCNC: 27 MMOL/L (ref 20–32)
CREAT SERPL-MCNC: 0.76 MG/DL (ref 0.52–1.04)
DIFFERENTIAL METHOD BLD: ABNORMAL
EOSINOPHIL # BLD AUTO: 0 10E9/L (ref 0–0.7)
EOSINOPHIL NFR BLD AUTO: 0.6 %
ERYTHROCYTE [DISTWIDTH] IN BLOOD BY AUTOMATED COUNT: 12.5 % (ref 10–15)
GFR SERPL CREATININE-BSD FRML MDRD: >90 ML/MIN/{1.73_M2}
GLUCOSE SERPL-MCNC: 96 MG/DL (ref 70–99)
HCT VFR BLD AUTO: 42 % (ref 35–47)
HGB BLD-MCNC: 13.2 G/DL (ref 11.7–15.7)
IMM GRANULOCYTES # BLD: 0 10E9/L (ref 0–0.4)
IMM GRANULOCYTES NFR BLD: 0.6 %
LYMPHOCYTES # BLD AUTO: 1.4 10E9/L (ref 0.8–5.3)
LYMPHOCYTES NFR BLD AUTO: 22.9 %
MCH RBC QN AUTO: 29.6 PG (ref 26.5–33)
MCHC RBC AUTO-ENTMCNC: 31.4 G/DL (ref 31.5–36.5)
MCV RBC AUTO: 94 FL (ref 78–100)
MDC_IDC_MSMT_BATTERY_STATUS: NORMAL
MDC_IDC_PG_IMPLANT_DTM: NORMAL
MDC_IDC_PG_MFG: NORMAL
MDC_IDC_PG_MODEL: NORMAL
MDC_IDC_PG_SERIAL: NORMAL
MDC_IDC_PG_TYPE: NORMAL
MDC_IDC_SESS_CLINIC_NAME: NORMAL
MDC_IDC_SESS_DTM: NORMAL
MDC_IDC_SESS_TYPE: NORMAL
MDC_IDC_SET_ZONE_DETECTION_INTERVAL: 2000 MS
MDC_IDC_SET_ZONE_DETECTION_INTERVAL: 3000 MS
MDC_IDC_SET_ZONE_DETECTION_INTERVAL: 320 MS
MDC_IDC_SET_ZONE_TYPE: NORMAL
MDC_IDC_STAT_AT_BURDEN_PERCENT: 0 %
MDC_IDC_STAT_AT_DTM_END: NORMAL
MDC_IDC_STAT_AT_DTM_START: NORMAL
MDC_IDC_STAT_EPISODE_RECENT_COUNT: 0
MDC_IDC_STAT_EPISODE_RECENT_COUNT_DTM_END: NORMAL
MDC_IDC_STAT_EPISODE_RECENT_COUNT_DTM_START: NORMAL
MDC_IDC_STAT_EPISODE_TOTAL_COUNT: 0
MDC_IDC_STAT_EPISODE_TOTAL_COUNT: 1
MDC_IDC_STAT_EPISODE_TOTAL_COUNT_DTM_END: NORMAL
MDC_IDC_STAT_EPISODE_TOTAL_COUNT_DTM_START: NORMAL
MDC_IDC_STAT_EPISODE_TYPE: NORMAL
MONOCYTES # BLD AUTO: 0.7 10E9/L (ref 0–1.3)
MONOCYTES NFR BLD AUTO: 11.4 %
NEUTROPHILS # BLD AUTO: 4 10E9/L (ref 1.6–8.3)
NEUTROPHILS NFR BLD AUTO: 63.9 %
NRBC # BLD AUTO: 0 10*3/UL
NRBC BLD AUTO-RTO: 0 /100
PLATELET # BLD AUTO: 254 10E9/L (ref 150–450)
POTASSIUM SERPL-SCNC: 4.1 MMOL/L (ref 3.4–5.3)
PROT SERPL-MCNC: 7.8 G/DL (ref 6.8–8.8)
RBC # BLD AUTO: 4.46 10E12/L (ref 3.8–5.2)
SODIUM SERPL-SCNC: 138 MMOL/L (ref 133–144)
WBC # BLD AUTO: 6.3 10E9/L (ref 4–11)

## 2020-06-30 PROCEDURE — 93298 REM INTERROG DEV EVAL SCRMS: CPT | Mod: ZP | Performed by: INTERNAL MEDICINE

## 2020-06-30 PROCEDURE — G2066 INTER DEVC REMOTE 30D: HCPCS | Mod: ZF

## 2020-06-30 PROCEDURE — 85025 COMPLETE CBC W/AUTO DIFF WBC: CPT | Performed by: INTERNAL MEDICINE

## 2020-06-30 PROCEDURE — 80053 COMPREHEN METABOLIC PANEL: CPT | Performed by: INTERNAL MEDICINE

## 2020-06-30 PROCEDURE — 36415 COLL VENOUS BLD VENIPUNCTURE: CPT | Performed by: INTERNAL MEDICINE

## 2020-07-03 ENCOUNTER — VIRTUAL VISIT (OUTPATIENT)
Dept: CARDIOLOGY | Facility: CLINIC | Age: 40
End: 2020-07-03
Attending: INTERNAL MEDICINE
Payer: COMMERCIAL

## 2020-07-03 DIAGNOSIS — I42.8 ARRHYTHMOGENIC RIGHT VENTRICULAR CARDIOMYOPATHY (H): ICD-10-CM

## 2020-07-03 DIAGNOSIS — I42.8 ARRHYTHMOGENIC LEFT VENTRICULAR DYSPLASIA (H): Primary | ICD-10-CM

## 2020-07-03 PROCEDURE — 99214 OFFICE O/P EST MOD 30 MIN: CPT | Mod: GT | Performed by: INTERNAL MEDICINE

## 2020-07-03 ASSESSMENT — PAIN SCALES - GENERAL
PAINLEVEL: NO PAIN (0)
PAINLEVEL: NO PAIN (0)

## 2020-07-03 NOTE — PROGRESS NOTES
"Maren Bee is a 39 year old female who is being evaluated via a billable video visit.      The patient has been notified of following:     \"This video visit will be conducted via a call between you and your physician/provider. We have found that certain health care needs can be provided without the need for an in-person physical exam.  This service lets us provide the care you need with a video conversation.  If a prescription is necessary we can send it directly to your pharmacy.  If lab work is needed we can place an order for that and you can then stop by our lab to have the test done at a later time.    Video visits are billed at different rates depending on your insurance coverage.  Please reach out to your insurance provider with any questions.    If during the course of the call the physician/provider feels a video visit is not appropriate, you will not be charged for this service.\"    Patient has given verbal consent for Video visit? Yes  How would you like to obtain your AVS? [employer]  Patient would like the video invitation sent by: Send to e-mail at: [email or web address] and will be doing the video visit via MyChart connect.  Will anyone else be joining your video visit? No      Medications were reconciled.     Monisha Brooks CMA    2:25 PM    Additional Provider Details:          Clinical Cardiac Electrophysiology    Chief Complaint: arrhythmogenic right ventricular cardiomyopathy     [facility] ([id number]):  I was happy to see Ms. Bee in consultation at the request of Dr. Medrano.  Mrs. Bee is a 35-year-old woman whose brother had an episode of aborted sudden cardiac death.  He was subsequently found to have arrhythmogenic right ventricular cardiomyopathy.  He tested positive for a mutation in the DSP gene.  The patient herself has also tested positive for this mutation.  Recent cardiac MRI (see lab section below) disclosed evidence of the left ventricular variant of arrhythmogenic " cardiomyopathy.  She previously saw Dr. Lancaster, who did discuss risk stratification.  Please see his note as well.  They had discussed the purchase of an AED.        The patient does have a history of syncopal episodes many years ago.  Both of these episodes were felt to represent vasovagal syncope.  She had a Holter monitor placed on 12/15.  I have personally reviewed the recording.  It shows isolated PVCs.  Her PVCs were very infrequent.  She only had 58 PVCs in the entire recording.  There was no sustained or nonsustained ventricular arrhythmia seen.  The patient reported no symptoms.      In questioning today, she reports no further episodes of syncope.  She has had no presyncope.  She does exercise, but at a low to moderate level.  She has been instructed by Dr. Medrano to avoid strenuous or competitive exercise.  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       03Jan2020: I last saw Mrs. Bee in 2016. She returns to discuss and re-review risk stratification with regards to sudden cardiac arrest.  She has been feeling well. She has not had syncope or near syncope. However, she has been considering implantable loop recorder for better monitoring. If the implantable loop recorder disclosed non-sustained VT she would strongly consider ICD placement.    July 26, 2020 Video Visit: She's doing well. No syncope or near syncope.     Current Outpatient Medications   Medication Sig Dispense Refill     atorvastatin (LIPITOR) 20 MG tablet Take 1 tablet (20 mg) by mouth daily 90 tablet 3     Cholecalciferol (VITAMIN D3) 1000 units CAPS Take 1,000 Units by mouth       levothyroxine (SYNTHROID/LEVOTHROID) 200 MCG tablet Take 1 tablet (200 mcg) by mouth daily 90 tablet 3     metoprolol succinate ER (TOPROL-XL) 100 MG 24 hr tablet Take 1 tablet (100 mg) by mouth daily 90 tablet 3     triamcinolone (KENALOG) 0.1 % cream Apply topically 2 times daily  80 g 1     hydrocortisone (ANUSOL-HC) 25 MG suppository Place 1 suppository (25 mg) rectally 2 times daily 24 suppository 1     Pramoxine-HC (HYDROCORTISONE ACE-PRAMOXINE) 2.5-1 % CREA Apply to affected area 2-3 times daily 57 g 1       Past Medical History:   Diagnosis Date     Cardiomyopathy, familial (H) 2012    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      Hypothyroidism        Past Surgical History:   Procedure Laterality Date     DENTAL SURGERY       EP LOOP RECORDER IMPLANT N/A 2020    Procedure: EP LOOP RECORDER IMPLANT;  Surgeon: Jasbir Gordillo MD;  Location:  [facility]     Strabismus Surgery         Family History   Problem Relation Age of Onset     Cancer Maternal Grandfather         Bladder     Other - See Comments Mother 58        hyperlipidemia, hypothyroidism, ARVD gene +     Other - See Comments Father         hyperlipidemia, hypothyroidism     Lupus Maternal Grandmother      Other - See Comments Brother 32        (Amari) ARVD s/p sudden cardiac arrest     Cardiovascular Brother 30        (Young) MI at age 30; type 1 DM; ARVD gene negative;      Diabetes Type 1 Brother      Other - See Comments Other         Alcohol Abuse     Diabetes Daughter 3        type I       Social History     Tobacco Use     Smoking status: Former Smoker     Years: 2.00     Types: Cigarettes     Last attempt to quit: 4/10/2001     Years since quittin.3     Smokeless tobacco: Never Used   Substance Use Topics     Alcohol use: Yes     Alcohol/week: 2.0 standard drinks     Types: 2 Standard drinks or equivalent per week       No Known Allergies    ROS- the ten system review is negative except as noted in the HPI.    Physical Examination:  CONSITUTIONAL: no acute distress  HEENT: no icterus, sclerae white  CV: no visible edema of visualized upper extremities, no gross JVD  CHEST: respirations nonlabored, no audible wheezing  NEURO: AA&Ox3, speech fluent/appropriate, motor  grossly nonfocal  PSYCH: cooperative, affect appropriate  DERM: no rashes on visualized face/neck/upper extremities    The rest of a comprehensive physical examination is deferred due to PHE (public health emergency) video visit restrictions.      Laboratory and diagnostic data reviewed [id number]:    Exam Date  Exam Time  Exam Date  Exam Time  Accession #  Performing Department  Results     20   7:27 AM  20   4:52 PM  [id number]  [facility]     Narrative & Impression     In clinic device appointment converted to remote device appointment to minimize COVID19 exposure for high risk patient populations. Patient is scheduled for a video visit with Dr. Medrano on 7/3/2020. Remote loop recorder transmission received and reviewed. Device transmission sent per MD orders. Patient has a Medtronic loop recorder. Normal loop recorder function. No patient activated episodes recorded. No arrhythmias recorded. Presenting EGM = VS @ 90 bpm. Loop recorder battery status = ok. Patient notified of interrogation results via voicemail. Plan for patient to send a remote transmission in 3 months as scheduled. RHONDA Yost RN         [facility]                                                      CMR Report       MRN:                   [id number]                                  Name:        MATT SILVER                                  :                   1980                                  Scan Date:    2018 07:24:33                                     Signed by Marcin Lizama (uid:9)  11:37:06.     SUMMARY   ==========================================================================================================     Clinical history: 37-year-old female with arrhythmogenic cardiomyopathy and new symptoms of palpitations.  Comparison CMRs: 2012, 06/10/2013, 2014 and 2015     1. The LV is normal in cavity size and wall thickness. The global systolic  function is mildly reduced. The  LVEF is 52%. There are no regional wall motion abnormalities.     2. The RV is normal in cavity size. The global systolic function is normal. The RVEF is 61%. There is no  regional RV akinesia, dyskinesia or dyssynchronous RV contraction. There are no findings to satisfy CMR  criteria for the diagnosis of ARVC as per the 2010 Task Force criteria.     3. Both atria are normal in size.     4. There is no significant valvular disease.      5. Late gadolinium enhancement imaging shows epicardial hyperenhancement of the right ventricular aspect of  the mid inferoseptal segment, mid-myocardial hyperenhancement of the apical septal segment and epicardial  hyperenhancement of the mid anterolateral segment. These match the areas of fatty replacement on other  imaging.     6. Regadenoson perfusion imaging shows no ischemia.     7. There is no pericardial effusion or thickening.     CONCLUSIONS: Arrhythmogenic cardiomyopathy with left ventricular involvement based on tissue  characterization by delayed enhancement imaging, and no right ventricular involvement based on 2010 Task  Force criteria. There have been no changes when compared to the prior studies dating back to 04/05/2012. No  Ischemia.      Assessment and recommendations:    1) Arrhythmogenic cardiomyopathy - LV involvement; FH of sudden cardiac arrest in first-degree relative.  2) S/P implantable loop recorder     I've reviewed her implantable loop recorder interrogation. No arrhythmia detections. She reports no significant palpitations.       Video-Visit Details    Type of service:  Video Visit    Video Start Time: 3:25 PM  Video End Time: 3:38 PM    Originating Location (pt. Location): Home    Distant Location (provider location):  [facility]     Platform used for Video Visit: Adrienne Gordillo MD

## 2020-07-03 NOTE — LETTER
7/3/2020      RE: Maren Bee  [address]  [facility] [address]       Dear Colleague,    Thank you for the opportunity to participate in the care of your patient, Maren Bee, at the [facility]. Please see a copy of my visit note below.    Maren Bee is a 39 year old female who is being evaluated via a billable video visit.        Clinical Cardiac Electrophysiology    Chief Complaint: arrhythmogenic right ventricular cardiomyopathy     HPI ([id number]):  I was happy to see Ms. Bee in consultation at the request of Dr. Medrano.  Mrs. Bee is a 35-year-old woman whose brother had an episode of aborted sudden cardiac death.  He was subsequently found to have arrhythmogenic right ventricular cardiomyopathy.  He tested positive for a mutation in the DSP gene.  The patient herself has also tested positive for this mutation.  Recent cardiac MRI (see lab section below) disclosed evidence of the left ventricular variant of arrhythmogenic cardiomyopathy.  She previously saw Dr. Lancaster, who did discuss risk stratification.  Please see his note as well.  They had discussed the purchase of an AED.        The patient does have a history of syncopal episodes many years ago.  Both of these episodes were felt to represent vasovagal syncope.  She had a Holter monitor placed on 12/15.  I have personally reviewed the recording.  It shows isolated PVCs.  Her PVCs were very infrequent.  She only had 58 PVCs in the entire recording.  There was no sustained or nonsustained ventricular arrhythmia seen.  The patient reported no symptoms.      In questioning today, she reports no further episodes of syncope.  She has had no presyncope.  She does exercise, but at a low to moderate level.  She has been instructed by Dr. Medrano to avoid strenuous or competitive exercise.  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       03Jan2020: I last saw Mrs. Bee in 2016. She returns to discuss and re-review risk stratification with regards to sudden cardiac arrest.  She has been feeling well. She has not had syncope or near syncope. However, she has been considering implantable loop recorder for better monitoring. If the implantable loop recorder disclosed non-sustained VT she would strongly consider ICD placement.    July 26, 2020 Video Visit: She's doing well. No syncope or near syncope.     Current Outpatient Medications   Medication Sig Dispense Refill     atorvastatin (LIPITOR) 20 MG tablet Take 1 tablet (20 mg) by mouth daily 90 tablet 3     Cholecalciferol (VITAMIN D3) 1000 units CAPS Take 1,000 Units by mouth       levothyroxine (SYNTHROID/LEVOTHROID) 200 MCG tablet Take 1 tablet (200 mcg) by mouth daily 90 tablet 3     metoprolol succinate ER (TOPROL-XL) 100 MG 24 hr tablet Take 1 tablet (100 mg) by mouth daily 90 tablet 3     triamcinolone (KENALOG) 0.1 % cream Apply topically 2 times daily 80 g 1     hydrocortisone (ANUSOL-HC) 25 MG suppository Place 1 suppository (25 mg) rectally 2 times daily 24 suppository 1     Pramoxine-HC (HYDROCORTISONE ACE-PRAMOXINE) 2.5-1 % CREA Apply to affected area 2-3 times daily 57 g 1       Past Medical History:   Diagnosis Date     Cardiomyopathy, familial (H) 7/18/2012    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      Hypothyroidism        Past Surgical History:   Procedure Laterality Date     DENTAL SURGERY       EP LOOP RECORDER IMPLANT N/A 1/24/2020    Procedure: EP LOOP RECORDER IMPLANT;  Surgeon: Jasbir Gordillo MD;  Location:  [facility]     Strabismus Surgery         Family History   Problem Relation Age of Onset     Cancer Maternal Grandfather         Bladder     Other - See Comments Mother 58        hyperlipidemia, hypothyroidism, ARVD gene +     Other - See Comments Father          hyperlipidemia, hypothyroidism     Lupus Maternal Grandmother      Other - See Comments Brother 32        (Amari) ARVD s/p sudden cardiac arrest     Cardiovascular Brother 30        (Young) MI at age 30; type 1 DM; ARVD gene negative;      Diabetes Type 1 Brother      Other - See Comments Other         Alcohol Abuse     Diabetes Daughter 3        type I       Social History     Tobacco Use     Smoking status: Former Smoker     Years: 2.00     Types: Cigarettes     Last attempt to quit: 4/10/2001     Years since quittin.3     Smokeless tobacco: Never Used   Substance Use Topics     Alcohol use: Yes     Alcohol/week: 2.0 standard drinks     Types: 2 Standard drinks or equivalent per week       No Known Allergies    ROS- the ten system review is negative except as noted in the HPI.    Physical Examination:  CONSITUTIONAL: no acute distress  HEENT: no icterus, sclerae white  CV: no visible edema of visualized upper extremities, no gross JVD  CHEST: respirations nonlabored, no audible wheezing  NEURO: AA&Ox3, speech fluent/appropriate, motor grossly nonfocal  PSYCH: cooperative, affect appropriate  DERM: no rashes on visualized face/neck/upper extremities    The rest of a comprehensive physical examination is deferred due to PHE (public health emergency) video visit restrictions.      Laboratory and diagnostic data reviewed [id number]:    Exam Date  Exam Time  Exam Date  Exam Time  Accession #  Performing Department  Results     20   7:27 AM  20   4:52 PM  [id number]  [facility]     Narrative & Impression     In clinic device appointment converted to remote device appointment to minimize COVID19 exposure for high risk patient populations. Patient is scheduled for a video visit with Dr. Medrano on 7/3/2020. Remote loop recorder transmission received and reviewed. Device transmission sent per MD orders. Patient has a Medtronic loop recorder. Normal loop recorder function. No patient activated  episodes recorded. No arrhythmias recorded. Presenting EGM = VS @ 90 bpm. Loop recorder battery status = ok. Patient notified of interrogation results via voicemail. Plan for patient to send a remote transmission in 3 months as scheduled. RHONDA Yost RN         [facility]                                                      CMR Report       MRN:                   [id number]                                  Name:        MATT SILVER                                  :                   1980                                  Scan Date:    2018 07:24:33                                     Signed by Marcin Lizama (uid:9)  11:37:06.     SUMMARY   ==========================================================================================================     Clinical history: 37-year-old female with arrhythmogenic cardiomyopathy and new symptoms of palpitations.  Comparison CMRs: 2012, 06/10/2013, 2014 and 2015     1. The LV is normal in cavity size and wall thickness. The global systolic function is mildly reduced. The  LVEF is 52%. There are no regional wall motion abnormalities.     2. The RV is normal in cavity size. The global systolic function is normal. The RVEF is 61%. There is no  regional RV akinesia, dyskinesia or dyssynchronous RV contraction. There are no findings to satisfy CMR  criteria for the diagnosis of ARVC as per the 2010 Task Force criteria.     3. Both atria are normal in size.     4. There is no significant valvular disease.      5. Late gadolinium enhancement imaging shows epicardial hyperenhancement of the right ventricular aspect of  the mid inferoseptal segment, mid-myocardial hyperenhancement of the apical septal segment and epicardial  hyperenhancement of the mid anterolateral segment. These match the areas of fatty replacement on other  imaging.     6. Regadenoson perfusion imaging shows no ischemia.     7. There is no pericardial  effusion or thickening.     CONCLUSIONS: Arrhythmogenic cardiomyopathy with left ventricular involvement based on tissue  characterization by delayed enhancement imaging, and no right ventricular involvement based on 2010 Task  Force criteria. There have been no changes when compared to the prior studies dating back to 04/05/2012. No  Ischemia.      Assessment and recommendations:    1) Arrhythmogenic cardiomyopathy - LV involvement; FH of sudden cardiac arrest in first-degree relative.  2) S/P implantable loop recorder     I've reviewed her implantable loop recorder interrogation. No arrhythmia detections. She reports no significant palpitations.       Please do not hesitate to contact me if you have any questions/concerns.     Sincerely,     [facility]

## 2020-07-03 NOTE — LETTER
7/3/2020      RE: Maren Bee  [address]  [facility] [address]       Dear Colleague,    Thank you for the opportunity to participate in the care of your patient, Maren Bee, at the [facility] at [facility]. Please see a copy of my visit note below.    Maren Bee is a 39 year old female who is being evaluated via a billable video visit.      HPI:    40 yo female with arrhythmogenic cardiomyopathy with (frameshift [id number]) presents for ongoing evaluation and management.   Overall patient reports that she has been feeling well.  She denies any chest pain or pressure, sob/iniguez, orthopnea, pnd, palpitations, syncope/presyncope or ozzy.    CARDIAC HISTORY: Pt's brother suffered sudden cardiac death in 2012 while playing basketball - he was successfully resuscitated with AED. 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  After patient was found to be mutation positive she underwent cardiac MRI in 2012 which did not reveal any significant findings for right sided involvement of ARVC.  Holter was also unremarkable.  Later patient's mother had MRI and also found to have left-sided (septal) involvement of ARVC.  Given this in addition to carefully examining cardiac MRI from 2015 for left sided involvement of ARVC, past MRI was also reexamined and patient was noted to also have left-sided involvement of ARVC.     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      Hypothyroidism    Hyperlipidemia         PAST SURGICAL HISTORY:  Past Surgical History:   Procedure Laterality Date     DENTAL SURGERY       EP LOOP RECORDER IMPLANT N/A 1/24/2020    Procedure: EP LOOP RECORDER IMPLANT;  Surgeon: Jasbir Gordillo MD;  Location:  HEART CARDIAC CATH LAB      Strabismus Surgery         ALLERGIES   No Known Allergies    FAMILY HISTORY:  Family History   Problem Relation Age of Onset     Cancer Maternal Grandfather         Bladder     Other - See Comments Mother 58        hyperlipidemia, hypothyroidism, ARVD gene +     Other - See Comments Father         hyperlipidemia, hypothyroidism     Lupus Maternal Grandmother      Other - See Comments Brother 32        (Amari) ARVD s/p sudden cardiac arrest     Cardiovascular Brother 30        (Young) MI at age 30; type 1 DM; ARVD gene negative;      Diabetes Type 1 Brother      Other - See Comments Other         Alcohol Abuse     Diabetes Daughter 3        type I       EXAM:  There were no vitals taken for this visit.  In general, the patient is a pleasant female in no apparent distress.    HEENT: NC/AT.    Sclerae white, not injected.  Nares clear.  Pharynx without erythema or exudate.  Dentition intact.    Neck:  No jugular venous distension.   Heart: RRR. Normal S1, S2 splits physiologically. No murmur, rub, click, or gallop. The PMI is in the 5th ICS in the midclavicular line. There is no heave.    Lungs: CTA.  No ronchi, wheezes, rales.  No dullness to percussion.   Abdomen: Soft, nontender, nondistended.   Extremities: No clubbing, cyanosis, or edema.      Labs:  LIPID RESULTS:  Lab Results   Component Value Date    CHOL 194.0 05/26/2020    HDL 71.0 05/26/2020    .0 05/26/2020    LDL 74 07/30/2013    TRIG 70.0 05/26/2020    CHOLHDLRATIO 2.7 05/26/2020     CBC RESULTS:  Lab Results   Component Value Date    WBC 6.3 06/30/2020    RBC 4.46 06/30/2020    HGB 13.2 06/30/2020    HCT 42.0 06/30/2020    MCV 94 06/30/2020    MCH 29.6 06/30/2020    MCHC 31.4 (L) 06/30/2020    RDW 12.5 06/30/2020     06/30/2020       BMP RESULTS:  Lab Results   Component Value Date     06/30/2020    POTASSIUM 4.1 06/30/2020    CHLORIDE 109 06/30/2020    CO2 27 06/30/2020    ANIONGAP 2 (L) 06/30/2020    GLC 96 06/30/2020    BUN 12  06/30/2020    CR 0.76 06/30/2020    GFRESTIMATED >90 06/30/2020    GFRESTBLACK >90 06/30/2020    ADINA 9.0 06/30/2020        Procedures:    Holter June 2013:  No significant arrythmias    Cardiac MRI 6/2013: Impressions:  1. Normal left-ventricular size and global function (LVEF 57%).   There were no regional wall- motion abnormalities.   2. Normal right-ventricular size with [normal right-ventricular systolic function (RVEF 55%). There are no major or minor morphological criteria for ARVC (Per the revised criteria (2010).   3. Normal resting perfusion study. There was no late gadolinium enhancement to suggest prior infarct, inflammation, or infiltration.   4. Sub optimal quality of study due to susceptibility and RF artifacts   5. Compared to cardiac MRI dated 04/2012 there appears no change in RV/LV function.  CARDIAC MRI: 7/1/14   IMPRESSION:   1. Normal left ventricular size and systolic function with a calculated ejection fraction of 55 %.   2. Normal right ventricular size and systolic function with a calculated ejection fraction of 56%. There are no major or minor morphological criteria for ARVC (Per the revised criteria (2010).   3. On delayed enhancement imaging, there is no abnormal hyperenhancement to suggest myocardial scar/inflammation/infiltration In comparison with prior study dated 6/10/2013, no significant changes are noted.  Examination: MR CARDIAC W CONTRAST   Date: 12/4/2015 2:30 PM  Indication: 35-year-old female with family history of ARVC  Comparison: Cardiac MRIs of 07/01/2014, 06/10/2013 and 04/05/2012  IMPRESSION:  Arrhythmogenic cardiomyopathy with left ventricular involvement based  on tissue characterization by delayed enhancement imaging, and no  right ventricular involvement based on 2010 Task Force criteria.  Review of prior cardiac MRIs shows the same areas of hyperenhancement  dating back to 04/05/2012 with no obvious changes in the interim period.    EKG 12/15/15:  Normal sinus  rhyhtm. Sinus arrhythmia.  Unchanged from prior.    Holter 12/2015      Zio-monitor 6/15/18        CMR Report 06-                              Clinical history: 37-year-old female with arrhythmogenic cardiomyopathy and new symptoms of palpitations.  Comparison CMRs: 04/05/2012, 06/10/2013, 07/01/2014 and 12/04/2015  1. The LV is normal in cavity size and wall thickness. The global systolic function is mildly reduced. The  LVEF is 52%. There are no regional wall motion abnormalities.  2. The RV is normal in cavity size. The global systolic function is normal. The RVEF is 61%. There is no  regional RV akinesia, dyskinesia or dyssynchronous RV contraction. There are no findings to satisfy CMR  criteria for the diagnosis of ARVC as per the 2010 Task Force criteria.  3. Both atria are normal in size.  4. There is no significant valvular disease.   5. Late gadolinium enhancement imaging shows epicardial hyperenhancement of the right ventricular aspect of  the mid inferoseptal segment, mid-myocardial hyperenhancement of the apical septal segment and epicardial  hyperenhancement of the mid anterolateral segment. These match the areas of fatty replacement on other  imaging.  6. Regadenoson perfusion imaging shows no ischemia.  7. There is no pericardial effusion or thickening.  CONCLUSIONS: Arrhythmogenic cardiomyopathy with left ventricular involvement based on tissue  characterization by delayed enhancement imaging, and no right ventricular involvement based on 2010 Task  Force criteria. There have been no changes when compared to the prior studies dating back to 04/05/2012. No  Ischemia.    Cardiopulmonary Stress Test (7/2018):               Zio-monitor 6/3/2019: (2 days 11 hours)      Assessment and Plan:    38 yo female with arrhythmogenic cardiomyopathy (frameshift [id number]) presents for ongoing evaluation and management.    # Arrhythmogenic cardiomyopathy (frameshift [id number]):   At last visit, ordered CPX,  and repeat Zio patch. We also initiated Toprol XL and titrated to 50 mg daily which she is tolerating well. Pt remains asymptomatic. EKG's unremarkable.  Recent Zio also revealed no significant arrhythmias. Last MRI in 2018 confirms no progression. Have had extensive discussions with patient about pathophysiology of ARVC. Reviewed with patient ACC/AHA 2004 guidelines on exercise safety with ARVC. Also discussed with patient the need to avoid all stimulants including Sudafed and all ephedrine containing products as well as methamphetamines and cocaine.  Pt again reminded that all first degree relatives should be screened for ARVC with cardiac MRI, EKG and Holter.  If family members decide to pursue genetic testing, then clinical screening would be indicated in gene positive, not gene negative, individuals.     She has previously seen Dr. Gordillo regarding possible ICD implantation especially with SCD event in first degree family member. It would be our recommendation for implantable loop recorder to help further risk stratify. Would also gently increase Toprol XL as tolerated.     Plan: recommend implantable loop recorder, HR test with two flights of stairs, increase Toprol XL to 75 mg daily for one month and then to 100 mg if tolerating      Haylie Medrano MD  Section Head - Advanced Heart Failure, Transplantation and Mechanical Circulatory Support  Director - Adult Congenital and Cardiovascular Genetics Center  Associate Professor of Medicine, [facility]      Please do not hesitate to contact me if you have any questions/concerns.     Sincerely,     Haylie Medrano MD

## 2020-07-03 NOTE — PROGRESS NOTES
"Maren Bee is a 39 year old female who is being evaluated via a billable video visit.      The patient has been notified of following:     \"This video visit will be conducted via a call between you and your physician/provider. We have found that certain health care needs can be provided without the need for an in-person physical exam.  This service lets us provide the care you need with a video conversation.  If a prescription is necessary we can send it directly to your pharmacy.  If lab work is needed we can place an order for that and you can then stop by our lab to have the test done at a later time.    Video visits are billed at different rates depending on your insurance coverage.  Please reach out to your insurance provider with any questions.    If during the course of the call the physician/provider feels a video visit is not appropriate, you will not be charged for this service.\"    Patient has given verbal consent for Video visit? Yes  How would you like to obtain your AVS? NoFlo  Patient would like the video invitation sent by: Send to e-mail at: [email or web address] and will be doing the video visit via MyChart connect.  Will anyone else be joining your video visit? No      Medications were reconciled.     Monisha MOLLY Brooks    2:17 PM      HPI:    38 yo female with arrhythmogenic cardiomyopathy with (frameshift [id number]) presents for ongoing evaluation and management.   Overall patient reports that she has been feeling well.  She denies any chest pain or pressure, sob/iniguez, orthopnea, pnd, palpitations, syncope/presyncope or ozzy.    CARDIAC HISTORY: Pt's brother suffered sudden cardiac death in 2012 while playing basketball - he was successfully resuscitated with AED. 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  After patient was found to be mutation positive she underwent cardiac MRI in 2012 which did not reveal any " significant findings for right sided involvement of ARVC.  Holter was also unremarkable.  Later patient's mother had MRI and also found to have left-sided (septal) involvement of ARVC.  Given this in addition to carefully examining cardiac MRI from 2015 for left sided involvement of ARVC, past MRI was also reexamined and patient was noted to also have left-sided involvement of ARVC.     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      Hypothyroidism    Hyperlipidemia         PAST SURGICAL HISTORY:  Past Surgical History:   Procedure Laterality Date     DENTAL SURGERY       EP LOOP RECORDER IMPLANT N/A 1/24/2020    Procedure: EP LOOP RECORDER IMPLANT;  Surgeon: Jasbir Gordillo MD;  Location:  HEART CARDIAC CATH LAB     Strabismus Surgery         ALLERGIES   No Known Allergies    FAMILY HISTORY:  Family History   Problem Relation Age of Onset     Cancer Maternal Grandfather         Bladder     Other - See Comments Mother 58        hyperlipidemia, hypothyroidism, ARVD gene +     Other - See Comments Father         hyperlipidemia, hypothyroidism     Lupus Maternal Grandmother      Other - See Comments Brother 32        (Amari) ARVD s/p sudden cardiac arrest     Cardiovascular Brother 30        (Young) MI at age 30; type 1 DM; ARVD gene negative;      Diabetes Type 1 Brother      Other - See Comments Other         Alcohol Abuse     Diabetes Daughter 3        type I       EXAM:  There were no vitals taken for this visit.  In general, the patient is a pleasant female in no apparent distress.    HEENT: NC/AT.    Sclerae white, not injected.  Nares clear.  Pharynx without erythema or exudate.  Dentition intact.    Neck:  No jugular venous distension.   Heart: RRR. Normal S1, S2 splits physiologically. No murmur, rub, click, or gallop. The PMI is in the 5th ICS in the midclavicular line. There is no  heave.    Lungs: CTA.  No ronchi, wheezes, rales.  No dullness to percussion.   Abdomen: Soft, nontender, nondistended.   Extremities: No clubbing, cyanosis, or edema.      Labs:  LIPID RESULTS:  Lab Results   Component Value Date    CHOL 194.0 05/26/2020    HDL 71.0 05/26/2020    .0 05/26/2020    LDL 74 07/30/2013    TRIG 70.0 05/26/2020    CHOLHDLRATIO 2.7 05/26/2020     CBC RESULTS:  Lab Results   Component Value Date    WBC 6.3 06/30/2020    RBC 4.46 06/30/2020    HGB 13.2 06/30/2020    HCT 42.0 06/30/2020    MCV 94 06/30/2020    MCH 29.6 06/30/2020    MCHC 31.4 (L) 06/30/2020    RDW 12.5 06/30/2020     06/30/2020       BMP RESULTS:  Lab Results   Component Value Date     06/30/2020    POTASSIUM 4.1 06/30/2020    CHLORIDE 109 06/30/2020    CO2 27 06/30/2020    ANIONGAP 2 (L) 06/30/2020    GLC 96 06/30/2020    BUN 12 06/30/2020    CR 0.76 06/30/2020    GFRESTIMATED >90 06/30/2020    GFRESTBLACK >90 06/30/2020    ADINA 9.0 06/30/2020        Procedures:    Holter June 2013:  No significant arrythmias    Cardiac MRI 6/2013: Impressions:  1. Normal left-ventricular size and global function (LVEF 57%).   There were no regional wall- motion abnormalities.   2. Normal right-ventricular size with [normal right-ventricular systolic function (RVEF 55%). There are no major or minor morphological criteria for ARVC (Per the revised criteria (2010).   3. Normal resting perfusion study. There was no late gadolinium enhancement to suggest prior infarct, inflammation, or infiltration.   4. Sub optimal quality of study due to susceptibility and RF artifacts   5. Compared to cardiac MRI dated 04/2012 there appears no change in RV/LV function.  CARDIAC MRI: 7/1/14   IMPRESSION:   1. Normal left ventricular size and systolic function with a calculated ejection fraction of 55 %.   2. Normal right ventricular size and systolic function with a calculated ejection fraction of 56%. There are no major or minor  morphological criteria for ARVC (Per the revised criteria (2010).   3. On delayed enhancement imaging, there is no abnormal hyperenhancement to suggest myocardial scar/inflammation/infiltration In comparison with prior study dated 6/10/2013, no significant changes are noted.  Examination: MR CARDIAC W CONTRAST   Date: 12/4/2015 2:30 PM  Indication: 35-year-old female with family history of ARVC  Comparison: Cardiac MRIs of 07/01/2014, 06/10/2013 and 04/05/2012  IMPRESSION:  Arrhythmogenic cardiomyopathy with left ventricular involvement based  on tissue characterization by delayed enhancement imaging, and no  right ventricular involvement based on 2010 Task Force criteria.  Review of prior cardiac MRIs shows the same areas of hyperenhancement  dating back to 04/05/2012 with no obvious changes in the interim period.    EKG 12/15/15:  Normal sinus rhyhtm. Sinus arrhythmia.  Unchanged from prior.    Holter 12/2015      Zio-monitor 6/15/18        CMR Report 06-                              Clinical history: 37-year-old female with arrhythmogenic cardiomyopathy and new symptoms of palpitations.  Comparison CMRs: 04/05/2012, 06/10/2013, 07/01/2014 and 12/04/2015  1. The LV is normal in cavity size and wall thickness. The global systolic function is mildly reduced. The  LVEF is 52%. There are no regional wall motion abnormalities.  2. The RV is normal in cavity size. The global systolic function is normal. The RVEF is 61%. There is no  regional RV akinesia, dyskinesia or dyssynchronous RV contraction. There are no findings to satisfy CMR  criteria for the diagnosis of ARVC as per the 2010 Task Force criteria.  3. Both atria are normal in size.  4. There is no significant valvular disease.   5. Late gadolinium enhancement imaging shows epicardial hyperenhancement of the right ventricular aspect of  the mid inferoseptal segment, mid-myocardial hyperenhancement of the apical septal segment and  epicardial  hyperenhancement of the mid anterolateral segment. These match the areas of fatty replacement on other  imaging.  6. Regadenoson perfusion imaging shows no ischemia.  7. There is no pericardial effusion or thickening.  CONCLUSIONS: Arrhythmogenic cardiomyopathy with left ventricular involvement based on tissue  characterization by delayed enhancement imaging, and no right ventricular involvement based on 2010 Task  Force criteria. There have been no changes when compared to the prior studies dating back to 04/05/2012. No  Ischemia.    Cardiopulmonary Stress Test (7/2018):               Zio-monitor 6/3/2019: (2 days 11 hours)      Assessment and Plan:    40 yo female with arrhythmogenic cardiomyopathy (frameshift [id number]) presents for ongoing evaluation and management.    # Arrhythmogenic cardiomyopathy (frameshift [id number]):   At last visit, ordered CPX, and repeat Zio patch. We also initiated Toprol XL and titrated to 50 mg daily which she is tolerating well. Pt remains asymptomatic. EKG's unremarkable.  Recent Zio also revealed no significant arrhythmias. Last MRI in 2018 confirms no progression. Have had extensive discussions with patient about pathophysiology of ARVC. Reviewed with patient ACC/AHA 2004 guidelines on exercise safety with ARVC. Also discussed with patient the need to avoid all stimulants including Sudafed and all ephedrine containing products as well as methamphetamines and cocaine.  Pt again reminded that all first degree relatives should be screened for ARVC with cardiac MRI, EKG and Holter.  If family members decide to pursue genetic testing, then clinical screening would be indicated in gene positive, not gene negative, individuals.     She has previously seen Dr. Gordillo regarding possible ICD implantation especially with SCD event in first degree family member. It would be our recommendation for implantable loop recorder to help further risk stratify. Would also gently  increase Toprol XL as tolerated.     Plan: recommend implantable loop recorder, HR test with two flights of stairs, increase Toprol XL to 75 mg daily for one month and then to 100 mg if tolerating      Haylie Medrano MD  Section Head - Advanced Heart Failure, Transplantation and Mechanical Circulatory Support  Director - Adult Congenital and Cardiovascular Genetics Center  Associate Professor of Medicine, [facility]

## 2020-07-04 NOTE — PATIENT INSTRUCTIONS
You were seen today in the Adult Congenital and Cardiovascular Genetics Clinic at the [facility].    Cardiology Providers you saw during your visit:  Haylie Medrano MD and Jasbir Gordillo MD    Diagnosis:  ARVC    Results:  Haylie Medrano MD and Jasbir Gordillo MD reviewed the results of your labs and device testing today in clinic.    Recommendations:    1. Continue to eat a heart healthy, low salt diet.  2. Continue to get 20-30 minutes of aerobic activity, 4-5 days per week.  Examples of aerobic activity include walking, running, swimming, cycling, etc.  3. Continue to observe good oral hygiene, with regular dental visits.  4. No changes today.    SBE prophylaxis:   Yes____  No_X___    Lifelong Bacterial Endocarditis Prophylaxis:  YES____  NO____    If YES is checked, follow the recommendations outlined below:  1. Take antibiotic(s) prior to recommended dental procedures and procedures on the respiratory tract or with infected skin, muscle or bones. SBE prophylaxis is not needed for routine GI and  procedures (ie. Colonoscopy or vaginal delivery)  2. Observe good oral hygiene daily, as advised by your dentist. Get regular professional dental care.  3. Keep cuts clean.  4. Infections should be treated promptly.  5. Symptoms of Infective Endocarditis could include: fever lasting more than 4-5 days or a recurrent fever that initially resolves but returns within 1-2 days)      Exercise restrictions:   Yes__X__  No____         If yes, list restrictions:  Must be allowed to rest if fatigued or SOB      Work restrictions:  Yes____  No_X___         If yes, list restrictions:    FASTING CHOLESTEROL was checked in the last 5 years YES_X__  NO___ (2019)  Continue to eat a heart healthy, low salt diet.         ____ Fasting lipid panel order today         ____ No changes in medications          ____ I recommend the following changes in your cholesterol medications.:          ____ Please follow up for cholesterol  screening at your primary care physician      Follow-up:  Follow up with Dr. Medrano and Dr. Gordillo in one year with labs, device, 2 flight stair test, and EKG prior.    If you have questions or concerns please contact us at:    Monisha Estrada, MSN, RN, CNL    Christine Torres (Scheduling)  Nurse Care Coordinator     Clinic   Adult Congenital and CV Genetics   Adult Congenital and CV Genetic  [facility]  (P) [phone]     (P) [phone]  [email or web address]   (F) [phone]        For after hours urgent needs, call [phone] and ask to speak to the Adult Congenital Physician on call.  Mention Job Code 0401.    For emergencies call 911.    [facility]   Clinics and Surgery Center  Mail Code [id number]  [address] [place] [address]  [id number]

## 2020-07-04 NOTE — NURSING NOTE
Diet: Patient instructed regarding a heart healthy diet, including discussion of reduced fat and sodium intake. Patient demonstrated understanding of this information and agreed to call with further questions or concerns.    Labs: Patient was given results of the laboratory testing obtained today. Patient was instructed to return for the next laboratory testing in one year. Patient demonstrated understanding of this information and agreed to call with further questions or concerns.     Med Reconcile: Reviewed and verified all current medications with the patient. The updated medication list was printed and given to the patient.    Return Appointment: Patient given instructions regarding scheduling next clinic visit. Patient demonstrated understanding of this information and agreed to call with further questions or concerns.    Patient stated she understood all health information given and agreed to call with further questions or concerns.    Monisha Estrada, MSN, RN, CNL  Cardiology Care Coordinator  [facility]  [phone]

## 2020-07-04 NOTE — NURSING NOTE
Diet: Patient instructed regarding a heart healthy diet, including discussion of reduced fat and sodium intake. Patient demonstrated understanding of this information and agreed to call with further questions or concerns.    Labs: Patient was given results of the laboratory testing obtained today. Patient was instructed to return for the next laboratory testing in one year. Patient demonstrated understanding of this information and agreed to call with further questions or concerns.     Med Reconcile: Reviewed and verified all current medications with the patient. The updated medication list was printed and given to the patient.    Return Appointment: Patient given instructions regarding scheduling next clinic visit. Patient demonstrated understanding of this information and agreed to call with further questions or concerns.    Patient stated she understood all health information given and agreed to call with further questions or concerns.    Monisha Estrada, MSN, RN, CNL  Cardiology Care Coordinator  [facility]  [phone]

## 2020-07-04 NOTE — PATIENT INSTRUCTIONS
You were seen today in the Adult Congenital and Cardiovascular Genetics Clinic at the [facility].    Cardiology Providers you saw during your visit:  Haylie Medrano MD and Jasbir Gordillo MD    Diagnosis:  ARVC    Results:  Haylie Medrano MD and Jasbir Gordillo MD reviewed the results of your labs and device testing today in clinic.    Recommendations:    1. Continue to eat a heart healthy, low salt diet.  2. Continue to get 20-30 minutes of aerobic activity, 4-5 days per week.  Examples of aerobic activity include walking, running, swimming, cycling, etc.  3. Continue to observe good oral hygiene, with regular dental visits.  4. No changes today.    SBE prophylaxis:   Yes____  No_X___    Lifelong Bacterial Endocarditis Prophylaxis:  YES____  NO____    If YES is checked, follow the recommendations outlined below:  1. Take antibiotic(s) prior to recommended dental procedures and procedures on the respiratory tract or with infected skin, muscle or bones. SBE prophylaxis is not needed for routine GI and  procedures (ie. Colonoscopy or vaginal delivery)  2. Observe good oral hygiene daily, as advised by your dentist. Get regular professional dental care.  3. Keep cuts clean.  4. Infections should be treated promptly.  5. Symptoms of Infective Endocarditis could include: fever lasting more than 4-5 days or a recurrent fever that initially resolves but returns within 1-2 days)      Exercise restrictions:   Yes__X__  No____         If yes, list restrictions:  Must be allowed to rest if fatigued or SOB      Work restrictions:  Yes____  No_X___         If yes, list restrictions:    FASTING CHOLESTEROL was checked in the last 5 years YES_X__  NO___ (2019)  Continue to eat a heart healthy, low salt diet.         ____ Fasting lipid panel order today         ____ No changes in medications          ____ I recommend the following changes in your cholesterol medications.:          ____ Please follow up for cholesterol  screening at your primary care physician      Follow-up:  Follow up with Dr. Medrano and Dr. Gordillo in one year with labs, device, 2 flight stair test, and EKG prior.    If you have questions or concerns please contact us at:    Monisha Estrada, MSN, RN, CNL    Christine Torres (Scheduling)  Nurse Care Coordinator     Clinic   Adult Congenital and CV Genetics   Adult Congenital and CV Genetic  [facility]  (P) [phone]     (P) [phone]  [email or web address]   (F) [phone]        For after hours urgent needs, call [phone] and ask to speak to the Adult Congenital Physician on call.  Mention Job Code 0401.    For emergencies call 911.    [facility]   [facility]  Mail Code [id number]  [address] [place] [address]  [id number]

## 2020-07-16 ENCOUNTER — OFFICE VISIT (OUTPATIENT)
Dept: FAMILY MEDICINE | Facility: CLINIC | Age: 40
End: 2020-07-16
Payer: COMMERCIAL

## 2020-07-16 VITALS
HEART RATE: 83 BPM | BODY MASS INDEX: 28.48 KG/M2 | WEIGHT: 154.75 LBS | TEMPERATURE: 97.5 F | SYSTOLIC BLOOD PRESSURE: 80 MMHG | DIASTOLIC BLOOD PRESSURE: 49 MMHG | HEIGHT: 62 IN | OXYGEN SATURATION: 98 %

## 2020-07-16 DIAGNOSIS — K64.5 THROMBOSED EXTERNAL HEMORRHOIDS: Primary | ICD-10-CM

## 2020-07-16 RX ORDER — HYDROCORTISONE ACETATE, PRAMOXINE HCL 2.5; 1 G/100G; G/100G
CREAM TOPICAL
Qty: 57 G | Refills: 1 | Status: SHIPPED | OUTPATIENT
Start: 2020-07-16 | End: 2022-01-07

## 2020-07-16 RX ORDER — HYDROCORTISONE ACETATE 25 MG/1
25 SUPPOSITORY RECTAL 2 TIMES DAILY
Qty: 24 SUPPOSITORY | Refills: 1 | Status: SHIPPED | OUTPATIENT
Start: 2020-07-16 | End: 2021-10-05

## 2020-07-16 ASSESSMENT — MIFFLIN-ST. JEOR: SCORE: 1319.16

## 2020-07-16 NOTE — PROGRESS NOTES
Subjective     Maren Bee is a 40 year old female who presents to clinic today for the following health issues:      Bright red rectal bleeding that lasted for 3 weeks. Initially it was a significant amount with leaking into the toilet and the just with wiping following a bowel movement.    Resolved on its own.  She has not had bleeding for a week but she describes pressure, swelling and itching.  No inciting activity other than decreased activity and increased sitting as she is working at home.. Loose stools in the past couple days but she was constipated when this began  Witch hazel pads were helpful.    Description:   Pain: YES- but improving  Itching: YES    Accompanying Signs & Symptoms:  Blood streaked toilet paper: YES  Blood in stool: no   Changes in stool pattern: YES    History:   Any previous GI studies done:none  Family History of colon cancer: no     Precipitating factors:   None    Alleviating factors:  None    Therapies Tried and outcome: Witch hazel      Patient Active Problem List   Diagnosis     Cardiomyopathy, familial (H)     Contact dermatitis     Keratosis pilaris     Arrhythmogenic left ventricular dysplasia (H)     Hypothyroidism     Hyperlipidemia LDL goal <130     Arrhythmogenic right ventricular cardiomyopathy (H)     Past Surgical History:   Procedure Laterality Date     DENTAL SURGERY       EP LOOP RECORDER IMPLANT N/A 2020    Procedure: EP LOOP RECORDER IMPLANT;  Surgeon: Jasbir Gordillo MD;  Location:  HE[facility]ARD[facility] CATH LAB     Strabismus Surgery         Social History     Tobacco Use     Smoking status: Former Smoker     Years: 2.00     Types: Cigarettes     Last attempt to quit: 4/10/2001     Years since quittin.2     Smokeless tobacco: Never Used   Substance Use Topics     Alcohol use: Yes     Alcohol/week: 2.0 standard drinks     Types: 2 Standard drinks or equivalent per week     Family History   Problem Relation Age of Onset     Cancer Maternal  "Grandfather         Bladder     Other - See Comments Mother 58        hyperlipidemia, hypothyroidism, ARVD gene +     Other - See Comments Father         hyperlipidemia, hypothyroidism     Lupus Maternal Grandmother      Other - See Comments Brother 32        (Amari) ARVD s/p sudden cardiac arrest     Cardiovascular Brother 30        (Young) MI at age 30; type 1 DM; ARVD gene negative;      Diabetes Type 1 Brother      Other - See Comments Other         Alcohol Abuse     Diabetes Daughter 3        type I         Current Outpatient Medications   Medication Sig Dispense Refill     atorvastatin (LIPITOR) 20 MG tablet Take 1 tablet (20 mg) by mouth daily 90 tablet 3     Cholecalciferol (VITAMIN D3) 1000 units CAPS Take 1,000 Units by mouth       hydrocortisone (ANUSOL-HC) 25 MG suppository Place 1 suppository (25 mg) rectally 2 times daily 24 suppository 1     levothyroxine (SYNTHROID/LEVOTHROID) 200 MCG tablet Take 1 tablet (200 mcg) by mouth daily 90 tablet 3     metoprolol succinate ER (TOPROL-XL) 100 MG 24 hr tablet Take 1 tablet (100 mg) by mouth daily 90 tablet 3     metroNIDAZOLE (METROGEL) 0.75 % external gel Apply topically once for 1 dose 45 g 1     Pramoxine-HC (HYDROCORTISONE ACE-PRAMOXINE) 2.5-1 % CREA Apply to affected area 2-3 times daily 57 g 1     triamcinolone (KENALOG) 0.1 % cream Apply topically 2 times daily 80 g 1     No Known Allergies    Reviewed and updated as needed this visit by Provider  Tobacco  Allergies  Meds  Problems  Med Hx  Surg Hx  Fam Hx         Review of Systems   Constitutional, HEENT, cardiovascular, pulmonary, gi and gu systems are negative, except as otherwise noted.      Objective    BP (!) 80/49 (BP Location: Left arm, Patient Position: Sitting, Cuff Size: Adult Large)   Pulse 83   Temp 97.5  F (36.4  C) (Temporal)   Ht 1.581 m (5' 2.25\")   Wt 70.2 kg (154 lb 12 oz)   LMP 07/10/2020   SpO2 98%   BMI 28.08 kg/m    Body mass index is 28.08 kg/m .  Physical Exam " "  GENERAL: healthy, alert and no distress  NECK: no adenopathy, no asymmetry, masses, or scars and thyroid normal to palpation  RESP: lungs clear to auscultation - no rales, rhonchi or wheezes  CV: regular rate and rhythm, normal S1 S2, no S3 or S4, no murmur, click or rub, no peripheral edema and peripheral pulses strong  ABDOMEN: soft, nontender, no hepatosplenomegaly, no masses and bowel sounds normal  RECTAL (female): small resolving external hemorrhoid with generalized swelling.  Digital exam reveals significant swelling at the internal sphincter.  Anoscopy: Both internal and external hemorrhoids with area of hyperemia posterior internal sphincter.        Assessment & Plan       ICD-10-CM    1. Thrombosed external hemorrhoids  K64.5 Pramoxine-HC (HYDROCORTISONE ACE-PRAMOXINE) 2.5-1 % CREA     hydrocortisone (ANUSOL-HC) 25 MG suppository       Etiology discussed.   See patient instructions.  Fiber gummies or citrucel.  Colace  mg 2-3 times daily for 2-4 weeks if having constipation.  Use miralax to treat constipation as needed.  Use the suppository for 1-2 weeks and no longer.  The cream can be used for up to 2 weeks.  If the bleeding recurs and persists OR you develop severe pain please let me know.  We may want to see you again and consider referral to colorectal specialist.  BMI:   Estimated body mass index is 28.08 kg/m  as calculated from the following:    Height as of this encounter: 1.581 m (5' 2.25\").    Weight as of this encounter: 70.2 kg (154 lb 12 oz).     Return if symptoms worsen or fail to improve.    Betty Ashraf PA-C  [facility]  "

## 2020-07-16 NOTE — PATIENT INSTRUCTIONS
Fiber gummies or citrucel.  Colace  mg 2-3 times daily for 2-4 weeks if having constipation.  Use miralax to treat constipation as needed.  Use the suppository for 1-2 weeks and no longer.  The cream can be used for up to 2 weeks.  If the bleeding recurs and persists OR you develop severe pain please let me know.  We may want to see you again and consider referral to colorectal specialist.  It sometimes takes weeks for these to get better.  Vale Ashraf PA-C      Understanding Hemorrhoids    Hemorrhoid tissues are  cushions  of blood vessels that swell slightly during bowel movements. Too much pressure on the anal canal can make these tissues remain enlarged, become inflamed, and cause symptoms. This can happen both inside and outside the anal canal.  Parts of the anal canal  The parts of the anal canal are:    Internal hemorrhoid tissue is in the upper area of the anal canal.    The rectum is the last several inches of the colon. This is where stool is stored prior to bowel movements.    Anal sphincters are ring-shaped muscles that expand and contract to control the anal opening.    External hemorrhoid tissue lies under the anal skin.    The anus is the passage between the rectum and the outside of the body.  Normal hemorrhoid tissue  Hemorrhoid tissues play an important role in helping your body eliminate waste. Food passes from the stomach through the intestines. The waste (stool) then travels through the colon to the rectum. It is stored in the rectum until it s ready to be passed from the anus. During bowel movements, hemorrhoids swell with blood and become slightly larger. This swelling helps protect and cushion the anal canal as stool passes from the body. Once the stool has passed, the tissues stop swelling and return to normal.  Problem hemorrhoids  Pressure due to straining or other factors can cause hemorrhoid tissues to remain swollen. When this happens to the hemorrhoid tissues in the anal canal  they re called internal hemorrhoids. Swollen tissues around the anal opening are called external hemorrhoids. Depending on the location, your symptoms can differ.    Internal hemorrhoids often happen in clusters around the wall of the anal canal. They are usually painless. But they may prolapse (protrude out of the anus) due to straining or pressure from hard stool. After the bowel movement is over, they may then reduce (return inside the body). Internal hemorrhoids often bleed. They can also discharge mucus.      External hemorrhoids are located at the anal opening, just beneath the skin. These tissues rarely cause problems unless they thrombose (form a blood clot). When this happens, a hard, bluish lump may appear. A thrombosed hemorrhoid also causes sudden, severe pain. In time, the clot may go away on its own. This sometimes leaves a  skin tag  of tissue stretched by the clot.  Hemorrhoid symptoms  Hemorrhoid symptoms may include:    Pain or a burning sensation    Bleeding during bowel movements    Protrusion of tissue from the anus    Itching around the anus  Causes of hemorrhoids  There s no single cause of hemorrhoids. Most often, though, they are caused by too much pressure on the anal canal. This can be due to:    Chronic (ongoing) constipation    Straining during bowel movements    Sitting too long on the toilet    Strenuous exercise or heavy lifting    Pregnancy and childbirth    Aging    Diarrhea  Date Last Reviewed: 7/1/2016 2000-2019 [employer]. [address] [place] [address]. All rights reserved. This information is not intended as a substitute for professional medical care. Always follow your healthcare professional's instructions.           Patient Education     Treating Hemorrhoids: Self-Care    Follow your healthcare provider s advice about caring for your hemorrhoids at home. Some treatments help relieve symptoms right away. Others involve making changes in your diet  and exercise habits. These can help ease constipation and prevent hemorrhoid symptoms from coming back.  Relieving symptoms  Your healthcare provider may prescribe anti-inflammatory medicine to help ease your symptoms. The following tips will also help relieve pain and swelling.    Take sitz baths. Taking a sitz bath means sitting in a few inches of warm bath water. Soaking for 10 minutes twice a day can provide welcome relief from painful hemorrhoids. It can also help the area stay clean.    Develop good bowel habits. Use the bathroom when you need to. Don t ignore the urge to move your bowels. This can lead to constipation, hard stools, and straining. Also, don t read while on the toilet. Sit only as long as needed. Wipe gently with soft, unscented toilet tissue or baby wipes.    Use ice packs. Placing an ice pack on a thrombosed external hemorrhoid can help relieve pain right away. It will also help reduce the blood clot. Use the ice for 15 to 20 minutes at a time. Keep a cloth between the ice and your skin to prevent skin damage.    Use other measures. Laxatives and enemas can help ease constipation. But use them only on your healthcare provider s advice. For symptom relief, try using cotton pads soaked in witch hazel. These are available at most drugstores. Over-the-counter hemorrhoid ointments and petroleum jelly can also provide relief.  Add fiber to your diet  Adding fiber to your diet can help relieve constipation by making stools softer and easier to pass. To increase your fiber intake, your healthcare provider may recommend a bulking agent, such as psyllium. This is a high-fiber supplement available at most grocery stores and drugstores. Eating more fiber-rich foods will also help. There are two types of fiber:    Insoluble fiber is the main ingredient in bulking agents. It s also found in foods such as wheat bran, whole-grain breads, fresh fruits, and vegetables.    Soluble fiber is found in foods such  as oat bran. Although soluble fiber is good for you, it may not ease constipation as much as foods high in insoluble fiber.  Drink more water  Along with a high-fiber diet, drinking more water can help ease constipation. This is because insoluble fiber absorbs water, making stools soft and bulky. Be sure to drink plenty of water throughout the day. Drinking fruit juices, such as prune juice or apple juice, can also help prevent constipation.  Get more exercise  Regular exercise aids digestion and helps prevent constipation. It s also great for your health. So talk with your healthcare provider about starting an exercise program. Low-impact activities, such as swimming or walking, are good places to start. Take it easy at first. And remember to drink plenty of water when you exercise.  High-fiber foods  High-fiber foods offer many benefits. By making your stools softer, they help heal and prevent swollen hemorrhoids. They may also help reduce the risk of colon and rectal cancer. Best of all, they re usually low in calories and taste great. Here are some examples of fiber-rich foods.    Whole grains, such as wheat bran, corn bran, and brown rice.    Vegetables, especially carrots, broccoli, cabbage, and peas.    Fruits, such as apples, bananas, raisins, peaches, and pears.    Nuts and legumes, especially peanuts, lentils, and kidney beans.  Easy ways to add fiber  The tips below offer some simple ways to add more high-fiber foods to your meals.    Start your day with a high-fiber breakfast. Eat a wheat bran cereal along with a sliced banana. Or, try peanut butter on whole-wheat toast.    Eat carrot sticks for snacks. They re easy to prepare, taste great, and are low in calories.    Use whole-grain breads instead of white bread for sandwiches.    Eat fruits for treats. Try an apple and some raisins instead of a candy bar.   Date Last Reviewed: 7/1/2016 2000-2019 [employer]. [address] [facility],  [place] [address]. All rights reserved. This information is not intended as a substitute for professional medical care. Always follow your healthcare professional's instructions.

## 2020-07-16 NOTE — NURSING NOTE
"40 year old  Chief Complaint   Patient presents with     Gastrointestinal Problem     rectal bleeding  bright  red blood with discomfort x 2 wks  lose stools        Blood pressure (!) 80/49, pulse 83, temperature 97.5  F (36.4  C), temperature source Temporal, height 1.581 m (5' 2.25\"), weight 70.2 kg (154 lb 12 oz), SpO2 98 %, not currently breastfeeding. Body mass index is 28.08 kg/m .  Patient Active Problem List   Diagnosis     Cardiomyopathy, familial (H)     Contact dermatitis     Keratosis pilaris     Arrhythmogenic left ventricular dysplasia (H)     Hypothyroidism     Hyperlipidemia LDL goal <130     Arrhythmogenic right ventricular cardiomyopathy (H)       Wt Readings from Last 2 Encounters:   20 70.2 kg (154 lb 12 oz)   20 72.2 kg (159 lb 4 oz)     BP Readings from Last 3 Encounters:   20 (!) 80/49   20 103/69   20 99/55         Current Outpatient Medications   Medication     atorvastatin (LIPITOR) 20 MG tablet     Cholecalciferol (VITAMIN D3) 1000 units CAPS     levothyroxine (SYNTHROID/LEVOTHROID) 200 MCG tablet     metoprolol succinate ER (TOPROL-XL) 100 MG 24 hr tablet     triamcinolone (KENALOG) 0.1 % cream     No current facility-administered medications for this visit.        Social History     Tobacco Use     Smoking status: Former Smoker     Years: 2.00     Types: Cigarettes     Last attempt to quit: 4/10/2001     Years since quittin.2     Smokeless tobacco: Never Used   Substance Use Topics     Alcohol use: Yes     Alcohol/week: 2.0 standard drinks     Types: 2 Standard drinks or equivalent per week     Drug use: No       Health Maintenance Due   Topic Date Due     HIV SCREENING  1993     PREVENTIVE CARE VISIT  2020       Lab Results   Component Value Date    PAP ASC-US 2018 4:13 PM    "

## 2020-09-28 DIAGNOSIS — I42.8 ARRHYTHMOGENIC LEFT VENTRICULAR DYSPLASIA (H): ICD-10-CM

## 2020-09-30 RX ORDER — METOPROLOL SUCCINATE 100 MG/1
100 TABLET, EXTENDED RELEASE ORAL DAILY
Qty: 90 TABLET | Refills: 3 | Status: SHIPPED | OUTPATIENT
Start: 2020-09-30 | End: 2021-10-05

## 2020-10-06 ENCOUNTER — ANCILLARY PROCEDURE (OUTPATIENT)
Dept: CARDIOLOGY | Facility: CLINIC | Age: 40
End: 2020-10-06
Attending: INTERNAL MEDICINE
Payer: COMMERCIAL

## 2020-10-06 DIAGNOSIS — Z95.818 STATUS POST PLACEMENT OF IMPLANTABLE LOOP RECORDER: ICD-10-CM

## 2020-10-06 DIAGNOSIS — I42.8 ARRHYTHMOGENIC RIGHT VENTRICULAR CARDIOMYOPATHY (H): ICD-10-CM

## 2020-10-06 PROCEDURE — G2066 INTER DEVC REMOTE 30D: HCPCS

## 2020-10-06 PROCEDURE — 93298 REM INTERROG DEV EVAL SCRMS: CPT | Performed by: INTERNAL MEDICINE

## 2020-10-14 LAB
MDC_IDC_MSMT_BATTERY_STATUS: NORMAL
MDC_IDC_PG_IMPLANT_DTM: NORMAL
MDC_IDC_PG_MFG: NORMAL
MDC_IDC_PG_MODEL: NORMAL
MDC_IDC_PG_SERIAL: NORMAL
MDC_IDC_PG_TYPE: NORMAL
MDC_IDC_SESS_CLINIC_NAME: NORMAL
MDC_IDC_SESS_DTM: NORMAL
MDC_IDC_SESS_TYPE: NORMAL
MDC_IDC_SET_ZONE_DETECTION_INTERVAL: 2000 MS
MDC_IDC_SET_ZONE_DETECTION_INTERVAL: 3000 MS
MDC_IDC_SET_ZONE_DETECTION_INTERVAL: 320 MS
MDC_IDC_SET_ZONE_TYPE: NORMAL
MDC_IDC_STAT_AT_BURDEN_PERCENT: 0 %
MDC_IDC_STAT_AT_DTM_END: NORMAL
MDC_IDC_STAT_AT_DTM_START: NORMAL
MDC_IDC_STAT_EPISODE_RECENT_COUNT: 0
MDC_IDC_STAT_EPISODE_RECENT_COUNT_DTM_END: NORMAL
MDC_IDC_STAT_EPISODE_RECENT_COUNT_DTM_START: NORMAL
MDC_IDC_STAT_EPISODE_TOTAL_COUNT: 0
MDC_IDC_STAT_EPISODE_TOTAL_COUNT: 1
MDC_IDC_STAT_EPISODE_TOTAL_COUNT_DTM_END: NORMAL
MDC_IDC_STAT_EPISODE_TOTAL_COUNT_DTM_START: NORMAL
MDC_IDC_STAT_EPISODE_TYPE: NORMAL

## 2020-11-08 ENCOUNTER — VIRTUAL VISIT (OUTPATIENT)
Dept: FAMILY MEDICINE | Facility: OTHER | Age: 40
End: 2020-11-08

## 2020-11-08 NOTE — PROGRESS NOTES
"Date: 2020 11:07:33  Clinician: Abner Wallis  Clinician NPI: [id number]  Patient: Maren Bee  Patient : 1980  Patient Address: [address] [place] [address]  Patient Phone: [phone]  Visit Protocol: URI  Patient Summary:  Maren is a 40 year old ( : 1980 ) female who initiated a [facility] Visit for COVID-19 (Coronavirus) evaluation and screening. When asked the question \"Please sign me up to receive news, health information and promotions from [facility].\", Maren responded \"No\".    Maren states her symptoms started gradually 3-4 days ago. After her symptoms started, they improved and then got worse again.   Her symptoms consist of rhinitis, myalgia, malaise, a sore throat, ageusia, a headache, a cough, and anosmia. She is experiencing mild difficulty breathing with activities but can speak normally in full sentences.   Symptom details     Nasal secretions: The color of her mucus is clear.    Cough: Maren coughs every 5-10 minutes and her cough is more bothersome at night. Phlegm does not come into her throat when she coughs. She does not believe her cough is caused by post-nasal drip.     Sore throat: Maren reports having mild throat pain (1-3 on a 10 point pain scale), does not have exudate on her tonsils, and can swallow liquids. The lymph nodes in her neck are not enlarged. A rash has not appeared on the skin since the sore throat started.     Headache: She states the headache is mild (1-3 on a 10 point pain scale).      Maren denies having vomiting, facial pain or pressure, chills, teeth pain, diarrhea, ear pain, wheezing, fever, enlarged lymph nodes, nasal congestion, and nausea. She also denies taking antibiotic medication in the past month, having recent facial or sinus surgery in the past 60 days, and having a sinus infection within the past year.   Precipitating events  Within the past week, Maren has not been exposed to someone with strep throat. She has " not recently been exposed to someone with influenza. Maren has not been in close contact with any high risk individuals.   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 She does not provide direct patient care. In the past 14 days, Maren has not worked or volunteered at a healthcare facility or group living setting.   In the past 14 days, she also has not lived in a congregate living setting.   Marne has not had a close contact with a laboratory-confirmed COVID-19 patient within 14 days of symptom onset.    Since December 2019, Maren has been tested for COVID-19 and has not had upper respiratory infection or influenza-like illness.      Result of COVID-19 test: Negative     Date of her COVID-19 test: 08/10/2020      Pertinent medical history  Maren does not get yeast infections when she takes antibiotics.   Maren needs a return to work/school note.   Weight: 145 lbs   Maren does not smoke or use smokeless tobacco.   She denies pregnancy and denies breastfeeding. She is currently menstruating.   Weight: 145 lbs    MEDICATIONS: cholecalciferol (vitamin D3) oral, atorvastatin oral, metoprolol succinate oral, Synthroid oral, ALLERGIES: NKDA  Clinician Response:  Dear Maren,   Your symptoms show that you may have coronavirus (COVID-19). This illness can cause fever, cough and trouble breathing. Many people get a mild case and get better on their own. Some people can get very sick.  What should I do?  We would like to test you for this virus.   1. Please call [phone] to schedule your visit. Explain that you were referred by [facility] to have a COVID-19 test. Be ready to share your [facility] visit ID number.  * If you need to schedule in [facility] please call [phone] or for [employer] employees please call [phone].  * If you need to schedule in the [place] area please call [phone]. [employer] employees call [phone].   "The following will serve as your written order for this COVID Test, ordered by me, for the indication of suspected COVID [Z20.828]: The test will be ordered in [employer], our electronic health record, after you are scheduled. It will show as ordered and authorized by Aleksandar Childress MD.  Order: COVID-19 (Coronavirus) PCR [facility]TOMATIC [facility].   2. When it's time for your COVID test:  Stay at least 6 feet away from others. (If someone will drive you to your test, stay in the backseat, as far away from the  as you can.)   Cover your mouth and nose with a mask, tissue or washcloth.  Go straight to the testing site. Don't make any stops on the way there or back.      3.Starting now: Stay home and away from others (self-isolate) until:   You've had no fever---and no medicine that reduces fever---for one full day (24 hours). And...   Your other symptoms have gotten better. For example, your cough or breathing has improved. And...   At least 10 days have passed since your symptoms started.       During this time, don't leave the house except for testing or medical care.   Stay in your own room, even for meals. Use your own bathroom if you can.   Stay away from others in your home. No hugging, kissing or shaking hands. No visitors.  Don't go to work, school or anywhere else.    Clean \"high touch\" surfaces often (doorknobs, counters, handles, etc.). Use a household cleaning spray or wipes. You'll find a full list of  on the EPA website: www.epa.gov/pesticide-registration/list-n-disinfectants-use-against-sars-cov-2.   Cover your mouth and nose with a mask, tissue or washcloth to avoid spreading germs.  Wash your hands and face often. Use soap and water.  Caregivers in these groups are at risk for severe illness due to COVID-19:  o People 65 years and older  o People who live in a nursing home or long-term care facility  o People with chronic disease (lung, heart, cancer, diabetes, kidney, liver, " immunologic)  o People who have a weakened immune system, including those who:   Are in cancer treatment  Take medicine that weakens the immune system, such as corticosteroids  Had a bone marrow or organ transplant  Have an immune deficiency  Have poorly controlled HIV or AIDS  Are obese (body mass index of 40 or higher)  Smoke regularly   o Caregivers should wear gloves while washing dishes, handling laundry and cleaning bedrooms and bathrooms.  o Use caution when washing and drying laundry: Don't shake dirty laundry, and use the warmest water setting that you can.  o For more tips, go to www.cdc.gov/coronavirus/2019-ncov/downloads/10Things.pdf.    4.Sign up for [employer]. We know it's scary to hear that you might have COVID-19. We want to track your symptoms to make sure you're okay over the next 2 weeks. Please look for an email from [employer]---this is a free, online program that we'll use to keep in touch. To sign up, follow the link in the email. Learn more at [email or web address]  How can I take care of myself?   Get lots of rest. Drink extra fluids (unless a doctor has told you not to).   Take Tylenol (acetaminophen) for fever or pain. If you have liver or kidney problems, ask your family doctor if it's okay to take Tylenol.   Adults can take either:    650 mg (two 325 mg pills) every 4 to 6 hours, or...   1,000 mg (two 500 mg pills) every 8 hours as needed.    Note: Don't take more than 3,000 mg in one day. Acetaminophen is found in many medicines (both prescribed and over-the-counter medicines). Read all labels to be sure you don't take too much.   For children, check the Tylenol bottle for the right dose. The dose is based on the child's age or weight.    If you have other health problems (like cancer, heart failure, an organ transplant or severe kidney disease): Call your specialty clinic if you don't feel better in the next 2 days.       Know when to call 911. Emergency warning signs  include:    Trouble breathing or shortness of breath Pain or pressure in the chest that doesn't go away Feeling confused like you haven't felt before, or not being able to wake up Bluish-colored lips or face.  Where can I get more information?   [facility] -- About COVID-19: [email or web address]   CDC -- What to Do If You're Sick: www.cdc.gov/coronavirus/2019-ncov/about/steps-when-sick.html   CDC -- Ending Home Isolation: www.cdc.gov/coronavirus/2019-ncov/hcp/disposition-in-home-patients.html   [facility] -- Caring for Someone: www.cdc.gov/coronavirus/2019-ncov/if-you-are-sick/care-for-someone.html   [facility] -- Interim Guidance for Hospital Discharge to Home: www.[facility].[place]./diseases/coronavirus/hcp/hospdischarge.pdf   [facility] clinical trials (COVID-19 research studies): clinicalaffairs.[facility]-clinical-trials    Below are the COVID-19 hotlines at the [facility] of Health ([facility]). Interpreters are available.    For health questions: Call [phone] or 1-865.895.9536 (7 a.m. to 7 p.m.) For questions about schools and childcare: Call 014-459-0805 or 1-524.748.3434 (7 a.m. to 7 p.m.)    Diagnosis: Contact with and (suspected) exposure to other viral communicable diseases  Diagnosis ICD: Z20.828

## 2020-11-10 PROBLEM — U07.1 CLINICAL DIAGNOSIS OF COVID-19: Status: ACTIVE | Noted: 2020-11-10

## 2020-12-22 ENCOUNTER — TELEPHONE (OUTPATIENT)
Dept: SURGERY | Facility: CLINIC | Age: 40
End: 2020-12-22

## 2020-12-22 NOTE — TELEPHONE ENCOUNTER
[facility] Call Center    Phone Message    May a detailed message be left on voicemail: yes     Reason for Call: Other: Pt calling in to be scheduled from her referral for Thrombosed external hemorrhoids. Please follow up. Pt does have pain, but not severe. Thank  you      Action Taken: Message routed to:  [facility]    Travel Screening: Not Applicable

## 2020-12-22 NOTE — TELEPHONE ENCOUNTER
"Patient intake call for \"hemorrhoids\".    Called to assess symptoms.    History:     Patient was seen at [facility] by primary care who did anoscopy and diagnosed her with hemorrhoids. Primary prescribed suppository, external cream, and increase fiber.    Symptoms slightly improved, but now have gotten worse.    No previous colonoscopy or colorectal surgeries.  Current Symptoms:    Patient complains of 8/10 pain with BMs. She describes this pain as sharp, and like pooping out a razor blade.    Patient also has bleeding that drips into toilet, mostly with BMs.      Plan:    Symptoms seem consistent with anal fissure.    Set up appointment with LING Vega, in January.    Will send MyChart with tips in the meantime.    Lisa Mills, EMT  Colon and Rectal Surgery    "

## 2020-12-23 NOTE — TELEPHONE ENCOUNTER
RECORDS RECEIVED FROM: Internal    DATE RECEIVED: 1/4/2021 7AM   NOTES STATUS DETAILS   OFFICE NOTE from referring provider  Internal Internal referral    MEDICATION LIST Internal

## 2021-01-10 ENCOUNTER — HEALTH MAINTENANCE LETTER (OUTPATIENT)
Age: 41
End: 2021-01-10

## 2021-01-14 ENCOUNTER — ANCILLARY PROCEDURE (OUTPATIENT)
Dept: CARDIOLOGY | Facility: CLINIC | Age: 41
End: 2021-01-14
Attending: INTERNAL MEDICINE
Payer: COMMERCIAL

## 2021-01-14 ENCOUNTER — PRE VISIT (OUTPATIENT)
Dept: SURGERY | Facility: CLINIC | Age: 41
End: 2021-01-14

## 2021-01-14 DIAGNOSIS — I42.8 ARRHYTHMOGENIC RIGHT VENTRICULAR CARDIOMYOPATHY (H): ICD-10-CM

## 2021-01-14 PROCEDURE — G2066 INTER DEVC REMOTE 30D: HCPCS

## 2021-01-14 PROCEDURE — 93297 REM INTERROG DEV EVAL ICPMS: CPT | Performed by: INTERNAL MEDICINE

## 2021-01-22 ENCOUNTER — OFFICE VISIT (OUTPATIENT)
Dept: SURGERY | Facility: CLINIC | Age: 41
End: 2021-01-22
Attending: PHYSICIAN ASSISTANT
Payer: COMMERCIAL

## 2021-01-22 VITALS
WEIGHT: 168 LBS | DIASTOLIC BLOOD PRESSURE: 64 MMHG | SYSTOLIC BLOOD PRESSURE: 106 MMHG | HEIGHT: 65 IN | HEART RATE: 54 BPM | OXYGEN SATURATION: 100 % | BODY MASS INDEX: 27.99 KG/M2

## 2021-01-22 DIAGNOSIS — K60.2 ANAL FISSURE: Primary | ICD-10-CM

## 2021-01-22 PROCEDURE — 99202 OFFICE O/P NEW SF 15 MIN: CPT | Performed by: NURSE PRACTITIONER

## 2021-01-22 ASSESSMENT — ENCOUNTER SYMPTOMS
HEARTBURN: 0
NAUSEA: 0
RECTAL PAIN: 1
JAUNDICE: 0
BLOATING: 0
VOMITING: 0
CONSTIPATION: 0
ABDOMINAL PAIN: 0
DIARRHEA: 0
BLOOD IN STOOL: 1
BOWEL INCONTINENCE: 1

## 2021-01-22 ASSESSMENT — PAIN SCALES - GENERAL: PAINLEVEL: NO PAIN (0)

## 2021-01-22 ASSESSMENT — MIFFLIN-ST. JEOR: SCORE: 1422.92

## 2021-01-22 NOTE — PROGRESS NOTES
"Colon and Rectal Surgery Consult Clinic Note    Date: 2021     Referring provider:  Btety Ashraf PA-C  [address] [place] [id number]     RE: Maren Bee  : 1980  SEB: 2021    Maren Bee is a very pleasant 40 year old female who presents today for hemorrhoids and possible anal fissure.    Maren reports that she initially developed some rectal bleeding and discomfort in .  At that time she increase her dietary fiber in the symptoms resolved.  However, in July she had return of bleeding and discomfort.  She was started on hemorrhoid suppositories and an external cream for internal and external hemorrhoids.  This seemed to help her hemorrhoid flare but she continues to have bleeding and sharp pain with bowel movements that lasts up to 15 minutes.  She will occasionally use a steroid cream for this.  No tissue that needs to be manually reduced.  She has had constipation in the past but not recently and stools have now been on the soft to loose side.  She has had 2 vaginal births and denies any difficulty holding stool or flatus.  Family history with maternal grand mother with colon cancer in her 80s and mother with polyps and a basal cell cancer.  Maternal grandfather with bladder cancer in his 60s.  She has never had a colonoscopy.    Physical Examination:  /64 (BP Location: Left arm, Patient Position: Sitting, Cuff Size: Adult Regular)   Pulse 54   Ht 5' 5\"   Wt 168 lb   SpO2 100%   BMI 27.96 kg/m    General: Alert, oriented, in no acute distress, sitting comfortably  HEENT: Mucous membranes moist    Perianal external examination: Exam was chaperoned by Bella Gil MA  Perianal skin: Intact with no excoriation or lichenification.  Lesions: No evidence of an external lesion, nodularity, or induration in the perianal region.  Eversion of buttocks: There was evidence of an anal fissure. Details: posterior midline anal fissure.  Skin tags or " external hemorrhoids: small external skin tags.  Digital rectal examination: Was deferred in order not to cause further trauma    Anoscopy: Was deferred in order not to cause further trauma    Assessment/Plan: 40 year old female with anal fissure. I discussed that this is likely the source of the pain and bleeding. Discussed the importance of keeping stools soft and easy to pass while healing fissure.  Recommended starting on a daily fiber supplement and can add in a laxative in addition as needed.  Will treat with topical diltiazem with follow up in 8 weeks. Discussed that it may take several weeks for symptoms to improve. If no improvement is noted after 4 weeks, return to clinic and discuss further intervention such as Botox injections.  Advised the use of Tylenol, ibuprofen, and warm tub baths for any pain.  If bleeding persist despite treatment of fissure, would recommend a colonoscopy. Patient's questions were answered to her stated satisfaction and she is in agreement with this plan.    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  11/10/20         Arrhythmogenic right ventricular cardiomyopathy (H) 05/26/2020     Priority: Medium     Hyperlipidemia LDL goal <130 05/07/2019     Priority: Medium     Hypothyroidism 03/15/2016     Priority: Medium     Arrhythmogenic left ventricular dysplasia (H)  02/22/2016     Priority: Medium     Seen on MRI  Cardiologist started Beta blocker, consider ICD, she is referred to EP specialist       Contact dermatitis 12/14/2012     Priority: Medium     Keratosis pilaris 12/14/2012     Priority: Medium     Cardiomyopathy, familial (H) 07/18/2012     Priority: Medium     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         Medications:  Current Outpatient Medications   Medication Sig Dispense Refill     atorvastatin (LIPITOR) 20 MG tablet Take 1 tablet (20 mg) by mouth daily 90 tablet 3     Cholecalciferol (VITAMIN D3) 1000 units CAPS Take 1,000 Units by mouth       diltiazem 2% in PLO gel Apply small amount to anal opening three times daily for 8 weeks. 60 g 0     hydrocortisone (ANUSOL-HC) 25 MG suppository Place 1 suppository (25 mg) rectally 2 times daily 24 suppository 1     levothyroxine (SYNTHROID/LEVOTHROID) 200 MCG tablet Take 1 tablet (200 mcg) by mouth daily 90 tablet 3     metoprolol succinate ER (TOPROL-XL) 100 MG 24 hr tablet Take 1 tablet (100 mg) by mouth daily 90 tablet 3     Pramoxine-HC (HYDROCORTISONE ACE-PRAMOXINE) 2.5-1 % CREA Apply to affected area 2-3 times daily 57 g 1     triamcinolone (KENALOG) 0.1 % cream Apply topically 2 times daily 80 g 1       Allergies:  No Known Allergies    Family history:  Family History   Problem Relation Age of Onset     Cancer Maternal Grandfather         Bladder     Other - See Comments Mother 58        hyperlipidemia, hypothyroidism, ARVD gene +     Other - See Comments Father         hyperlipidemia, hypothyroidism     Lupus Maternal Grandmother      Other - See Comments Brother 32        (Amari) ARVD s/p sudden cardiac arrest     Cardiovascular Brother 30        (Young) MI at age 30; type 1 DM; ARVD gene negative;      Diabetes Type 1 Brother      Other - See Comments Other         Alcohol Abuse     Diabetes Daughter 3        type I       Social history:  Social History     Tobacco Use     Smoking status:  "Former Smoker     Years: 2.00     Types: Cigarettes     Quit date: 4/10/2001     Years since quittin.8     Smokeless tobacco: Never Used   Substance Use Topics     Alcohol use: Yes     Alcohol/week: 2.0 standard drinks     Types: 2 Standard drinks or equivalent per week    Marital status: .  Occupation: genetic counselor.    Nursing Notes:   Bella Gil  2021 10:30 AM  Signed  Chief Complaint   Patient presents with     New Patient     rectal pain and bleeding       Vitals:    21 1028   BP: 106/64   BP Location: Left arm   Patient Position: Sitting   Cuff Size: Adult Regular   Pulse: 54   SpO2: 100%   Weight: 168 lb   Height: 5' 5\"       Body mass index is 27.96 kg/m .    Bella Gil MA         15 minutes spent on the date of the encounter doing chart review, history and exam, documentation and further activities as noted above.     DAVI Vega, NP-C  Colon and Rectal Surgery   [facility]    This note was created using speech recognition software and may contain unintended word substitutions.    "

## 2021-01-22 NOTE — NURSING NOTE
"Chief Complaint   Patient presents with     New Patient     rectal pain and bleeding       Vitals:    01/22/21 1028   BP: 106/64   BP Location: Left arm   Patient Position: Sitting   Cuff Size: Adult Regular   Pulse: 54   SpO2: 100%   Weight: 168 lb   Height: 5' 5\"       Body mass index is 27.96 kg/m .    Bella Gil MA    "

## 2021-01-22 NOTE — LETTER
"2021       RE: Maren Bee  [address]  [facility] [address]     Dear Colleague,    Thank you for referring your patient, Maren Bee, to the [facility]. Please see a copy of my visit note below.    Colon and Rectal Surgery Consult Clinic Note    Date: 2021     Referring provider:  Betty Ashraf PA-C  [address] [place] [id number]     RE: Maren Bee  : 1980  SEB: 2021    Maren Bee is a very pleasant 40 year old female who presents today for hemorrhoids and possible anal fissure.    Maren reports that she initially developed some rectal bleeding and discomfort in .  At that time she increase her dietary fiber in the symptoms resolved.  However, in July she had return of bleeding and discomfort.  She was started on hemorrhoid suppositories and an external cream for internal and external hemorrhoids.  This seemed to help her hemorrhoid flare but she continues to have bleeding and sharp pain with bowel movements that lasts up to 15 minutes.  She will occasionally use a steroid cream for this.  No tissue that needs to be manually reduced.  She has had constipation in the past but not recently and stools have now been on the soft to loose side.  She has had 2 vaginal births and denies any difficulty holding stool or flatus.  Family history with maternal grand mother with colon cancer in her 80s and mother with polyps and a basal cell cancer.  Maternal grandfather with bladder cancer in his 60s.  She has never had a colonoscopy.    Physical Examination:  /64 (BP Location: Left arm, Patient Position: Sitting, Cuff Size: Adult Regular)   Pulse 54   Ht 5' 5\"   Wt 168 lb   SpO2 100%   BMI 27.96 kg/m    General: Alert, oriented, in no acute distress, sitting comfortably  HEENT: Mucous membranes moist    Perianal external " examination: Exam was chaperoned by Bella Gil MA  Perianal skin: Intact with no excoriation or lichenification.  Lesions: No evidence of an external lesion, nodularity, or induration in the perianal region.  Eversion of buttocks: There was evidence of an anal fissure. Details: posterior midline anal fissure.  Skin tags or external hemorrhoids: small external skin tags.  Digital rectal examination: Was deferred in order not to cause further trauma    Anoscopy: Was deferred in order not to cause further trauma    Assessment/Plan: 40 year old female with anal fissure. I discussed that this is likely the source of the pain and bleeding. Discussed the importance of keeping stools soft and easy to pass while healing fissure.  Recommended starting on a daily fiber supplement and can add in a laxative in addition as needed.  Will treat with topical diltiazem with follow up in 8 weeks. Discussed that it may take several weeks for symptoms to improve. If no improvement is noted after 4 weeks, return to clinic and discuss further intervention such as Botox injections.  Advised the use of Tylenol, ibuprofen, and warm tub baths for any pain.  If bleeding persist despite treatment of fissure, would recommend a colonoscopy. Patient's questions were answered to her stated satisfaction and she is in agreement with this plan.    Medical history:  Past Medical History:   Diagnosis Date     Cardiomyopathy, familial (H) 7/18/2012    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      Hypothyroidism        Surgical history:  Past Surgical History:   Procedure Laterality Date     DENTAL SURGERY       EP LOOP RECORDER IMPLANT N/A 1/24/2020    Procedure: EP LOOP RECORDER IMPLANT;  Surgeon: Jasbir Gordillo MD;  Location:  [facility]     Strabismus Surgery         Problem list:  Patient Active Problem List    Diagnosis Date Noted     Clinical diagnosis of COVID-19 11/10/2020     Priority: Medium      Positive test following illness and loss of taste and smell.  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         Medications:  Current Outpatient Medications   Medication Sig Dispense Refill     atorvastatin (LIPITOR) 20 MG tablet Take 1 tablet (20 mg) by mouth daily 90 tablet 3     Cholecalciferol (VITAMIN D3) 1000 units CAPS Take 1,000 Units by mouth       diltiazem 2% in PLO gel Apply small amount to anal opening three times daily for 8 weeks. 60 g 0     hydrocortisone (ANUSOL-HC) 25 MG suppository Place 1 suppository (25 mg) rectally 2 times daily 24 suppository 1     levothyroxine (SYNTHROID/LEVOTHROID) 200 MCG tablet Take 1 tablet (200 mcg) by mouth daily 90 tablet 3     metoprolol succinate ER (TOPROL-XL) 100 MG 24 hr tablet Take 1 tablet (100 mg) by mouth daily 90 tablet 3     Pramoxine-HC (HYDROCORTISONE ACE-PRAMOXINE) 2.5-1 % CREA Apply to affected area 2-3 times daily 57 g 1     triamcinolone (KENALOG) 0.1 % cream Apply topically 2 times daily 80 g 1       Allergies:  No Known Allergies    Family history:  Family History   Problem Relation Age of Onset     Cancer Maternal Grandfather         Bladder     Other - See Comments Mother 58        hyperlipidemia, hypothyroidism, ARVD gene +     Other - See Comments Father         hyperlipidemia, hypothyroidism     Lupus Maternal Grandmother      Other - See Comments Brother 32      "   (Amari) ARVD s/p sudden cardiac arrest     Cardiovascular Brother 30        (Young) MI at age 30; type 1 DM; ARVD gene negative;      Diabetes Type 1 Brother      Other - See Comments Other         Alcohol Abuse     Diabetes Daughter 3        type I       Social history:  Social History     Tobacco Use     Smoking status: Former Smoker     Years: 2.00     Types: Cigarettes     Quit date: 4/10/2001     Years since quittin.8     Smokeless tobacco: Never Used   Substance Use Topics     Alcohol use: Yes     Alcohol/week: 2.0 standard drinks     Types: 2 Standard drinks or equivalent per week    Marital status: .  Occupation: genetic counselor.    Nursing Notes:   Bella Gil  2021 10:30 AM  Signed  Chief Complaint   Patient presents with     New Patient     rectal pain and bleeding       Vitals:    21 1028   BP: 106/64   BP Location: Left arm   Patient Position: Sitting   Cuff Size: Adult Regular   Pulse: 54   SpO2: 100%   Weight: 168 lb   Height: 5' 5\"       Body mass index is 27.96 kg/m .    Bella Gil MA      15 minutes spent on the date of the encounter doing chart review, history and exam, documentation and further activities as noted above.     DAVI eVga, NP-C  Colon and Rectal Surgery   [facility]    This note was created using speech recognition software and may contain unintended word substitutions.      "

## 2021-02-10 ENCOUNTER — ANCILLARY PROCEDURE (OUTPATIENT)
Dept: MAMMOGRAPHY | Facility: CLINIC | Age: 41
End: 2021-02-10
Attending: PHYSICIAN ASSISTANT
Payer: COMMERCIAL

## 2021-02-10 DIAGNOSIS — Z12.31 VISIT FOR SCREENING MAMMOGRAM: ICD-10-CM

## 2021-02-10 PROCEDURE — 77067 SCR MAMMO BI INCL CAD: CPT | Mod: GC | Performed by: RADIOLOGY

## 2021-02-10 PROCEDURE — 77063 BREAST TOMOSYNTHESIS BI: CPT | Mod: GC | Performed by: RADIOLOGY

## 2021-04-07 ENCOUNTER — OFFICE VISIT (OUTPATIENT)
Dept: FAMILY MEDICINE | Facility: CLINIC | Age: 41
End: 2021-04-07
Payer: COMMERCIAL

## 2021-04-07 VITALS
SYSTOLIC BLOOD PRESSURE: 104 MMHG | TEMPERATURE: 97.3 F | RESPIRATION RATE: 12 BRPM | HEART RATE: 51 BPM | DIASTOLIC BLOOD PRESSURE: 68 MMHG | HEIGHT: 65 IN | WEIGHT: 164.12 LBS | BODY MASS INDEX: 27.34 KG/M2 | OXYGEN SATURATION: 99 %

## 2021-04-07 DIAGNOSIS — I42.8 ARRHYTHMOGENIC RIGHT VENTRICULAR CARDIOMYOPATHY (H): ICD-10-CM

## 2021-04-07 DIAGNOSIS — R10.13 ABDOMINAL PAIN, EPIGASTRIC: Primary | ICD-10-CM

## 2021-04-07 LAB
% GRANULOCYTES: 60.6 %G (ref 40–75)
ALBUMIN SERPL-MCNC: 4 G/DL (ref 3.4–5)
ALP SERPL-CCNC: 68 U/L (ref 40–150)
ALT SERPL W P-5'-P-CCNC: 31 U/L (ref 0–50)
AMYLASE SERPL-CCNC: 68 U/L (ref 30–110)
ANION GAP SERPL CALCULATED.3IONS-SCNC: 6 MMOL/L (ref 3–14)
AST SERPL W P-5'-P-CCNC: 22 U/L (ref 0–45)
BILIRUB SERPL-MCNC: 0.3 MG/DL (ref 0.2–1.3)
BUN SERPL-MCNC: 14 MG/DL (ref 7–30)
CALCIUM SERPL-MCNC: 9.4 MG/DL (ref 8.5–10.1)
CHLORIDE SERPL-SCNC: 104 MMOL/L (ref 94–109)
CO2 SERPL-SCNC: 27 MMOL/L (ref 20–32)
CREAT SERPL-MCNC: 0.74 MG/DL (ref 0.52–1.04)
ERYTHROCYTE [DISTWIDTH] IN BLOOD BY AUTOMATED COUNT: 12.4 %
GFR SERPL CREATININE-BSD FRML MDRD: >90 ML/MIN/{1.73_M2}
GLUCOSE SERPL-MCNC: 81 MG/DL (ref 70–99)
GRANULOCYTES #: 4.8 K/UL (ref 1.6–8.3)
HCT VFR BLD AUTO: 40.6 % (ref 35–47)
HEMOGLOBIN: 13 G/DL (ref 11.7–15.7)
LYMPHOCYTES # BLD AUTO: 2.4 K/UL (ref 0.8–5.3)
LYMPHOCYTES NFR BLD AUTO: 29.9 %L (ref 20–48)
MCH RBC QN AUTO: 29.1 PG (ref 26.5–35)
MCHC RBC AUTO-ENTMCNC: 32 G/DL (ref 32–36)
MCV RBC AUTO: 90.8 FL (ref 78–100)
MID #: 0.8 K/UL (ref 0–2.2)
MID %: 9.5 %M (ref 0–20)
PLATELET # BLD AUTO: 306 K/UL (ref 150–450)
POTASSIUM SERPL-SCNC: 4.1 MMOL/L (ref 3.4–5.3)
PROT SERPL-MCNC: 7.6 G/DL (ref 6.8–8.8)
RBC # BLD AUTO: 4.47 M/UL (ref 3.8–5.2)
SODIUM SERPL-SCNC: 137 MMOL/L (ref 133–144)
WBC # BLD AUTO: 8 K/UL (ref 4–11)

## 2021-04-07 ASSESSMENT — MIFFLIN-ST. JEOR: SCORE: 1405.32

## 2021-04-07 NOTE — PROGRESS NOTES
Assessment & Plan     Abdominal pain, epigastric  DDx includes gallstone, gastritis, acid reflux, h pylori  Eval as below.  Trial of OTC omeprazole 20 mg daily.  Avoid fatty foods and antacids.  Follow up with me after the labs/ultrasound.  See patient instructions.     - CBC with Diff Plt ([facility])  - Comprehensive metabolic panel  - Amylase  - US abdomen complete; Future  - Helicobacter pylori Antigen Stool; Future    Familial cardiomyopathy:  Doing well.  Loop recorder device with investigation Q3 months.  No symptoms.  28 minutes spent on the date of the encounter doing chart review, history and exam, documentation and further activities per the note        Return if symptoms worsen or fail to improve, for Will notify patient of results.    Betty Ashraf PA-C  [facility]    Subjective   Maren is a 40 year old who presents for the following health issues     HPI     Abdominal/Flank Pain:  Epigastric abdominal pain.  Comes and goes. First noted within an hour of a high fat meal.  No associted nausea or vomitting.  Since then it has waxed and waned.  Yesterday with a guarded diet did OK until she layed down to sleep last night and the pain returned. She was able to sleep.   Bitter taste in her mouth over the last few months. Diagnosed with COVID in Nov and she lost taste and smell.  Not sure if her taste is still off due to COVID or if the bitter/metalic taste is something entirely different..   Onset/Duration: 2 days  Description:   Character: Sharp and Gnawing  Location: epigastric region  Radiation: Back  Intensity: 8/10 at its worse and now 2/10  Progression of Symptoms:  A bit better than with the first episode. and waxing and waning  Accompanying Signs & Symptoms:  Fever/Chills: no  Gas/Bloating: no  Nausea: no  Vomitting: no  Diarrhea: no  Constipation: no  Dysuria or Hematuria: no  History:   Trauma: no  Previous similar pain: no  Previous tests done: none  Precipitating factors:   Does  "the pain change with:     Food: Not sure if food makes it better or worse.  Has noted decreased appetite.      Bowel Movement: no    Urination: no   Other factors:  no  Therapies tried and outcome: None  Patient's last menstrual period was 03/24/2021.          Review of Systems   Constitutional, HEENT, cardiovascular, pulmonary, gi and gu systems are negative, except as otherwise noted.      Objective    /68   Pulse 51   Temp 97.3  F (36.3  C)   Resp 12   Ht 1.651 m (5' 5\")   Wt 74.4 kg (164 lb 1.9 oz)   LMP 03/24/2021   SpO2 99%   Breastfeeding No   BMI 27.31 kg/m    Body mass index is 27.31 kg/m .  Physical Exam   GENERAL: healthy, alert and no distress  NECK: no adenopathy, no asymmetry, masses, or scars and thyroid normal to palpation  RESP: lungs clear to auscultation - no rales, rhonchi or wheezes  CV: regular rate and rhythm, normal S1 S2, no S3 or S4, no murmur, click or rub, no peripheral edema and peripheral pulses strong  ABDOMEN: Hypoactive BS throughout. Mild tenderness with deep palpation epigastric region.  No hepatosplenomegaly. No rebound or guarding. Negative Palacios sign.  SKIN: no suspicious lesions or rashes  PSYCH: mentation appears normal, affect normal/bright                "

## 2021-04-07 NOTE — NURSING NOTE
"40 year old  Chief Complaint   Patient presents with     Abdominal Pain     started Saturday only in upper part of stomach and in the back area between the shoulder blades       Blood pressure 104/68, pulse 51, temperature 97.3  F (36.3  C), resp. rate 12, height 1.651 m (5' 5\"), weight 74.4 kg (164 lb 1.9 oz), last menstrual period 2021, SpO2 99 %, not currently breastfeeding. Body mass index is 27.31 kg/m .  Patient Active Problem List   Diagnosis     Cardiomyopathy, familial (H)     Contact dermatitis     Keratosis pilaris     Arrhythmogenic left ventricular dysplasia (H)     Hypothyroidism     Hyperlipidemia LDL goal <130     Arrhythmogenic right ventricular cardiomyopathy (H)     Clinical diagnosis of COVID-19       Wt Readings from Last 2 Encounters:   21 74.4 kg (164 lb 1.9 oz)   21 76.2 kg (168 lb)     BP Readings from Last 3 Encounters:   21 104/68   21 106/64   20 (!) 80/49         Current Outpatient Medications   Medication     atorvastatin (LIPITOR) 20 MG tablet     Cholecalciferol (VITAMIN D3) 1000 units CAPS     diltiazem 2% in PLO gel     levothyroxine (SYNTHROID/LEVOTHROID) 200 MCG tablet     metoprolol succinate ER (TOPROL-XL) 100 MG 24 hr tablet     hydrocortisone (ANUSOL-HC) 25 MG suppository     Pramoxine-HC (HYDROCORTISONE ACE-PRAMOXINE) 2.5-1 % CREA     triamcinolone (KENALOG) 0.1 % cream     No current facility-administered medications for this visit.        Social History     Tobacco Use     Smoking status: Former Smoker     Years: 2.00     Types: Cigarettes     Quit date: 4/10/2001     Years since quittin.0     Smokeless tobacco: Never Used   Substance Use Topics     Alcohol use: Yes     Alcohol/week: 2.0 standard drinks     Types: 2 Standard drinks or equivalent per week     Drug use: No       Health Maintenance Due   Topic Date Due     ADVANCE CARE PLANNING  Never done     HIV SCREENING  Never done     HEPATITIS C SCREENING  Never done     " PREVENTIVE CARE VISIT  05/07/2020     INFLUENZA VACCINE (1) 09/01/2020     HPV TEST  04/02/2021     PAP  04/02/2021       Lab Results   Component Value Date    PAP ASC-US 04/02/2018 April 7, 2021 1:56 PM

## 2021-04-07 NOTE — PATIENT INSTRUCTIONS
Trial of OTC omeprazole 20 mg daily.  Avoid fatty foods and antacids.  Follow up with me after the labs/ultrasound.  Please let me know if any worsening symptoms.      Patient Education     Epigastric Pain (Uncertain Cause)  Epigastric pain is pain in the upper abdomen. It can be a sign of disease. Common causes include:     Acid reflux (stomach acid flowing up into the esophagus)    Gastritis (irritation of the stomach lining) Most often this is from aspirin or NSAID medicines such as ibuprofen, bacteria called H. pylori, or frequent alcohol use.    Peptic ulcer disease    Inflammation of the pancreas    Gallstone    Infection in the gallbladder  Pain may be dull or burning. It may spread upward to the chest or to the back. There may be other symptoms such as belching, bloating, cramps or hunger pains. There may be weight loss or poor appetite, nausea or vomiting.   Since the cause of your pain is not certain yet, you may need more tests. Sometimes the doctor will treat you for the most likely condition to see if there is improvement before doing more tests.     Home care  Medicines    Antacids help neutralize the normal acids in your stomach. If you don t like the liquid, you can try a chewable one. You may find one works better than another for you. Overuse can cause diarrhea or constipation. Call your provider if you have questions about your medicines or concerns about side effects.    Acid blockers (H2 blockers) decrease acid production. Examples are cimetidine and famotidine.    Acid inhibitors (PPIs) decrease acid production in a different way than the blockers. You may find they work better, but can take a little longer to take effect.  Examples are omeprazole, lansoprazole, pantoprazole, rabeprazole, and esomeprazole. Many of these are available over-the-counter or available as generics.    Take an antacid 30 to 60 minutes after eating and at bedtime, but not at the same time as an acid blocker.    Try not  to take NSAIDs such as ibuprofen. Aspirin may also cause problems, but if taking it for your heart or other medical reasons, talk to your doctor before stopping it.  Diet    If certain foods seem to cause your pain, try not to eat them. Certain foods can worsen symptoms of gastritis. Limit or avoid fatty, fried, and spicy foods, as well as coffee, chocolate, mint, and foods with high acid content such as tomatoes and citrus fruit and juices (orange, grapefruit, lemon).    Eat slowly and chew food well before swallowing. Symptoms of gastritis can be worsened by certain foods.    Don't drink alcohol. It can irritate the stomach. If you have trouble giving up alcohol, ask your doctor for treatment resources.    Don't consume caffeine, or use tobacco. These can delay healing and worsen your problem.    Try eating smaller meals with snacks in between. Don't eat large meals before bedtime.    Keep an empty stomach for 2 to 3 hours before lying down.    Prop the head of the bed up if you have overnight symptoms. This helps acid clear from your esophagus.    Follow-up care  Follow up with your healthcare provider or as advised.  When to seek medical advice  Call your healthcare provider right away if any of the following occur:    Stomach pain worsens or moves to the right lower part of the abdomen    Chest pain appears, or if it worsens or spreads to the chest, back, neck, shoulder, or arm    Frequent vomiting (can t keep down liquids)    Blood in the stool or vomit (red or black color)    Feeling weak or dizzy, fainting, or having trouble breathing    Fever of 100.4 F (38 C) or higher, or as directed by your healthcare provider    Abdominal swelling    Worsening symptoms or new symptoms  [employer] last reviewed this educational content on 5/1/2020 2000-2020 [employer]. All rights reserved. This information is not intended as a substitute for professional medical care. Always follow your healthcare  professional's instructions.

## 2021-04-08 ENCOUNTER — ANCILLARY PROCEDURE (OUTPATIENT)
Dept: ULTRASOUND IMAGING | Facility: CLINIC | Age: 41
End: 2021-04-08
Attending: PHYSICIAN ASSISTANT
Payer: COMMERCIAL

## 2021-04-08 DIAGNOSIS — R10.13 ABDOMINAL PAIN, EPIGASTRIC: ICD-10-CM

## 2021-04-08 PROCEDURE — 76700 US EXAM ABDOM COMPLETE: CPT | Performed by: RADIOLOGY

## 2021-04-13 DIAGNOSIS — R10.13 ABDOMINAL PAIN, EPIGASTRIC: ICD-10-CM

## 2021-04-15 LAB — H PYLORI AG STL QL IA: NEGATIVE

## 2021-04-20 ENCOUNTER — ANCILLARY PROCEDURE (OUTPATIENT)
Dept: CARDIOLOGY | Facility: CLINIC | Age: 41
End: 2021-04-20
Attending: INTERNAL MEDICINE
Payer: COMMERCIAL

## 2021-04-20 DIAGNOSIS — I42.8 ARRHYTHMOGENIC RIGHT VENTRICULAR CARDIOMYOPATHY (H): ICD-10-CM

## 2021-04-20 DIAGNOSIS — Z95.818 STATUS POST PLACEMENT OF IMPLANTABLE LOOP RECORDER: ICD-10-CM

## 2021-04-20 PROCEDURE — G2066 INTER DEVC REMOTE 30D: HCPCS

## 2021-04-20 PROCEDURE — 93298 REM INTERROG DEV EVAL SCRMS: CPT | Performed by: INTERNAL MEDICINE

## 2021-08-16 ENCOUNTER — ANCILLARY PROCEDURE (OUTPATIENT)
Dept: CARDIOLOGY | Facility: CLINIC | Age: 41
End: 2021-08-16
Attending: INTERNAL MEDICINE
Payer: COMMERCIAL

## 2021-08-16 DIAGNOSIS — Z95.818 STATUS POST PLACEMENT OF IMPLANTABLE LOOP RECORDER: ICD-10-CM

## 2021-08-16 DIAGNOSIS — I42.8 ARRHYTHMOGENIC RIGHT VENTRICULAR CARDIOMYOPATHY (H): ICD-10-CM

## 2021-08-16 PROCEDURE — 93296 REM INTERROG EVL PM/IDS: CPT

## 2021-08-16 PROCEDURE — 93294 REM INTERROG EVL PM/LDLS PM: CPT | Performed by: INTERNAL MEDICINE

## 2021-09-21 DIAGNOSIS — E78.5 HYPERLIPIDEMIA LDL GOAL <70: ICD-10-CM

## 2021-09-21 RX ORDER — ATORVASTATIN CALCIUM 20 MG/1
20 TABLET, FILM COATED ORAL DAILY
Qty: 90 TABLET | Refills: 0 | Status: SHIPPED | OUTPATIENT
Start: 2021-09-21 | End: 2021-10-05

## 2021-10-04 NOTE — PROGRESS NOTES
SUBJECTIVE:   CC: {Maren Bee is an {41 year old woman who presents for preventive health visit.  Her last CPE was 2019. 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  Followed by cardiology and doing well.  Has a loop recorder. She has the following concerns she would like addressed today:    Hyperlipidemia Follow-Up    Are you regularly taking any medication or supplement to lower your cholesterol?   Yes- atorvastatin    Are you having muscle aches or other side effects that you think could be caused by your cholesterol lowering medication?  No    Hypothyroidism Follow-up    Since last visit, patient describes the following symptoms: Weight stable, no hair loss, no skin changes, no constipation, no loose stools  Due for labs and refills.    GYN history:  Periods: regular, lasting 5days.  Flow described as moderate  no dysmenorrhea, no Dysparuneia  Currently sexually active: yesa    Contraception: Vasectomy  {Patient's last menstrual period was 2021 (exact date).  Vaginal symptoms: no  Concern for STD: no    Accepts/requests STD testing: Has never been screened for HIV or Hep C  History of abnormal Pap smear: NO - age 28- 63 PAP every 3 years recommended    Health maintenance:  Mammogram: Mammogram  Colonoscopy: Up to date  PAP:   Lab Results   Component Value Date    PAP ASC-US 2018  HPV negative       Healthy Habits:    Do you get at least three servings of calcium containing foods daily (dairy, green leafy vegetables, etc.)? yes    Amount of exercise or daily activities, outside of work: 3 day(s) per week    Problems taking medications regularly No    Medication side effects: No    Have you had an eye exam in the past two years? yes    Do you see a dentist twice per year? yes    Do you have sleep apnea, excessive snoring or daytime drowsiness?no    PHQ-2 Score:     PHQ-2 (  [employer]) 10/5/2021 2021   Q1: Little interest or  pleasure in doing things 0 0   Q2: Feeling down, depressed or hopeless 0 0   PHQ-2 Score 0 0          Patient Active Problem List    Diagnosis Date Noted     Clinical diagnosis of COVID-19 11/10/2020     Priority: Medium     Positive test following illness and loss of taste and smell.  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         Past Medical History:   Diagnosis Date     Cardiomyopathy, familial (H) 7/18/2012    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      Hypothyroidism        Past Surgical History:   Procedure Laterality Date     DENTAL SURGERY       EP LOOP RECORDER IMPLANT N/A 1/24/2020    Procedure: EP LOOP RECORDER IMPLANT;  Surgeon: Jasbir Gordillo MD;  Location[facility]  [facility]     Strabismus Surgery         Family History   Problem Relation Age of Onset     Other - See Comments Mother 58        hyperlipidemia, hypothyroidism, ARVD gene +     Other - See Comments Father         hyperlipidemia, hypothyroidism     Other - See Comments Brother 32        (Amari) ARVD s/p sudden cardiac arrest     Cardiovascular Brother 30        (Young) MI at age 30; type 1 DM; ARVD gene negative;      Diabetes Type 1 Brother      Lupus Maternal Grandmother      Colon Cancer Maternal Grandmother 80     Cancer Maternal Grandfather         Bladder     Diabetes Daughter 3        type I     Other - See Comments  Other         Alcohol Abuse       Social History     Tobacco Use     Smoking status: Former Smoker     Years: 2.00     Types: Cigarettes     Quit date: 4/10/2001     Years since quittin.5     Smokeless tobacco: Never Used   Substance Use Topics     Alcohol use: Yes     Alcohol/week: 2.0 standard drinks     Types: 2 Standard drinks or equivalent per week       Social History     Social History Narrative    Lives with  and 2 daughter.  Ages 9 and 12  The 12 year old has type 1 diabetes. One cat.    Has a good support system.    Feels safe in all environments.    Wears seatbelt 100% of the time    Wears helmet while biking.    Denies history of abuse, past or present, physical, sexual or emotional.    16    [place] [employer] G[employer]            Lives with husnabd and 2 children age 14 and 11 One cat and hamster. Life is stressful right now.  Daughter is non-binary struggling a bit. Patient recently changed jobs.  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    Feels safe in all environments.    Wears seatbelt 100% of the time    Wears helmet while biking.    Regular dental visits: yes    Do you have glasses or contacts: yes    Have you seen an eye MD in the past 2 years: yes    Do you snore: yes.  Sleep apnea: none observed    Caffeine: Drinks per day: 2    Denies history of abuse, past or present, physical, sexual or emotional.                   Current Outpatient Medications   Medication Sig Dispense Refill     atorvastatin (LIPITOR) 20 MG tablet Take 1 tablet (20 mg) by mouth daily 90 tablet 3     Cholecalciferol (VITAMIN D3) 1000 units CAPS Take 1,000 Units by mouth       levothyroxine (SYNTHROID/LEVOTHROID) 200 MCG tablet Take 1 tablet (200 mcg) by mouth daily 90 tablet 3     metoprolol succinate ER (TOPROL-XL) 100 MG 24 hr tablet Take 1 tablet (100 mg) by mouth daily 90 tablet 0     metroNIDAZOLE (METROGEL) 1 % external gel  "Apply topically daily       Pramoxine-HC (HYDROCORTISONE ACE-PRAMOXINE) 2.5-1 % CREA Apply to affected area 2-3 times daily (Patient not taking: Reported on 4/7/2021) 57 g 1     triamcinolone (KENALOG) 0.1 % cream Apply topically 2 times daily (Patient not taking: Reported on 4/7/2021) 80 g 1                          Reviewed orders with patient.  Reviewed health maintenance and updated orders accordingly - Yes  Lab work is in process              ROS:  CONSTITUTIONAL: NEGATIVE for fever, chills, change in weight  INTEGUMENTARU/SKIN: NEGATIVE for worrisome rashes, moles or lesions  EYES: NEGATIVE for vision changes or irritation  ENT: NEGATIVE for ear, mouth and throat problems  RESP: NEGATIVE for significant cough or SOB  BREAST: NEGATIVE for masses, tenderness or discharge  CV: NEGATIVE for chest pain, palpitations or peripheral edema  GI: NEGATIVE for nausea, abdominal pain, heartburn, or change in bowel habits  : NEGATIVE for unusual urinary or vaginal symptoms. Periods are regular.  MUSCULOSKELETAL: NEGATIVE for significant arthralgias or myalgia  NEURO: NEGATIVE for weakness, dizziness or paresthesias  ENDOCRINE: NEGATIVE for temperature intolerance, skin/hair changes  HEME/ALLERGY/IMMUNE: NEGATIVE for bleeding problems  PSYCHIATRIC: NEGATIVE for changes in mood or affect    OBJECTIVE:   /68 (BP Location: Right arm, Patient Position: Sitting, Cuff Size: Adult Regular)   Pulse 60   Temp 97.2  F (36.2  C) (Skin)   Resp 12   Ht 1.67 m (5' 5.75\")   Wt 70.1 kg (154 lb 8 oz)   LMP 09/23/2021 (Exact Date)   SpO2 100%   Breastfeeding No   BMI 25.13 kg/m      EXAM:  GENERAL: healthy, alert and no distress  EYES: Eyes grossly normal to inspection, PERRL and conjunctivae and sclerae normal  HENT: ear canals and TM's normal, nose and mouth without ulcers or lesions  NECK: no adenopathy, no asymmetry, masses, or scars and thyroid normal to palpation. No bruits  RESP: lungs clear to auscultation - no rales, " rhonchi or wheezes  BREAST: normal without masses, tenderness or nipple discharge and no palpable axillary masses or adenopathy  CV: regular rate and rhythm, normal S1 S2, no S3 or S4, no murmur, click or rub, no peripheral edema and peripheral pulses strong  ABDOMEN: soft, nontender, no hepatosplenomegaly, no masses and bowel sounds normal   (female): normal female external genitalia, normal urethral meatus, vaginal mucosa pink, moist, well rugated, and normal cervix/adnexa/uterus without masses or discharge.  Cervix easily friable  MS: no gross musculoskeletal defects noted, no clubbing, edema or cyanosis of extremities.  Pulses = and appropriate bilaterally to DP and PT  SKIN: no suspicious lesions or rashes  NEURO: Normal strength and tone, mentation intact and speech normal  PSYCH: mentation appears normal, affect normal/bright  LYMPH: no cervical, supraclavicular, axillary, or inguinal adenopathy      ASSESSMENT/PLAN:     (Z00.00) Routine general medical examination at a health care facility  (primary encounter diagnosis)  Plan: Hepatitis C Screen Reflex to HCV RNA Quant and         Genotype, Comprehensive metabolic panel,         Gynecologic Cytology (PAP Smear)        (E78.5) Hyperlipidemia LDL goal <70  Plan: atorvastatin (LIPITOR) 20 MG tablet,         Comprehensive metabolic panel    (E03.9) Acquired hypothyroidism  Plan: TSH with free T4 reflex, levothyroxine         (SYNTHROID/LEVOTHROID) 200 MCG tablet, T4 free        Will make recommendations as indicated by lab results.    (I42.8) Arrhythmogenic left ventricular dysplasia (H)  Comment: Followed by cardiology  Plan: metoprolol succinate ER (TOPROL-XL) 100 MG 24         hr tablet        Doing well    (Z12.4) Screening for cervical cancer  Plan: Gynecologic Cytology (PAP Smear)            (Z11.3) Screen for STD (sexually transmitted disease)  Plan: HIV Antigen Antibody Combo            (Z23) Flu vaccine need  Plan: INFLUENZA VACCINE IM > 6 MONTHS  HERMELINDO IIV4         (AFLURIA/FLUZONE)                COUNSELING:   Reviewed preventive health counseling, as reflected in patient instructions    BP Readings from Last 3 Encounters:   10/05/21 107/68   04/07/21 104/68   01/22/21 106/64      Body mass index is 25.13 kg/m .            History   Smoking Status     Former Smoker     Years: 2.00     Types: Cigarettes     Quit date: 4/10/2001   Smokeless Tobacco     Never Used            Counseling Resources:  ATP IV Guidelines  Pooled Cohorts Equation Calculator  Breast Cancer Risk Calculator  FRAX Risk Assessment  ICSI Preventive Guidelines  Dietary Guidelines for Americans, 2010  [employer] My[employer]ate  ASA Prophylaxis  Lung CA Screening    Betty Ashraf PA-C  [facility]

## 2021-10-05 ENCOUNTER — OFFICE VISIT (OUTPATIENT)
Dept: FAMILY MEDICINE | Facility: CLINIC | Age: 41
End: 2021-10-05
Payer: COMMERCIAL

## 2021-10-05 VITALS
DIASTOLIC BLOOD PRESSURE: 68 MMHG | HEIGHT: 66 IN | BODY MASS INDEX: 24.83 KG/M2 | HEART RATE: 60 BPM | TEMPERATURE: 97.2 F | WEIGHT: 154.5 LBS | OXYGEN SATURATION: 100 % | RESPIRATION RATE: 12 BRPM | SYSTOLIC BLOOD PRESSURE: 107 MMHG

## 2021-10-05 DIAGNOSIS — Z11.3 SCREEN FOR STD (SEXUALLY TRANSMITTED DISEASE): ICD-10-CM

## 2021-10-05 DIAGNOSIS — I42.8 ARRHYTHMOGENIC LEFT VENTRICULAR DYSPLASIA (H): ICD-10-CM

## 2021-10-05 DIAGNOSIS — Z12.4 SCREENING FOR CERVICAL CANCER: ICD-10-CM

## 2021-10-05 DIAGNOSIS — E03.9 ACQUIRED HYPOTHYROIDISM: Primary | ICD-10-CM

## 2021-10-05 DIAGNOSIS — Z23 FLU VACCINE NEED: ICD-10-CM

## 2021-10-05 DIAGNOSIS — Z00.00 ROUTINE GENERAL MEDICAL EXAMINATION AT A HEALTH CARE FACILITY: Primary | ICD-10-CM

## 2021-10-05 DIAGNOSIS — E78.5 HYPERLIPIDEMIA LDL GOAL <70: ICD-10-CM

## 2021-10-05 DIAGNOSIS — E03.9 ACQUIRED HYPOTHYROIDISM: ICD-10-CM

## 2021-10-05 LAB
ALBUMIN SERPL-MCNC: 4.1 G/DL (ref 3.4–5)
ALP SERPL-CCNC: 70 U/L (ref 40–150)
ALT SERPL W P-5'-P-CCNC: 22 U/L (ref 0–50)
ANION GAP SERPL CALCULATED.3IONS-SCNC: 5 MMOL/L (ref 3–14)
AST SERPL W P-5'-P-CCNC: 16 U/L (ref 0–45)
BILIRUB SERPL-MCNC: 0.7 MG/DL (ref 0.2–1.3)
BUN SERPL-MCNC: 11 MG/DL (ref 7–30)
CALCIUM SERPL-MCNC: 9.5 MG/DL (ref 8.5–10.1)
CHLORIDE BLD-SCNC: 106 MMOL/L (ref 94–109)
CHOLEST SERPL-MCNC: 157 MG/DL
CO2 SERPL-SCNC: 28 MMOL/L (ref 20–32)
CREAT SERPL-MCNC: 0.72 MG/DL (ref 0.52–1.04)
FASTING STATUS PATIENT QL REPORTED: YES
GFR SERPL CREATININE-BSD FRML MDRD: >90 ML/MIN/1.73M2
GLUCOSE BLD-MCNC: 92 MG/DL (ref 70–99)
HCV AB SERPL QL IA: NONREACTIVE
HDLC SERPL-MCNC: 63 MG/DL
HIV 1+2 AB+HIV1 P24 AG SERPL QL IA: NONREACTIVE
LDLC SERPL CALC-MCNC: 85 MG/DL
NONHDLC SERPL-MCNC: 94 MG/DL
POTASSIUM BLD-SCNC: 4.4 MMOL/L (ref 3.4–5.3)
PROT SERPL-MCNC: 7.6 G/DL (ref 6.8–8.8)
SODIUM SERPL-SCNC: 139 MMOL/L (ref 133–144)
T4 FREE SERPL-MCNC: 1.66 NG/DL (ref 0.76–1.46)
TRIGL SERPL-MCNC: 46 MG/DL
TSH SERPL DL<=0.005 MIU/L-ACNC: 0.04 MU/L (ref 0.4–4)

## 2021-10-05 PROCEDURE — 80053 COMPREHEN METABOLIC PANEL: CPT | Performed by: PHYSICIAN ASSISTANT

## 2021-10-05 PROCEDURE — 87389 HIV-1 AG W/HIV-1&-2 AB AG IA: CPT | Performed by: PHYSICIAN ASSISTANT

## 2021-10-05 PROCEDURE — 86803 HEPATITIS C AB TEST: CPT | Performed by: PHYSICIAN ASSISTANT

## 2021-10-05 PROCEDURE — G0145 SCR C/V CYTO,THINLAYER,RESCR: HCPCS | Performed by: PHYSICIAN ASSISTANT

## 2021-10-05 PROCEDURE — 82465 ASSAY BLD/SERUM CHOLESTEROL: CPT | Performed by: PHYSICIAN ASSISTANT

## 2021-10-05 PROCEDURE — 84443 ASSAY THYROID STIM HORMONE: CPT | Performed by: PHYSICIAN ASSISTANT

## 2021-10-05 RX ORDER — ATORVASTATIN CALCIUM 20 MG/1
20 TABLET, FILM COATED ORAL DAILY
Qty: 90 TABLET | Refills: 3 | Status: SHIPPED | OUTPATIENT
Start: 2021-10-05 | End: 2022-12-07

## 2021-10-05 RX ORDER — METOPROLOL SUCCINATE 100 MG/1
100 TABLET, EXTENDED RELEASE ORAL DAILY
Qty: 90 TABLET | Refills: 0 | Status: SHIPPED | OUTPATIENT
Start: 2021-10-05 | End: 2022-01-07

## 2021-10-05 RX ORDER — METRONIDAZOLE 10 MG/G
GEL TOPICAL DAILY
COMMUNITY
End: 2022-10-20

## 2021-10-05 RX ORDER — LEVOTHYROXINE SODIUM 200 UG/1
200 TABLET ORAL DAILY
Qty: 90 TABLET | Refills: 3 | Status: SHIPPED | OUTPATIENT
Start: 2021-10-05 | End: 2023-02-21

## 2021-10-05 ASSESSMENT — MIFFLIN-ST. JEOR: SCORE: 1368.56

## 2021-10-05 NOTE — NURSING NOTE
"41 year old  Chief Complaint   Patient presents with     Physical     Hearing Problem     is asking people to repeat themselves a lot more often       Blood pressure 107/68, pulse 60, temperature 97.2  F (36.2  C), temperature source Skin, resp. rate 12, height 1.67 m (5' 5.75\"), weight 70.1 kg (154 lb 8 oz), last menstrual period 2021, SpO2 100 %, not currently breastfeeding. Body mass index is 25.13 kg/m .  Patient Active Problem List   Diagnosis     Cardiomyopathy, familial (H)     Contact dermatitis     Keratosis pilaris     Arrhythmogenic left ventricular dysplasia (H)     Hypothyroidism     Hyperlipidemia LDL goal <130     Arrhythmogenic right ventricular cardiomyopathy (H)     Clinical diagnosis of COVID-19       Wt Readings from Last 2 Encounters:   10/05/21 70.1 kg (154 lb 8 oz)   21 74.4 kg (164 lb 1.9 oz)     BP Readings from Last 3 Encounters:   10/05/21 107/68   21 104/68   21 106/64         Current Outpatient Medications   Medication     atorvastatin (LIPITOR) 20 MG tablet     Cholecalciferol (VITAMIN D3) 1000 units CAPS     levothyroxine (SYNTHROID/LEVOTHROID) 200 MCG tablet     metoprolol succinate ER (TOPROL-XL) 100 MG 24 hr tablet     metroNIDAZOLE (METROGEL) 1 % external gel     diltiazem 2% in PLO gel     hydrocortisone (ANUSOL-HC) 25 MG suppository     Pramoxine-HC (HYDROCORTISONE ACE-PRAMOXINE) 2.5-1 % CREA     triamcinolone (KENALOG) 0.1 % cream     No current facility-administered medications for this visit.       Social History     Tobacco Use     Smoking status: Former Smoker     Years: 2.00     Types: Cigarettes     Quit date: 4/10/2001     Years since quittin.5     Smokeless tobacco: Never Used   Vaping Use     Vaping Use: Never used   Substance Use Topics     Alcohol use: Yes     Alcohol/week: 2.0 standard drinks     Types: 2 Standard drinks or equivalent per week     Drug use: No       Health Maintenance Due   Topic Date Due     ADVANCE CARE PLANNING  Never " done     HIV SCREENING  Never done     HEPATITIS C SCREENING  Never done     HPV TEST  04/02/2021     PAP  04/02/2021     LIPID  05/26/2021     TSH W/FREE T4 REFLEX  05/26/2021     INFLUENZA VACCINE (1) 09/01/2021       Lab Results   Component Value Date    PAP ASC-US 04/02/2018 October 5, 2021 8:31 AM

## 2021-10-07 LAB
BKR LAB AP GYN ADEQUACY: NORMAL
BKR LAB AP GYN INTERPRETATION: NORMAL
BKR LAB AP HPV REFLEX: NORMAL
BKR LAB AP LMP: NORMAL
BKR LAB AP PREVIOUS ABNL DX: NORMAL
BKR LAB AP PREVIOUS ABNORMAL: NORMAL
PATH REPORT.COMMENTS IMP SPEC: NORMAL
PATH REPORT.RELEVANT HX SPEC: NORMAL

## 2021-10-11 LAB
HUMAN PAPILLOMA VIRUS 16 DNA: NEGATIVE
HUMAN PAPILLOMA VIRUS 18 DNA: NEGATIVE
HUMAN PAPILLOMA VIRUS FINAL DIAGNOSIS: NORMAL
HUMAN PAPILLOMA VIRUS OTHER HR: NEGATIVE

## 2021-11-23 ENCOUNTER — ANCILLARY PROCEDURE (OUTPATIENT)
Dept: CARDIOLOGY | Facility: CLINIC | Age: 41
End: 2021-11-23
Attending: INTERNAL MEDICINE
Payer: COMMERCIAL

## 2021-11-23 DIAGNOSIS — Z95.818 STATUS POST PLACEMENT OF IMPLANTABLE LOOP RECORDER: ICD-10-CM

## 2021-11-23 DIAGNOSIS — I42.8 ARRHYTHMOGENIC RIGHT VENTRICULAR CARDIOMYOPATHY (H): ICD-10-CM

## 2021-11-23 PROCEDURE — G2066 INTER DEVC REMOTE 30D: HCPCS

## 2021-11-23 PROCEDURE — 93298 REM INTERROG DEV EVAL SCRMS: CPT | Performed by: INTERNAL MEDICINE

## 2021-12-09 ENCOUNTER — PATIENT OUTREACH (OUTPATIENT)
Dept: SURGERY | Facility: CLINIC | Age: 41
End: 2021-12-09
Payer: COMMERCIAL

## 2021-12-09 DIAGNOSIS — K60.2 ANAL FISSURE: Primary | ICD-10-CM

## 2021-12-09 NOTE — PROGRESS NOTES
Maren left me a voicemail stating that she started having recurrent anal fissure symptoms.  She stated that she was on diltiazem for a couple months at the beginning of the year which helped at that time but now she has recurrent symptoms.  Discussed with Betty.  Told patient to restart diltiazem and make sure if she is taking stool softeners as needed for constipation.  Scheduled follow-up in 8 weeks to make sure she is fully healed and/or discuss need for Botox.  Patient agrees with the plan.

## 2022-01-04 DIAGNOSIS — I42.8 ARRHYTHMOGENIC RIGHT VENTRICULAR CARDIOMYOPATHY (H): ICD-10-CM

## 2022-01-04 DIAGNOSIS — I42.9 CARDIOMYOPATHY, FAMILIAL (H): ICD-10-CM

## 2022-01-04 DIAGNOSIS — I42.8 ARRHYTHMOGENIC LEFT VENTRICULAR DYSPLASIA (H): Primary | ICD-10-CM

## 2022-01-07 ENCOUNTER — OFFICE VISIT (OUTPATIENT)
Dept: CARDIOLOGY | Facility: CLINIC | Age: 42
End: 2022-01-07
Attending: INTERNAL MEDICINE
Payer: COMMERCIAL

## 2022-01-07 ENCOUNTER — LAB (OUTPATIENT)
Dept: LAB | Facility: CLINIC | Age: 42
End: 2022-01-07
Attending: INTERNAL MEDICINE
Payer: COMMERCIAL

## 2022-01-07 ENCOUNTER — CARE COORDINATION (OUTPATIENT)
Dept: CARDIOLOGY | Facility: CLINIC | Age: 42
End: 2022-01-07

## 2022-01-07 VITALS
DIASTOLIC BLOOD PRESSURE: 75 MMHG | BODY MASS INDEX: 25.14 KG/M2 | HEART RATE: 126 BPM | OXYGEN SATURATION: 99 % | HEIGHT: 65 IN | WEIGHT: 150.9 LBS | SYSTOLIC BLOOD PRESSURE: 119 MMHG

## 2022-01-07 DIAGNOSIS — I42.8 ARRHYTHMOGENIC RIGHT VENTRICULAR CARDIOMYOPATHY (H): ICD-10-CM

## 2022-01-07 DIAGNOSIS — I42.8 ARRHYTHMOGENIC LEFT VENTRICULAR DYSPLASIA (H): ICD-10-CM

## 2022-01-07 DIAGNOSIS — I42.9 CARDIOMYOPATHY, FAMILIAL (H): ICD-10-CM

## 2022-01-07 LAB
ALBUMIN SERPL-MCNC: 4.2 G/DL (ref 3.4–5)
ALP SERPL-CCNC: 72 U/L (ref 40–150)
ALT SERPL W P-5'-P-CCNC: 21 U/L (ref 0–50)
ANION GAP SERPL CALCULATED.3IONS-SCNC: 5 MMOL/L (ref 3–14)
AST SERPL W P-5'-P-CCNC: 10 U/L (ref 0–45)
BASOPHILS # BLD AUTO: 0.1 10E3/UL (ref 0–0.2)
BASOPHILS NFR BLD AUTO: 1 %
BILIRUB SERPL-MCNC: 0.3 MG/DL (ref 0.2–1.3)
BUN SERPL-MCNC: 12 MG/DL (ref 7–30)
CALCIUM SERPL-MCNC: 9.3 MG/DL (ref 8.5–10.1)
CHLORIDE BLD-SCNC: 106 MMOL/L (ref 94–109)
CO2 SERPL-SCNC: 28 MMOL/L (ref 20–32)
CREAT SERPL-MCNC: 0.68 MG/DL (ref 0.52–1.04)
EOSINOPHIL # BLD AUTO: 0.1 10E3/UL (ref 0–0.7)
EOSINOPHIL NFR BLD AUTO: 1 %
ERYTHROCYTE [DISTWIDTH] IN BLOOD BY AUTOMATED COUNT: 13.3 % (ref 10–15)
GFR SERPL CREATININE-BSD FRML MDRD: >90 ML/MIN/1.73M2
GLUCOSE BLD-MCNC: 86 MG/DL (ref 70–99)
HCT VFR BLD AUTO: 42.2 % (ref 35–47)
HGB BLD-MCNC: 13.5 G/DL (ref 11.7–15.7)
IMM GRANULOCYTES # BLD: 0.1 10E3/UL
IMM GRANULOCYTES NFR BLD: 1 %
LYMPHOCYTES # BLD AUTO: 1.5 10E3/UL (ref 0.8–5.3)
LYMPHOCYTES NFR BLD AUTO: 23 %
MCH RBC QN AUTO: 29 PG (ref 26.5–33)
MCHC RBC AUTO-ENTMCNC: 32 G/DL (ref 31.5–36.5)
MCV RBC AUTO: 91 FL (ref 78–100)
MDC_IDC_MSMT_BATTERY_STATUS: NORMAL
MDC_IDC_PG_IMPLANT_DTM: NORMAL
MDC_IDC_PG_MFG: NORMAL
MDC_IDC_PG_MODEL: NORMAL
MDC_IDC_PG_SERIAL: NORMAL
MDC_IDC_PG_TYPE: NORMAL
MDC_IDC_SESS_CLINIC_NAME: NORMAL
MDC_IDC_SESS_DTM: NORMAL
MDC_IDC_SESS_TYPE: NORMAL
MDC_IDC_SET_ZONE_DETECTION_INTERVAL: 2000 MS
MDC_IDC_SET_ZONE_DETECTION_INTERVAL: 3000 MS
MDC_IDC_SET_ZONE_DETECTION_INTERVAL: 320 MS
MDC_IDC_SET_ZONE_TYPE: NORMAL
MDC_IDC_STAT_AT_BURDEN_PERCENT: 0 %
MDC_IDC_STAT_AT_DTM_END: NORMAL
MDC_IDC_STAT_AT_DTM_START: NORMAL
MDC_IDC_STAT_EPISODE_RECENT_COUNT: 0
MDC_IDC_STAT_EPISODE_RECENT_COUNT_DTM_END: NORMAL
MDC_IDC_STAT_EPISODE_RECENT_COUNT_DTM_START: NORMAL
MDC_IDC_STAT_EPISODE_TOTAL_COUNT: 0
MDC_IDC_STAT_EPISODE_TOTAL_COUNT: 1
MDC_IDC_STAT_EPISODE_TOTAL_COUNT_DTM_END: NORMAL
MDC_IDC_STAT_EPISODE_TOTAL_COUNT_DTM_START: NORMAL
MDC_IDC_STAT_EPISODE_TYPE: NORMAL
MONOCYTES # BLD AUTO: 0.7 10E3/UL (ref 0–1.3)
MONOCYTES NFR BLD AUTO: 11 %
NEUTROPHILS # BLD AUTO: 4.2 10E3/UL (ref 1.6–8.3)
NEUTROPHILS NFR BLD AUTO: 63 %
NRBC # BLD AUTO: 0 10E3/UL
NRBC BLD AUTO-RTO: 0 /100
PLATELET # BLD AUTO: 334 10E3/UL (ref 150–450)
POTASSIUM BLD-SCNC: 4 MMOL/L (ref 3.4–5.3)
PROT SERPL-MCNC: 7.8 G/DL (ref 6.8–8.8)
RBC # BLD AUTO: 4.65 10E6/UL (ref 3.8–5.2)
SODIUM SERPL-SCNC: 139 MMOL/L (ref 133–144)
WBC # BLD AUTO: 6.6 10E3/UL (ref 4–11)

## 2022-01-07 PROCEDURE — 99214 OFFICE O/P EST MOD 30 MIN: CPT | Mod: 25 | Performed by: INTERNAL MEDICINE

## 2022-01-07 PROCEDURE — 93291 INTERROG DEV EVAL SCRMS IP: CPT | Performed by: INTERNAL MEDICINE

## 2022-01-07 PROCEDURE — 36415 COLL VENOUS BLD VENIPUNCTURE: CPT | Performed by: PATHOLOGY

## 2022-01-07 PROCEDURE — G0463 HOSPITAL OUTPT CLINIC VISIT: HCPCS

## 2022-01-07 RX ORDER — METOPROLOL SUCCINATE 100 MG/1
100 TABLET, EXTENDED RELEASE ORAL DAILY
Qty: 90 TABLET | Refills: 3 | Status: SHIPPED | OUTPATIENT
Start: 2022-01-07 | End: 2022-07-01

## 2022-01-07 ASSESSMENT — PAIN SCALES - GENERAL: PAINLEVEL: NO PAIN (0)

## 2022-01-07 ASSESSMENT — MIFFLIN-ST. JEOR: SCORE: 1339.74

## 2022-01-07 NOTE — LETTER
1/7/2022      RE: Maren Bee  [address]  [facility] [address]       Dear Colleague,    Thank you for the opportunity to participate in the care of your patient, Maren Bee, at the [facility] HEART [facility]INIC [place] at [facility]. Please see a copy of my visit note below.    HPI:  40 yo female with arrhythmogenic cardiomyopathy with (frameshift [id number]) presents for ongoing evaluation and management.   Overall patient reports that she has been feeling well.  She denies any chest pain or pressure, sob/iniguez, orthopnea, pnd, palpitations, syncope/presyncope or ozzy.  She continues to be active walking and playing pickle ball but continues to adhere to her exercise restrictions.  She denies any problems with her medications and reports compliance.  CARDIAC HISTORY: Pt's brother suffered sudden cardiac death in 2012 while playing basketball - he was successfully resuscitated with AED. 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  After patient was found to be mutation positive she underwent cardiac MRI in 2012 which did not reveal any significant findings for right sided involvement of ARVC.  Holter was also unremarkable.  Later patient's mother had MRI and also found to have left-sided (septal) involvement of ARVC.  Given this in addition to carefully examining cardiac MRI from 2015 for left sided involvement of ARVC, past MRI was also reexamined and patient was noted to also have left-sided involvement of ARVC.     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      Hypothyroidism    Hyperlipidemia         PAST SURGICAL HISTORY:  Past Surgical History:   Procedure Laterality Date     DENTAL SURGERY       EP LOOP RECORDER IMPLANT N/A 1/24/2020     "Procedure: EP LOOP RECORDER IMPLANT;  Surgeon: Jasbir Gordillo MD;  Location:  HEART CARDIAC CATH LAB     Strabismus Surgery         ALLERGIES   No Known Allergies    FAMILY HISTORY:  Family History   Problem Relation Age of Onset     Other - See Comments Mother 58        hyperlipidemia, hypothyroidism, ARVD gene +     Other - See Comments Father         hyperlipidemia, hypothyroidism     Other - See Comments Brother 32        (Amari) ARVD s/p sudden cardiac arrest     Cardiovascular Brother 30        (Young) MI at age 30; type 1 DM; ARVD gene negative;      Diabetes Type 1 Brother      Lupus Maternal Grandmother      Colon Cancer Maternal Grandmother 80     Cancer Maternal Grandfather         Bladder     Diabetes Daughter 3        type I     Other - See Comments Other         Alcohol Abuse     MEDICATIONS:  atorvastatin (LIPITOR) 20 MG tablet, Take 1 tablet (20 mg) by mouth daily  Cholecalciferol (VITAMIN D3) 1000 units CAPS, Take 1,000 Units by mouth  diltiazem 2% in PLO gel, To anal opening three times daily.  Use a pea-sized amount.  Store at room temperature.  levothyroxine (SYNTHROID/LEVOTHROID) 200 MCG tablet, Take 1 tablet (200 mcg) by mouth daily  metroNIDAZOLE (METROGEL) 1 % external gel, Apply topically daily    No current facility-administered medications on file prior to visit.  metoprolol xl 100mg daily      EXAM:  /75 (BP Location: Right arm, Patient Position: Chair, Cuff Size: Adult Regular)   Pulse (!) 126   Ht 1.65 m (5' 4.96\")   Wt 68.4 kg (150 lb 14.4 oz)   SpO2 99%   BMI 25.14 kg/m    General: appears comfortable, alert and articulate  Head: normocephalic, atraumatic  Eyes: anicteric sclera, EOMI  Neck: no adenopathy, 2+ carotids without bruits  Orophyarynx: moist mucosa, no lesions  Heart: regular, S1/S2, no murmur, gallop, rub, estimated JVP 6-7cm  Lungs: clear, no rales or wheezing  Abdomen: soft, non-tender, bowel sounds present, no hepatomegaly appreciated   Extremities: no " clubbing, cyanosis or pedal edema.  Warm, well perfused  Neurological: normal speech and affect, no gross motor deficits    Labs:  LIPID RESULTS:  Lab Results   Component Value Date    CHOL 157 10/05/2021    CHOL 194.0 05/26/2020    HDL 63 10/05/2021    HDL 71.0 05/26/2020    LDL 85 10/05/2021    .0 05/26/2020    LDL 74 07/30/2013    TRIG 46 10/05/2021    TRIG 70.0 05/26/2020    CHOLHDLRATIO 2.7 05/26/2020     CBC RESULTS:  Lab Results   Component Value Date    WBC 6.6 01/07/2022    WBC 6.3 06/30/2020    RBC 4.65 01/07/2022    RBC 4.46 06/30/2020    HGB 13.5 01/07/2022    HGB 13.0 04/07/2021    HCT 42.2 01/07/2022    HCT 40.6 04/07/2021    MCV 91 01/07/2022    MCV 90.8 04/07/2021    MCH 29.0 01/07/2022    MCH 29.1 04/07/2021    MCHC 32.0 01/07/2022    MCHC 32.0 04/07/2021    RDW 13.3 01/07/2022    RDW 12.4 04/07/2021     01/07/2022     06/30/2020       BMP RESULTS:  Lab Results   Component Value Date     01/07/2022     04/07/2021    POTASSIUM 4.0 01/07/2022    POTASSIUM 4.1 04/07/2021    CHLORIDE 106 01/07/2022    CHLORIDE 104 04/07/2021    CO2 28 01/07/2022    CO2 27 04/07/2021    ANIONGAP 5 01/07/2022    ANIONGAP 6 04/07/2021    GLC 86 01/07/2022    GLC 81 04/07/2021    BUN 12 01/07/2022    BUN 14 04/07/2021    CR 0.68 01/07/2022    CR 0.74 04/07/2021    GFRESTIMATED >90 01/07/2022    GFRESTIMATED >90 04/07/2021    GFRESTBLACK >90 04/07/2021    ADINA 9.3 01/07/2022    ADINA 9.4 04/07/2021        Procedures:    Holter June 2013:  No significant arrythmias    Cardiac MRI 6/2013: Impressions:  1. Normal left-ventricular size and global function (LVEF 57%).   There were no regional wall- motion abnormalities.   2. Normal right-ventricular size with [normal right-ventricular systolic function (RVEF 55%). There are no major or minor morphological criteria for ARVC (Per the revised criteria (2010).   3. Normal resting perfusion study. There was no late gadolinium enhancement to suggest prior  infarct, inflammation, or infiltration.   4. Sub optimal quality of study due to susceptibility and RF artifacts   5. Compared to cardiac MRI dated 04/2012 there appears no change in RV/LV function.    CARDIAC MRI: 7/1/14   IMPRESSION:   1. Normal left ventricular size and systolic function with a calculated ejection fraction of 55 %.   2. Normal right ventricular size and systolic function with a calculated ejection fraction of 56%. There are no major or minor morphological criteria for ARVC (Per the revised criteria (2010).   3. On delayed enhancement imaging, there is no abnormal hyperenhancement to suggest myocardial scar/inflammation/infiltration In comparison with prior study dated 6/10/2013, no significant changes are noted.    Examination: MR CARDIAC W CONTRAST 12/4/2015   Indication: 35-year-old female with family history of ARVC  Comparison: Cardiac MRIs of 07/01/2014, 06/10/2013 and 04/05/2012  IMPRESSION:  Arrhythmogenic cardiomyopathy with left ventricular involvement based  on tissue characterization by delayed enhancement imaging, and no  right ventricular involvement based on 2010 Task Force criteria.  Review of prior cardiac MRIs shows the same areas of hyperenhancement  dating back to 04/05/2012 with no obvious changes in the interim period.    EKG 12/15/15:  Normal sinus rhyhtm. Sinus arrhythmia.  Unchanged from prior.    Holter 12/2015      Zio-monitor 6/15/18      CMR Report 06-                              Clinical history: 37-year-old female with arrhythmogenic cardiomyopathy and new symptoms of palpitations.  Comparison CMRs: 04/05/2012, 06/10/2013, 07/01/2014 and 12/04/2015  1. The LV is normal in cavity size and wall thickness. The global systolic function is mildly reduced. The  LVEF is 52%. There are no regional wall motion abnormalities.  2. The RV is normal in cavity size. The global systolic function is normal. The RVEF is 61%. There is no regional RV akinesia, dyskinesia or  dyssynchronous RV contraction. There are no findings to satisfy CMR criteria for the diagnosis of ARVC as per the 2010 Task Force criteria.  3. Both atria are normal in size.  4. There is no significant valvular disease.   5. Late gadolinium enhancement imaging shows epicardial hyperenhancement of the right ventricular aspect of the mid inferoseptal segment, mid-myocardial hyperenhancement of the apical septal segment and epicardial hyperenhancement of the mid anterolateral segment. These match the areas of fatty replacement on other imaging.  6. Regadenoson perfusion imaging shows no ischemia.  7. There is no pericardial effusion or thickening.  CONCLUSIONS: Arrhythmogenic cardiomyopathy with left ventricular involvement based on tissue  characterization by delayed enhancement imaging, and no right ventricular involvement based on 2010 Task Force criteria. There have been no changes when compared to the prior studies dating back to 04/05/2012. No ischemia.    Cardiopulmonary Stress Test (7/2018):               Zio-monitor 6/3/2019: (2 days 11 hours)        Loop Monitor Interrogation 1/7/22  Device: Good Photo LNQ11 Reveal LINQ  Normal Device Function  Intrinsic Rhythm: VS @ 70 bpm  Patient Activated Episodes: 0  Tachy Episodes: 0  Pause Episodes: 0  Husam Episodes: 0  AF Episodes: 0  Loop recorder battery status = Good  Setting Changes: None      Assessment and Plan:  42 yo female with arrhythmogenic cardiomyopathy with (frameshift [id number]) presents for ongoing evaluation and management.    # Arrhythmogenic cardiomyopathy (frameshift [id number]):   - pt continues to be asymptomatic from a cardiac standpoint with stable exercise capacity.  Her last cardiac MRI in 2018 confirmed normal biventricular function with unchanged left ventricular involvement based on tissue  characterization by delayed enhancement imaging, and no right ventricular involvement.  She had loop monitor placed on 1/24/20 and has had no  significant arrhythmias detected.  Have previously had extensive discussion with patient about pathophysiology of arrhythmogenic cardiomyopathy.   To try and reduce arrhythmia risk disease progression will continue beta-blockage therapy with Metoprolol  mg daily.  Reviewed with patient recommendations regarding exercise safety with arrhythmogenic cardiomyopathy.  Recommended that patient be able to comfortably speak 5-7 words at peak exercise and keep HR < 85% max predicted HR, thus for patient < 150 bpm, during exercise.  Also discussed with patient the need to avoid all stimulants including Sudafed and all ephedrine containing products as well as methamphetamines and cocaine and limit alcohol intake to less than XX drinks per week..  Pt again reminded that all first degree relatives should be screened for arrhythmogenic cardiomyopathy with cardiac MRI, EKG and Holter/Zio monitor.  If family members decide to pursue genetic testing, then clinical screening would be indicated in gene positive, not gene negative, individuals.  Pt educated that any syncopal episode is considered a medical emergency requiring ER visit/evaluaion.    Follow-up:  Follow-up up with Dr. Walter in 6 months with a cardiac MRI, CPX and EKG. Follow up with Dr. shea and Dr. Walter in 2.5 years with echo, labs, device, and EKG.  Will be happy to see sooner if change in clinical status or new questions/concerns arise.    Haylie Medrano MD  Section Head - Advanced Heart Failure, Transplantation and Mechanical Circulatory Support  Director - Adult Congenital and Cardiovascular Genetics Center  Associate Professor of Medicine, [facility]    I spent a total of 30 minutes today with the patient personally reviewing recent cardiac testing and/or laboratory results, today's history and examination, and discussion and counseling with the patient.      Please do not hesitate to contact me if you have any questions/concerns.      Sincerely,     Haylie Medrano MD

## 2022-01-07 NOTE — PATIENT INSTRUCTIONS
You were seen today in the Adult Congenital and Cardiovascular Genetics Clinic at the [facility].    Cardiology Providers you saw during your visit:  Haylie Medrano MD    Diagnosis:  ARVC    Results:  Haylie Medrano MD reviewed the results of your lab and device testing today in clinic.    Recommendations:    1. Continue to eat a heart healthy, low salt diet.  2. Continue to get 20-30 minutes of aerobic activity, 4-5 days per week.  Examples of aerobic activity include walking, running, swimming, cycling, etc.  3. Continue to observe good oral hygiene, with regular dental visits.  4. Refrain from stimulant use such as Sudafed or any illicit drugs such as cocaine or heroin.  5. 5.  It is recommended that you limit weekly alcohol intake: 7 drinks a week for males and 3-4 drinks a week for females  6. 6.  Please present to the ER to be evaluated for any syncopal or near syncopal episodes.      CardioPulmonary Stress Test (CPX)  CPX is a maximal (meaning you exercise to exhaustion, not to achieve a heart rate) exercise test where we measure how well your heart/body uses oxygen or energy. It is the gold standard for measuring functional capacity and helps us differentiate limitations due to lungs, heart, or fitness.   A CPX is NOT a typical stress test. You will NOT be asked to hold your Beta Blocker medication.   You will be scheduled for a CardioPulmonary Stress Test at the [facility] ([address], [facility] [address], [phone]).     Follow these instructions:    1. Report to the GOLD waiting room in the [facility] hospital.  2. Nothing to eat for 3 hours prior to your test. You may have clear liquids up to the time of your test  3. Please wear loose, two-piece clothing and comfortable, rubber soled shoes for walking     For Patients with Diabetes: Your RN Coordinator will give you instructions on adjusting your diabetic medications for the day of your test                           If  you have questions please contact your diabetic care team                           Remember to  bring your glucometer & insulin with you to take after your test if needed    Cardiac MRI  Magnetic resonance imaging (MRI) is a test that lets your doctor see detailed pictures of the inside of your body. MRI combines the use of strong magnets and radio waves to form an MRI image. A Cardiac MRI will give a detailed image of your heart.  Follow these instructions:  1. Please no eating or drinking 3 hours prior to the procedure.   2. No caffeine, alcohol or tobacco 12 hours prior to the procedure.    During the procedure:  1.Please arrive to the Gold Waiting Room in the Main Hospital.   2. You will be required to lay flat and follow breath-hold instructions.   3. You will need to remove all metal and answer a safety questionnaire. Please wear clothes without metal (snaps and zippers). Please remove any body piercings and hair extensions before you arrive. You will also remove watches, jewelry, hairpins, wallets, dentures, partial dental plates and hearing aids. You may wear contact lenses, and you may be able to wear your rings. We have a safe place to keep your personal items, but it is safer to leave them at home.  4. You will be given IV contrast for this exam.  To prepare:  The day before the exam drink extra fluid - at least six 8oz glasses (unless you are on a fluid restriction per your doctor)     SBE prophylaxis:   Yes____  No_X___    Lifelong Bacterial Endocarditis Prophylaxis:  YES____  NO____    If YES is checked, follow the recommendations outlined below:  1. Take antibiotic(s) prior to recommended dental procedures and procedures on the respiratory tract or with infected skin, muscle or bones. SBE prophylaxis is not needed for routine GI and  procedures (ie. Colonoscopy or vaginal delivery)  2. Observe good oral hygiene daily, as advised by your dentist. Get regular professional dental care.  3. Keep cuts  clean.  4. Infections should be treated promptly.  5. Symptoms of Infective Endocarditis could include: fever lasting more than 4-5 days or a recurrent fever that initially resolves but returns within 1-2 days)      Exercise restrictions:   Yes__X__  No____         If yes, list restrictions:  Must be allowed to rest if fatigued or SOB      Work restrictions:  Yes____  No_X___         If yes, list restrictions:    FASTING CHOLESTEROL was checked in the last 5 years YES_X__  NO___ (2019)  Continue to eat a heart healthy, low salt diet.         ____ Fasting lipid panel order today         ____ No changes in medications          ____ I recommend the following changes in your cholesterol medications.:          ____ Please follow up for cholesterol screening at your primary care physician      Follow-up:  Follow up with Dr. Walter in 6 months with a cardiac MRI, CPX and EKG. Follow up with Dr. Medrano and Dr. Walter in 2.5 years with echo, labs, device, and EKG.     If you have questions or concerns please contact us at:    Monisha Estrada MSN, RN, CNL  Christine Torres (Scheduling)  Nurse Care Coordinator     Clinic   Adult Congenital and CV Genetics   Adult Congenital and CV Genetic  [facility]  (P) [phone]     (P) [phone]         (F) [phone]        For after hours urgent needs, call [phone] and ask to speak to the Adult Congenital Physician on call.  Mention Job Code [id number].    For emergencies call 911.    [facility]   Clinics and Surgery Center  Mail Code [id number]  [address] [place] [address]  [id number]

## 2022-01-07 NOTE — NURSING NOTE
Cardiac Testing: Patient given instructions regarding cardiac MRI, CPX, and echocardiogram . Discussed purpose, preparation, procedure and when to expect results reported back to the patient. Patient demonstrated understanding of this information and agreed to call with further questions or concerns.    Diet: Patient instructed regarding a heart healthy diet, including discussion of reduced fat and sodium intake. Patient demonstrated understanding of this information and agreed to call with further questions or concerns.    Labs: Patient was given results of the laboratory testing obtained today. Patient was instructed to return for the next laboratory testing in 2 years. Patient demonstrated understanding of this information and agreed to call with further questions or concerns.     Med Reconcile: Reviewed and verified all current medications with the patient. The updated medication list was printed and given to the patient.    Return Appointment: Patient given instructions regarding scheduling next clinic visit. Patient demonstrated understanding of this information and agreed to call with further questions or concerns.    Patient stated she understood all health information given and agreed to call with further questions or concerns.    Monisha Estrada, MSN, RN, CNL  Cardiology Care Coordinator  [facility]  [phone]

## 2022-01-07 NOTE — PATIENT INSTRUCTIONS
It was a pleasure to see you in clinic today.  Please do not hesitate to call with any questions or concerns.  You are scheduled for a remote transmission on 4/7/22, 7/11/22 and 10/13/22.      Meghna Yost, RN, MS, CCRN  Electrophysiology Nurse Clinician  [facility]    During Business Hours Please Call:  [phone]  After Hours Please Call:  [phone] - select option #4 and ask for job code [id number]

## 2022-02-07 NOTE — PROGRESS NOTES
HPI:  42 yo female with arrhythmogenic cardiomyopathy with (frameshift [id number]) presents for ongoing evaluation and management.   Overall patient reports that she has been feeling well.  She denies any chest pain or pressure, sob/iniguez, orthopnea, pnd, palpitations, syncope/presyncope or ozzy.  She continues to be active walking and playing pickle ball but continues to adhere to her exercise restrictions.  She denies any problems with her medications and reports compliance.      CARDIAC HISTORY: Pt's brother suffered sudden cardiac death in 2012 while playing basketball - he was successfully resuscitated with AED. 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  After patient was found to be mutation positive she underwent cardiac MRI in 2012 which did not reveal any significant findings for right sided involvement of ARVC.  Holter was also unremarkable.  Later patient's mother had MRI and also found to have left-sided (septal) involvement of ARVC.  Given this in addition to carefully examining cardiac MRI from 2015 for left sided involvement of ARVC, past MRI was also reexamined and patient was noted to also have left-sided involvement of ARVC.     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      Hypothyroidism    Hyperlipidemia         PAST SURGICAL HISTORY:  Past Surgical History:   Procedure Laterality Date     DENTAL SURGERY       EP LOOP RECORDER IMPLANT N/A 1/24/2020    Procedure: EP LOOP RECORDER IMPLANT;  Surgeon: Jabsir Gordillo MD;  Location:  [facility]     Strabismus Surgery         ALLERGIES   No Known Allergies    FAMILY HISTORY:  Family History   Problem Relation Age of Onset     Other - See Comments Mother 58        hyperlipidemia, hypothyroidism, ARVD gene +     Other - See Comments Father  "        hyperlipidemia, hypothyroidism     Other - See Comments Brother 32        ([place]) ARVD s/p sudden cardiac arrest     Cardiovascular Brother 30        (Young) MI at age 30; type 1 DM; ARVD gene negative;      Diabetes Type 1 Brother      Lupus Maternal Grandmother      Colon Cancer Maternal Grandmother 80     Cancer Maternal Grandfather         Bladder     Diabetes Daughter 3        type I     Other - See Comments Other         Alcohol Abuse     MEDICATIONS:  atorvastatin (LIPITOR) 20 MG tablet, Take 1 tablet (20 mg) by mouth daily  Cholecalciferol (VITAMIN D3) 1000 units CAPS, Take 1,000 Units by mouth  diltiazem 2% in PLO gel, To anal opening three times daily.  Use a pea-sized amount.  Store at room temperature.  levothyroxine (SYNTHROID/LEVOTHROID) 200 MCG tablet, Take 1 tablet (200 mcg) by mouth daily  metroNIDAZOLE (METROGEL) 1 % external gel, Apply topically daily    No current facility-administered medications on file prior to visit.  metoprolol xl 100mg daily      EXAM:  /75 (BP Location: Right arm, Patient Position: Chair, Cuff Size: Adult Regular)   Pulse (!) 126   Ht 1.65 m (5' 4.96\")   Wt 68.4 kg (150 lb 14.4 oz)   SpO2 99%   BMI 25.14 kg/m    General: appears comfortable, alert and articulate  Head: normocephalic, atraumatic  Eyes: anicteric sclera, EOMI  Neck: no adenopathy, 2+ carotids without bruits  Orophyarynx: moist mucosa, no lesions  Heart: regular, S1/S2, no murmur, gallop, rub, estimated JVP 6-7cm  Lungs: clear, no rales or wheezing  Abdomen: soft, non-tender, bowel sounds present, no hepatomegaly appreciated   Extremities: no clubbing, cyanosis or pedal edema.  Warm, well perfused  Neurological: normal speech and affect, no gross motor deficits    Labs:  LIPID RESULTS:  Lab Results   Component Value Date    CHOL 157 10/05/2021    CHOL 194.0 05/26/2020    HDL 63 10/05/2021    HDL 71.0 05/26/2020    LDL 85 10/05/2021    .0 05/26/2020    LDL 74 07/30/2013    TRIG 46 " 10/05/2021    TRIG 70.0 05/26/2020    CHOLHDLRATIO 2.7 05/26/2020     CBC RESULTS:  Lab Results   Component Value Date    WBC 6.6 01/07/2022    WBC 6.3 06/30/2020    RBC 4.65 01/07/2022    RBC 4.46 06/30/2020    HGB 13.5 01/07/2022    HGB 13.0 04/07/2021    HCT 42.2 01/07/2022    HCT 40.6 04/07/2021    MCV 91 01/07/2022    MCV 90.8 04/07/2021    MCH 29.0 01/07/2022    MCH 29.1 04/07/2021    MCHC 32.0 01/07/2022    MCHC 32.0 04/07/2021    RDW 13.3 01/07/2022    RDW 12.4 04/07/2021     01/07/2022     06/30/2020       BMP RESULTS:  Lab Results   Component Value Date     01/07/2022     04/07/2021    POTASSIUM 4.0 01/07/2022    POTASSIUM 4.1 04/07/2021    CHLORIDE 106 01/07/2022    CHLORIDE 104 04/07/2021    CO2 28 01/07/2022    CO2 27 04/07/2021    ANIONGAP 5 01/07/2022    ANIONGAP 6 04/07/2021    GLC 86 01/07/2022    GLC 81 04/07/2021    BUN 12 01/07/2022    BUN 14 04/07/2021    CR 0.68 01/07/2022    CR 0.74 04/07/2021    GFRESTIMATED >90 01/07/2022    GFRESTIMATED >90 04/07/2021    GFRESTBLACK >90 04/07/2021    ADINA 9.3 01/07/2022    ADINA 9.4 04/07/2021        Procedures:    Holter June 2013:  No significant arrythmias    Cardiac MRI 6/2013: Impressions:  1. Normal left-ventricular size and global function (LVEF 57%).   There were no regional wall- motion abnormalities.   2. Normal right-ventricular size with [normal right-ventricular systolic function (RVEF 55%). There are no major or minor morphological criteria for ARVC (Per the revised criteria (2010).   3. Normal resting perfusion study. There was no late gadolinium enhancement to suggest prior infarct, inflammation, or infiltration.   4. Sub optimal quality of study due to susceptibility and RF artifacts   5. Compared to cardiac MRI dated 04/2012 there appears no change in RV/LV function.    CARDIAC MRI: 7/1/14   IMPRESSION:   1. Normal left ventricular size and systolic function with a calculated ejection fraction of 55 %.   2. Normal  right ventricular size and systolic function with a calculated ejection fraction of 56%. There are no major or minor morphological criteria for ARVC (Per the revised criteria (2010).   3. On delayed enhancement imaging, there is no abnormal hyperenhancement to suggest myocardial scar/inflammation/infiltration In comparison with prior study dated 6/10/2013, no significant changes are noted.    Examination: MR CARDIAC W CONTRAST 12/4/2015   Indication: 35-year-old female with family history of ARVC  Comparison: Cardiac MRIs of 07/01/2014, 06/10/2013 and 04/05/2012  IMPRESSION:  Arrhythmogenic cardiomyopathy with left ventricular involvement based  on tissue characterization by delayed enhancement imaging, and no  right ventricular involvement based on 2010 Task Force criteria.  Review of prior cardiac MRIs shows the same areas of hyperenhancement  dating back to 04/05/2012 with no obvious changes in the interim period.    EKG 12/15/15:  Normal sinus rhyhtm. Sinus arrhythmia.  Unchanged from prior.    Holter 12/2015      Zio-monitor 6/15/18      CMR Report 06-                              Clinical history: 37-year-old female with arrhythmogenic cardiomyopathy and new symptoms of palpitations.  Comparison CMRs: 04/05/2012, 06/10/2013, 07/01/2014 and 12/04/2015  1. The LV is normal in cavity size and wall thickness. The global systolic function is mildly reduced. The  LVEF is 52%. There are no regional wall motion abnormalities.  2. The RV is normal in cavity size. The global systolic function is normal. The RVEF is 61%. There is no regional RV akinesia, dyskinesia or dyssynchronous RV contraction. There are no findings to satisfy CMR criteria for the diagnosis of ARVC as per the 2010 Task Force criteria.  3. Both atria are normal in size.  4. There is no significant valvular disease.   5. Late gadolinium enhancement imaging shows epicardial hyperenhancement of the right ventricular aspect of the mid inferoseptal  segment, mid-myocardial hyperenhancement of the apical septal segment and epicardial hyperenhancement of the mid anterolateral segment. These match the areas of fatty replacement on other imaging.  6. Regadenoson perfusion imaging shows no ischemia.  7. There is no pericardial effusion or thickening.  CONCLUSIONS: Arrhythmogenic cardiomyopathy with left ventricular involvement based on tissue  characterization by delayed enhancement imaging, and no right ventricular involvement based on 2010 Task Force criteria. There have been no changes when compared to the prior studies dating back to 04/05/2012. No ischemia.    Cardiopulmonary Stress Test (7/2018):               Zio-monitor 6/3/2019: (2 days 11 hours)        Loop Monitor Interrogation 1/7/22  Device: Lieferheld [id number] Reveal LINQ  Normal Device Function  Intrinsic Rhythm: VS @ 70 bpm  Patient Activated Episodes: 0  Tachy Episodes: 0  Pause Episodes: 0  Husam Episodes: 0  AF Episodes: 0  Loop recorder battery status = Good  Setting Changes: None      Assessment and Plan:  40 yo female with arrhythmogenic cardiomyopathy with (frameshift [id number]) presents for ongoing evaluation and management.    # Arrhythmogenic cardiomyopathy (frameshift [id number]):   - pt continues to be asymptomatic from a cardiac standpoint with stable exercise capacity.  Her last cardiac MRI in 2018 confirmed normal biventricular function with unchanged left ventricular involvement based on tissue  characterization by delayed enhancement imaging, and no right ventricular involvement.  She had loop monitor placed on 1/24/20 and has had no significant arrhythmias detected.  Have previously had extensive discussion with patient about pathophysiology of arrhythmogenic cardiomyopathy.   To try and reduce arrhythmia risk disease progression will continue beta-blockage therapy with Metoprolol  mg daily.  Reviewed with patient recommendations regarding exercise safety with arrhythmogenic  cardiomyopathy.  Recommended that patient be able to comfortably speak 5-7 words at peak exercise and keep HR < 85% max predicted HR, thus for patient < 150 bpm, during exercise.  Also discussed with patient the need to avoid all stimulants including Sudafed and all ephedrine containing products as well as methamphetamines and cocaine and limit alcohol intake to less than XX drinks per week..  Pt again reminded that all first degree relatives should be screened for arrhythmogenic cardiomyopathy with cardiac MRI, EKG and Holter/Zio monitor.  If family members decide to pursue genetic testing, then clinical screening would be indicated in gene positive, not gene negative, individuals.  Pt educated that any syncopal episode is considered a medical emergency requiring ER visit/evaluaion.    Follow-up:  Follow-up up with Dr. Walter in 6 months with a cardiac MRI, CPX and EKG. Follow up with Dr. shea and Dr. Walter in 2.5 years with echo, labs, device, and EKG.  Will be happy to see sooner if change in clinical status or new questions/concerns arise.    Haylie Medrano MD  Section Head - Advanced Heart Failure, Transplantation and Mechanical Circulatory Support  Director - Adult Congenital and Cardiovascular Genetics Center  Associate Professor of Medicine, [facility]    I spent a total of 30 minutes today with the patient personally reviewing recent cardiac testing and/or laboratory results, today's history and examination, and discussion and counseling with the patient.

## 2022-02-18 ENCOUNTER — MYC MEDICAL ADVICE (OUTPATIENT)
Dept: SURGERY | Facility: CLINIC | Age: 42
End: 2022-02-18
Payer: COMMERCIAL

## 2022-02-18 NOTE — CONFIDENTIAL NOTE
Sent upcoming appointment reminder via Z-good.     Appt date/time: 2/23/2022 at 8:00 AM  Provider: LING Walker  Appt type: Return pt     Bella Gil CMA

## 2022-03-16 ENCOUNTER — ANCILLARY PROCEDURE (OUTPATIENT)
Dept: MAMMOGRAPHY | Facility: CLINIC | Age: 42
End: 2022-03-16
Attending: FAMILY MEDICINE
Payer: COMMERCIAL

## 2022-03-16 DIAGNOSIS — Z12.31 VISIT FOR SCREENING MAMMOGRAM: ICD-10-CM

## 2022-03-16 PROCEDURE — 77067 SCR MAMMO BI INCL CAD: CPT | Mod: GC | Performed by: RADIOLOGY

## 2022-03-16 PROCEDURE — 77063 BREAST TOMOSYNTHESIS BI: CPT | Mod: GC | Performed by: RADIOLOGY

## 2022-04-11 ENCOUNTER — ANCILLARY PROCEDURE (OUTPATIENT)
Dept: CARDIOLOGY | Facility: CLINIC | Age: 42
End: 2022-04-11
Attending: INTERNAL MEDICINE
Payer: COMMERCIAL

## 2022-04-11 DIAGNOSIS — I42.8 ARRHYTHMOGENIC RIGHT VENTRICULAR CARDIOMYOPATHY (H): ICD-10-CM

## 2022-04-11 DIAGNOSIS — Z95.818 STATUS POST PLACEMENT OF IMPLANTABLE LOOP RECORDER: ICD-10-CM

## 2022-04-11 PROCEDURE — 99207 CARDIAC DEVICE CHECK - REMOTE: CPT | Performed by: INTERNAL MEDICINE

## 2022-04-11 PROCEDURE — G2066 INTER DEVC REMOTE 30D: HCPCS

## 2022-07-01 ENCOUNTER — ANCILLARY PROCEDURE (OUTPATIENT)
Dept: CARDIOLOGY | Facility: CLINIC | Age: 42
End: 2022-07-01
Attending: INTERNAL MEDICINE
Payer: COMMERCIAL

## 2022-07-01 ENCOUNTER — HOSPITAL ENCOUNTER (OUTPATIENT)
Dept: CARDIOLOGY | Facility: CLINIC | Age: 42
Discharge: HOME | End: 2022-07-01
Attending: INTERNAL MEDICINE | Admitting: INTERNAL MEDICINE
Payer: COMMERCIAL

## 2022-07-01 ENCOUNTER — HOSPITAL ENCOUNTER (OUTPATIENT)
Dept: MRI IMAGING | Facility: CLINIC | Age: 42
Discharge: HOME | End: 2022-07-01
Attending: INTERNAL MEDICINE
Payer: COMMERCIAL

## 2022-07-01 VITALS
SYSTOLIC BLOOD PRESSURE: 97 MMHG | WEIGHT: 155.4 LBS | BODY MASS INDEX: 25.89 KG/M2 | DIASTOLIC BLOOD PRESSURE: 67 MMHG | HEART RATE: 63 BPM | OXYGEN SATURATION: 100 %

## 2022-07-01 VITALS — HEIGHT: 65 IN | BODY MASS INDEX: 25.9 KG/M2 | WEIGHT: 155.42 LBS

## 2022-07-01 DIAGNOSIS — I42.8 ARRHYTHMOGENIC RIGHT VENTRICULAR CARDIOMYOPATHY (H): ICD-10-CM

## 2022-07-01 DIAGNOSIS — I42.8 ARRHYTHMOGENIC LEFT VENTRICULAR DYSPLASIA (H): ICD-10-CM

## 2022-07-01 DIAGNOSIS — Z95.818 STATUS POST PLACEMENT OF IMPLANTABLE LOOP RECORDER: ICD-10-CM

## 2022-07-01 PROCEDURE — 255N000002 HC RX 255 OP 636: Performed by: INTERNAL MEDICINE

## 2022-07-01 PROCEDURE — 94621 CARDIOPULM EXERCISE TESTING: CPT

## 2022-07-01 PROCEDURE — 93297 REM INTERROG DEV EVAL ICPMS: CPT | Performed by: INTERNAL MEDICINE

## 2022-07-01 PROCEDURE — G0463 HOSPITAL OUTPT CLINIC VISIT: HCPCS | Mod: 25

## 2022-07-01 PROCEDURE — G2066 INTER DEVC REMOTE 30D: HCPCS

## 2022-07-01 PROCEDURE — 75565 CARD MRI VELOC FLOW MAPPING: CPT

## 2022-07-01 PROCEDURE — A9585 GADOBUTROL INJECTION: HCPCS | Performed by: INTERNAL MEDICINE

## 2022-07-01 PROCEDURE — 75565 CARD MRI VELOC FLOW MAPPING: CPT | Mod: 26 | Performed by: INTERNAL MEDICINE

## 2022-07-01 PROCEDURE — 94621 CARDIOPULM EXERCISE TESTING: CPT | Mod: 26 | Performed by: INTERNAL MEDICINE

## 2022-07-01 PROCEDURE — 99215 OFFICE O/P EST HI 40 MIN: CPT | Mod: 25 | Performed by: INTERNAL MEDICINE

## 2022-07-01 PROCEDURE — 75561 CARDIAC MRI FOR MORPH W/DYE: CPT | Mod: 26 | Performed by: INTERNAL MEDICINE

## 2022-07-01 RX ORDER — GADOBUTROL 604.72 MG/ML
9 INJECTION INTRAVENOUS ONCE
Status: COMPLETED | OUTPATIENT
Start: 2022-07-01 | End: 2022-07-01

## 2022-07-01 RX ORDER — METOPROLOL SUCCINATE 100 MG/1
100 TABLET, EXTENDED RELEASE ORAL DAILY
Qty: 90 TABLET | Refills: 3 | Status: SHIPPED | OUTPATIENT
Start: 2022-07-01 | End: 2023-05-03

## 2022-07-01 RX ADMIN — GADOBUTROL 9 ML: 604.72 INJECTION INTRAVENOUS at 13:49

## 2022-07-01 ASSESSMENT — PAIN SCALES - GENERAL: PAINLEVEL: NO PAIN (0)

## 2022-07-01 NOTE — PATIENT INSTRUCTIONS
You were seen today in the Adult Congenital and Cardiovascular Genetics Clinic at the [facility].    Cardiology Providers you saw during your visit:  Yasir Walter MD    Diagnosis:  ARVC    Results:  Yasir Walter MD reviewed the results of your MRI, CPX and device check testing today in clinic.    Recommendations:    Continue to eat a heart healthy, low salt diet.  Continue to get 20-30 minutes of aerobic activity, 4-5 days per week.  Examples of aerobic activity include walking, running, swimming, cycling, etc.  Continue to observe good oral hygiene, with regular dental visits.  Please complete a remote interrogation to your loop recorder remotely      SBE prophylaxis:   Yes____  No___x_    Lifelong Bacterial Endocarditis Prophylaxis:  YES____  NO____    If YES is checked, follow the recommendations outlined below:  Take antibiotic(s) prior to recommended dental procedures and procedures on the respiratory tract or with infected skin, muscle or bones. SBE prophylaxis is not needed for routine GI and  procedures (ie. Colonoscopy or vaginal delivery)  Observe good oral hygiene daily, as advised by your dentist. Get regular professional dental care.  Keep cuts clean.  Infections should be treated promptly.  Symptoms of Infective Endocarditis could include: fever lasting more than 4-5 days or a recurrent fever that initially resolves but returns within 1-2 days)      Exercise restrictions:   Yes__X__  No____         If yes, list restrictions:  Must be allowed to rest if fatigued or SOB      Work restrictions:  Yes____  No_X___         If yes, list restrictions:    FASTING CHOLESTEROL was checked in the last 5 years YES_x__  NO___ (2019)  Continue to eat a heart healthy, low salt diet.         ____ Fasting lipid panel order today         ____ No changes in medications          ____ I recommend the following changes in your cholesterol medications.:          ____ Please follow up for cholesterol screening  at your primary care physician      Follow-up:  Follow up with Dr Walter and Dr Burciaga in 1 year with device check prior only.    If you have questions or concerns please contact us at:    SKIP StaffordN, RN    Christine Torres (Scheduling)  Nurse Care Coordinator     Clinic   Adult Congenital and CV Genetics   Adult Congenital and CV Genetic  [facility]   [facility]  (P) [phone]     (P) [phone]         (F) [phone]        For after hours urgent needs, call [phone] and ask to speak to the Adult Congenital Physician on call.  Mention Job Code 0401.    For emergencies call 911.    [facility]   Clinics and Surgery Center  Mail Code [id number]  [address] [place]  [address]

## 2022-07-01 NOTE — LETTER
7/1/2022      RE: Maren Bee  [address]  [facility] [address]       Dear Colleague,    Thank you for the opportunity to participate in the care of your patient, Maren Bee, at the [facility] [place] at [facility]. Please see a copy of my visit note below.        CV GENETICS ELECTROPHYSIOLOGY CLINIC VISIT    Assessment/Recommendations   Assessment/Plan:    Ms. Maren Bee is a 41 year old female who comes in today for EP consultation regarding DSP mutation. 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 She has a ILR with no arrhythmias recorded.    The patient seems to have a stable cardiomyopathy with stable normal EF and no progression in LGE burden. She does not seem to have high risk features so far. We discuss restrain form competitive or ling endurance sports to decrease her chances of disease progression. SHe currently does not have an indication for ICD. We will have the ILR removed at the end of life of its battery. We will discuss replacing the ILR at that time. I am in favor of not replacing it unless new symptoms happen or progression of the scar process.    We will have the patient follow-up in one year with Dr Burciaga and to discuss removing the ILR.  We will refill metorpolol       History of Present Illness/Subjective    Ms. Maren Bee is a 41 year old female who comes in today for EP consultation regarding DSP mutation..    Ms. Bee is a 41 year old female who has a past medical history significant for ACM (frameshift [id number]) and hypothyroidism.  Her brother suffered sudden cardiac death in 2012 while playing basketball.  He was successfully resuscitated.  CMR showed ARVD and he had secondary prevention ICD placed.  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  This prompted familial evaluation.  Patient also then went on to find that she had the genetic mutation.  CMR in 2012 did not reveal any  significant findings.  Holter was also unremarkable.  Patient's mother also has CMR which showed left-sided (septal) involvement of ARVC.  Patient was reevaluated and per CMR's were felt to also have left-sided involvement of ARVD.  Patient had ILR placed in 1/2020.  No arrhythmias have been recorded on ILR thus far.    She reports feeling well and has no complaints.. She denies chest discomfort, palpitations, abdominal fullness/bloating or peripheral edema, shortness of breath, paroxysmal nocturnal dyspnea, orthopnea, lightheadedness, dizziness, pre-syncope, or syncope. ILR interrogation shows no arrhythmias. Current cardiac medications include: Toprol-XL and Lipitor. She has 2 kids, genetically tested and negative She also has 3 neices tested and have it. She had a repeat CMR today showing stable normal LVEF and no change in LGE pattern compared to 2018.    CARDIAC HISTORY: Pt's brother suffered sudden cardiac death in 2012 while playing basketball - he was successfully resuscitated with AED. 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  After patient was found to be mutation positive she underwent cardiac MRI in 2012 which did not reveal any significant findings for right sided involvement of ARVC.  Holter was also unremarkable.  Later patient's mother had MRI and also found to have left-sided (septal) involvement of ARVC.  Given this in addition to carefully examining cardiac MRI from 2015 for left sided involvement of ARVC, past MRI was also reexamined and patient was noted to also have left-sided involvement of ARVC.     I have reviewed and updated the patient's Past Medical History, Social History, Family History and Medication List.     Cardiographics (Personally Reviewed) :   7/1/22 CMR:  Clinical history: 41-year-old woman with arrhythmogenic cardiomyopathy related to a DSP mutation detected  after a cardiac arrest in her  brother.  Comparison CMR: 04/05/2012, 06/10/2013, 07/01/2014, 12/04/2015, 06/22/2018  1. The LV is normal in cavity size and wall thickness. The global systolic function is normal. The LVEF is 58%. There are no regional wall motion abnormalities.  2. The RV is normal in cavity size. The global systolic function is normal. The RVEF is 60%. There is no regional RV akinesia, dyskinesia, or dyssynchronous RV contraction. There are no findings to satisfy the CMR criteria for the diagnosis of ARVC as per the 2010 Task Force criteria.  3. Both atria are normal in size.  4. There is no significant valvular disease.   5. Late gadolinium enhancement imaging shows near-circumferential subepicardial hyperenhancement in  A ring-like pattern, which matches some of the areas of fatty replacement seen on other imaging. This pattern of hyperenhancement is typical for DSP cardiomyopathy.  6. There is no pericardial effusion or thickening.  CONCLUSIONS: Desmoplakin cardiomyopathy with LV involvement based on tissue characterization by LGE imaging, and no RV involvement. There have been no significant changes when compared to the prior studies dating back to a decade ago.    2018 CMR:  1. The LV is normal in cavity size and wall thickness. The global systolic function is mildly reduced. The LVEF is 52%. There are no regional wall motion abnormalities.  2. The RV is normal in cavity size. The global systolic function is normal. The RVEF is 61%. There is no regional RV akinesia, dyskinesia or dyssynchronous RV contraction. There are no findings to satisfy CMR criteria for the diagnosis of ARVC as per the 2010 Task Force criteria.  3. Both atria are normal in size.  4. There is no significant valvular disease.   5. Late gadolinium enhancement imaging shows epicardial hyperenhancement of the right ventricular aspect of the mid inferoseptal segment, mid-myocardial hyperenhancement of the apical septal segment and epicardial hyperenhancement  of the mid anterolateral segment. These match the areas of fatty replacement on other imaging.  6. Regadenoson perfusion imaging shows no ischemia.  7. There is no pericardial effusion or thickening.  CONCLUSIONS: Arrhythmogenic cardiomyopathy with left ventricular involvement based on tissue characterization by delayed enhancement imaging, and no right ventricular involvement based on 2010 Task Force criteria. There have been no changes when compared to the prior studies dating back to 04/05/2012. No ischemia.          Physical Examination   BP 97/67 (BP Location: Right arm, Patient Position: Chair, Cuff Size: Adult Regular)   Pulse 63   Wt 70.5 kg (155 lb 6.4 oz)   SpO2 100%   BMI 25.89 kg/m      General Appearance:   Alert, well-appearing and in no acute distress.   HEENT: Atraumatic, normocephalic. PERRL.  MMM.   Chest/Lungs:   Respirations unlabored.  Lungs are clear to auscultation.   Cardiovascular:   Regular rate and rhythm.  S1/S2. No murmur.    Abdomen:  Soft, nontender, nondistended.   Extremities: No cyanosis or clubbing. No edema. .    Musculoskeletal: Moves all extremities.  Gait normal.   Skin: Warm, dry, intact.    Neurologic: Mood and affect are appropriate.  Alert and oriented to person, place, time, and situation.          Medications  Allergies   Current Outpatient Medications   Medication Sig Dispense Refill     atorvastatin (LIPITOR) 20 MG tablet Take 1 tablet (20 mg) by mouth daily 90 tablet 3     Cholecalciferol (VITAMIN D3) 1000 units CAPS Take 1,000 Units by mouth       diltiazem 2% in PLO gel To anal opening three times daily.  Use a pea-sized amount.  Store at room temperature. 60 g 3     levothyroxine (SYNTHROID/LEVOTHROID) 200 MCG tablet Take 1 tablet (200 mcg) by mouth daily 90 tablet 3     metoprolol succinate ER (TOPROL-XL) 100 MG 24 hr tablet Take 1 tablet (100 mg) by mouth daily 90 tablet 3     metroNIDAZOLE (METROGEL) 1 % external gel Apply topically daily      No Known  Allergies      Lab Results (Personally Reviewed)    Chemistry/lipid CBC Cardiac Enzymes/BNP/TSH/INR   Lab Results   Component Value Date    BUN 12 01/07/2022     01/07/2022    CO2 28 01/07/2022     Creatinine   Date Value Ref Range Status   01/07/2022 0.68 0.52 - 1.04 mg/dL Final   04/07/2021 0.74 0.52 - 1.04 mg/dL Final       Lab Results   Component Value Date    CHOL 157 10/05/2021    HDL 63 10/05/2021    LDL 85 10/05/2021      Lab Results   Component Value Date    WBC 6.6 01/07/2022    HGB 13.5 01/07/2022    HCT 42.2 01/07/2022    MCV 91 01/07/2022     01/07/2022    Lab Results   Component Value Date    TSH 0.04 (L) 10/05/2021        The patient states understanding and is agreeable with the plan.   Yasir Walter MD [facility]  Cardiology - Electrophysiology    Total time spent on patient visit, reviewing notes, imaging, labs, orders, and completing necessary documentation: 45 minutes.

## 2022-07-01 NOTE — PROGRESS NOTES
[facility] ELECTROPHYSIOLOGY CLINIC VISIT    Assessment/Recommendations   Assessment/Plan:    Ms. Maren Bee is a 41 year old female who comes in today for EP consultation regarding DSP mutation. 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 She has a ILR with no arrhythmias recorded.    The patient seems to have a stable cardiomyopathy with stable normal EF and no progression in LGE burden. She does not seem to have high risk features so far. We discuss restrain form competitive or ling endurance sports to decrease her chances of disease progression. SHe currently does not have an indication for ICD. We will have the ILR removed at the end of life of its battery. We will discuss replacing the ILR at that time. I am in favor of not replacing it unless new symptoms happen or progression of the scar process.    We will have the patient follow-up in one year with Dr Burciaga and to discuss removing the ILR.  We will refill metorpolol       History of Present Illness/Subjective    Ms. Maren Bee is a 41 year old female who comes in today for EP consultation regarding DSP mutation..    Ms. 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  Her brother suffered sudden cardiac death in 2012 while playing basketball.  He was successfully resuscitated.  CMR showed ARVD and he had secondary prevention ICD placed.  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  This prompted familial evaluation.  Patient also then went on to find that she had the genetic mutation.  CMR in 2012 did not reveal any significant findings.  Holter was also unremarkable.  Patient's mother also has CMR which showed left-sided (septal) involvement of ARVC.  Patient was reevaluated and per CMR's were felt to also have left-sided involvement of ARVD.  Patient had ILR placed in 1/2020.  No arrhythmias have been recorded on ILR thus far.    She reports feeling well and has no  complaints.. She denies chest discomfort, palpitations, abdominal fullness/bloating or peripheral edema, shortness of breath, paroxysmal nocturnal dyspnea, orthopnea, lightheadedness, dizziness, pre-syncope, or syncope. ILR interrogation shows no arrhythmias. Current cardiac medications include: Toprol-XL and Lipitor. She has 2 kids, genetically tested and negative She also has 3 neices tested and have it. She had a repeat CMR today showing stable normal LVEF and no change in LGE pattern compared to 2018.    CARDIAC HISTORY: Pt's brother suffered sudden cardiac death in 2012 while playing basketball - he was successfully resuscitated with AED. MRI confirmed ARVD and he had an ICD placed, and [id number] frameshift mutation identified and likely disease-causing.  After patient was found to be mutation positive she underwent cardiac MRI in 2012 which did not reveal any significant findings for right sided involvement of ARVC.  Holter was also unremarkable.  Later patient's mother had MRI and also found to have left-sided (septal) involvement of ARVC.  Given this in addition to carefully examining cardiac MRI from 2015 for left sided involvement of ARVC, past MRI was also reexamined and patient was noted to also have left-sided involvement of ARVC.     I have reviewed and updated the patient's Past Medical History, Social History, Family History and Medication List.     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  Comparison CMR: 04/05/2012, 06/10/2013, 07/01/2014, 12/04/2015, 06/22/2018  1. The LV is normal in cavity size and wall thickness. The global systolic function is normal. The LVEF is 58%. There are no regional wall motion abnormalities.  2. The RV is normal in cavity size. The global systolic function is normal. The RVEF is 60%. There is no regional RV  akinesia, dyskinesia, or dyssynchronous RV contraction. There are no findings to satisfy the CMR criteria for the diagnosis of ARVC as per the 2010 Task Force criteria.  3. Both atria are normal in size.  4. There is no significant valvular disease.   5. Late gadolinium enhancement imaging shows near-circumferential subepicardial hyperenhancement in  A ring-like pattern, which matches some of the areas of fatty replacement seen on other imaging. This pattern of hyperenhancement is typical for DSP cardiomyopathy.  6. There is no pericardial effusion or thickening.  CONCLUSIONS: Desmoplakin cardiomyopathy with LV involvement based on tissue characterization by LGE imaging, and no RV involvement. There have been no significant changes when compared to the prior studies dating back to a decade ago.    2018 CMR:  1. The LV is normal in cavity size and wall thickness. The global systolic function is mildly reduced. The LVEF is 52%. There are no regional wall motion abnormalities.  2. The RV is normal in cavity size. The global systolic function is normal. The RVEF is 61%. There is no regional RV akinesia, dyskinesia or dyssynchronous RV contraction. There are no findings to satisfy CMR criteria for the diagnosis of ARVC as per the 2010 Task Force criteria.  3. Both atria are normal in size.  4. There is no significant valvular disease.   5. Late gadolinium enhancement imaging shows epicardial hyperenhancement of the right ventricular aspect of the mid inferoseptal segment, mid-myocardial hyperenhancement of the apical septal segment and epicardial hyperenhancement of the mid anterolateral segment. These match the areas of fatty replacement on other imaging.  6. Regadenoson perfusion imaging shows no ischemia.  7. There is no pericardial effusion or thickening.  CONCLUSIONS: Arrhythmogenic cardiomyopathy with left ventricular involvement based on tissue characterization by delayed enhancement imaging, and no right  ventricular involvement based on 2010 Task Force criteria. There have been no changes when compared to the prior studies dating back to 04/05/2012. No ischemia.          Physical Examination   BP 97/67 (BP Location: Right arm, Patient Position: Chair, Cuff Size: Adult Regular)   Pulse 63   Wt 70.5 kg (155 lb 6.4 oz)   SpO2 100%   BMI 25.89 kg/m      General Appearance:   Alert, well-appearing and in no acute distress.   HEENT: Atraumatic, normocephalic. PERRL.  MMM.   Chest/Lungs:   Respirations unlabored.  Lungs are clear to auscultation.   Cardiovascular:   Regular rate and rhythm.  S1/S2. No murmur.    Abdomen:  Soft, nontender, nondistended.   Extremities: No cyanosis or clubbing. No edema. .    Musculoskeletal: Moves all extremities.  Gait normal.   Skin: Warm, dry, intact.    Neurologic: Mood and affect are appropriate.  Alert and oriented to person, place, time, and situation.          Medications  Allergies   Current Outpatient Medications   Medication Sig Dispense Refill     atorvastatin (LIPITOR) 20 MG tablet Take 1 tablet (20 mg) by mouth daily 90 tablet 3     Cholecalciferol (VITAMIN D3) 1000 units CAPS Take 1,000 Units by mouth       diltiazem 2% in PLO gel To anal opening three times daily.  Use a pea-sized amount.  Store at room temperature. 60 g 3     levothyroxine (SYNTHROID/LEVOTHROID) 200 MCG tablet Take 1 tablet (200 mcg) by mouth daily 90 tablet 3     metoprolol succinate ER (TOPROL-XL) 100 MG 24 hr tablet Take 1 tablet (100 mg) by mouth daily 90 tablet 3     metroNIDAZOLE (METROGEL) 1 % external gel Apply topically daily      No Known Allergies      Lab Results (Personally Reviewed)    Chemistry/lipid CBC Cardiac Enzymes/BNP/TSH/INR   Lab Results   Component Value Date    BUN 12 01/07/2022     01/07/2022    CO2 28 01/07/2022     Creatinine   Date Value Ref Range Status   01/07/2022 0.68 0.52 - 1.04 mg/dL Final   04/07/2021 0.74 0.52 - 1.04 mg/dL Final       Lab Results   Component  Value Date    CHOL 157 10/05/2021    HDL 63 10/05/2021    LDL 85 10/05/2021      Lab Results   Component Value Date    WBC 6.6 01/07/2022    HGB 13.5 01/07/2022    HCT 42.2 01/07/2022    MCV 91 01/07/2022     01/07/2022    Lab Results   Component Value Date    TSH 0.04 (L) 10/05/2021        The patient states understanding and is agreeable with the plan.   Yasir Walter MD [facility]  Cardiology - Electrophysiology    Total time spent on patient visit, reviewing notes, imaging, labs, orders, and completing necessary documentation: 45 minutes.

## 2022-07-01 NOTE — NURSING NOTE
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      Vitals were taken and medications reconciled.    Enrike Hercules, LISBET  3:26 PM

## 2022-07-05 LAB
CARDIOPULMONARY ANAEROBIC THRESHOLD PREDICTED PEAK: 90 %
CARDIOPULMONARY ANAEROBIC THRESHOLD VO2: 27.4 ML/KG/MIN
CARDIOPULMONARY BLOOD PRESSURE REST: NORMAL MMHG
CARDIOPULMONARY BREATHING RESERVE REST: 85.5
CARDIOPULMONARY BREATHING RESERVE V02MAX: 24
CARDIOPULMONARY CO2 OUTPUT REST: 288 ML/MIN
CARDIOPULMONARY CO2 OUTPUT VO2MAX: 2682 ML/MIN
CARDIOPULMONARY FEV 1.0 (L) ACTUAL: 2.67
CARDIOPULMONARY FEV 1.0 (L) PRECENT: 90 %
CARDIOPULMONARY FEV 1.0 (L) PREDICTED: 2.96
CARDIOPULMONARY FEV 1.0 FVC (%) ACTUAL: 75.1
CARDIOPULMONARY FEV 1.0 FVC (%) PERCENT: 92 %
CARDIOPULMONARY FEV 1.0 FVC (%) PREDICTED: 81.7
CARDIOPULMONARY FUNCTIONAL CAPACITY MAX ML/KG/MIN: 32.7 ML/KG/MIN
CARDIOPULMONARY FUNCTIONAL CAPACITY PERCENT: 108 %
CARDIOPULMONARY FUNCTIONAL CAPACITY PREDICTED: 30.3 ML/KG/MIN
CARDIOPULMONARY FVC (L) ACTUAL: 3.56
CARDIOPULMONARY FVC (L) PERCENT: 97 %
CARDIOPULMONARY FVC (L) PREDICTED: 3.66
CARDIOPULMONARY HEART RATE REST: 81 BPM
CARDIOPULMONARY MET'S REST: 1
CARDIOPULMONARY MINUTE VENTILATION REST: 13.5 L/MIN
CARDIOPULMONARY MINUTE VENTILATION VO2MAX: 70.5 L/MIN
CARDIOPULMONARY MYOCARDIAC O2 DEMAND MAX: NORMAL
CARDIOPULMONARY OXYGEN CONSUMPTION REST: 3.6 ML/KG/MIN
CARDIOPULMONARY OXYGEN CONSUMPTION VO2MAX: 32.7 ML/KG/MIN
CARDIOPULMONARY OXYGEN PULSE REST: 3 ML/BEAT
CARDIOPULMONARY OXYGEN PULSE VO2MAX: 13 ML/BEAT
CARDIOPULMONARY OXYGEN SATURATION- OXIMETRY REST: 99 %
CARDIOPULMONARY OXYGEN SATURATION- OXIMETRY VO2MAX: 98 %
CARDIOPULMONARY PET C02 REST: 24
CARDIOPULMONARY PET C02 VO2MAX: 42
CARDIOPULMONARY PET02 REST: 124
CARDIOPULMONARY PET02 V02 MAX: 108
CARDIOPULMONARY RER: 1.14
CARDIOPULMONARY RESPIRALORY EXCHANGE RATIO VO2MAX: 1.14
CARDIOPULMONARY RESPIRALORY EXCHANGE RATIO: 1.14
CARDIOPULMONARY RESPIRATORY RATE REST: 18 BR/MIN
CARDIOPULMONARY RESPIRATORY RATE VO2MAX: 42 BR/MIN
CARDIOPULMONARY STRESS BASE 1 BP MMHG: NORMAL MMHG
CARDIOPULMONARY STRESS BASE 1 BPA: 164 BPM
CARDIOPULMONARY STRESS BASE 1 SPO2: 98 % SPO2
CARDIOPULMONARY STRESS BASE 1 TIME: 1 MINS
CARDIOPULMONARY STRESS BASE 2 BP MMHG: NORMAL MMHG
CARDIOPULMONARY STRESS BASE 2 BPA: 109 BPM
CARDIOPULMONARY STRESS BASE 2 SPO2: 100 % SPO2
CARDIOPULMONARY STRESS BASE 2 TIME: 3 MINS
CARDIOPULMONARY STRESS BASE 3 BP MMHG: NORMAL MMHG
CARDIOPULMONARY STRESS BASE 3 BPA: 101 BPM
CARDIOPULMONARY STRESS BASE 3 SPO2: 100 % SPO2
CARDIOPULMONARY STRESS BASE 3 TIME: 6 MINS
CARDIOPULMONARY STRESS PHASE 1 BP MMHG: NORMAL MMHG
CARDIOPULMONARY STRESS PHASE 1 BPM: 102 BPM
CARDIOPULMONARY STRESS PHASE 1 SPO2: 99 % SPO2
CARDIOPULMONARY STRESS PHASE 1 TIME: 3 MINS
CARDIOPULMONARY STRESS PHASE 2 BP MMHG: NORMAL MMHG
CARDIOPULMONARY STRESS PHASE 2 BPM: 121 BPM
CARDIOPULMONARY STRESS PHASE 2 SPO2: 99 % SPO2
CARDIOPULMONARY STRESS PHASE 2 TIME: 6 MINS
CARDIOPULMONARY STRESS PHASE 3 BP MMHG: NORMAL MMHG
CARDIOPULMONARY STRESS PHASE 3 BPM: 146 BPM
CARDIOPULMONARY STRESS PHASE 3 SPO2: 98 % SPO2
CARDIOPULMONARY STRESS PHASE 3 TIME: 9 MINS
CARDIOPULMONARY STRESS PHASE 4 BP MMHG: NORMAL MMHG
CARDIOPULMONARY STRESS PHASE 4 BPM: 174 BPM
CARDIOPULMONARY STRESS PHASE 4 SPO2: 98 % SPO2
CARDIOPULMONARY STRESS PHASE 4 TIME: 12 MINS
CARDIOPULMONARY STRESS PHASE 5 BPM: 181 BPM
CARDIOPULMONARY STRESS PHASE 5 SPO2: 98 % SPO2
CARDIOPULMONARY STRESS PHASE 5 TIME SEC: 31 SEC
CARDIOPULMONARY STRESS PHASE 5 TIME: 12 MINS
CARDIOPULMONARY SVC (L) ACTUAL: 3.54
CARDIOPULMONARY SVC (L) PERCENT: 97 %
CARDIOPULMONARY SVC (L) PREDICTED: 3.66
CARDIOPULMONARY TIDAL VOLUME REST: 731 ML
CARDIOPULMONARY TIDAL VOLUME VO2MAX: 1672 ML
CARDIOPULMONARY VE/VCO2 SLOPE: 23.64
CARDIOPULMONARY VENTILATORY EQUIVALENT 02 REST: 53
CARDIOPULMONARY VENTILATORY EQUIVALENT 02 V02: 30
CARDIOPULMONARY VENTILATORY EQUIVALENT C02 REST: 47
CARDIOPULMONARY VENTILATORY EQUIVALENT C02 SLOPE VO2MAX: 23.64
CARDIOPULMONARY VENTILATORY EQUIVALENT C02 VO2MAX: 26
CV STRESS MAX HR HE: 181
PREDICTED VO2MAX: 30.3
RATED PERCEIVED EXERTION: 17
STRESS ANGINA INDEX: 0
STRESS ECHO BASELINE BP: NORMAL MMHG
STRESS ECHO BASELINE HR: 68 BPM
STRESS ECHO CALCULATED PERCENT HR: 102 %
STRESS ECHO LAST STRESS BP: NORMAL MMHG
STRESS ECHO POST ESTIMATED WORKLOAD: 9.3 METS
STRESS ECHO POST EXERCISE DUR MIN: 12 MIN
STRESS ECHO POST EXERCISE DUR SEC: 31 SEC
STRESS ECHO TARGET HR: 177

## 2022-10-04 PROBLEM — U07.1 CLINICAL DIAGNOSIS OF COVID-19: Status: RESOLVED | Noted: 2020-11-10 | Resolved: 2022-10-04

## 2022-10-13 ENCOUNTER — ANCILLARY PROCEDURE (OUTPATIENT)
Dept: CARDIOLOGY | Facility: CLINIC | Age: 42
End: 2022-10-13
Attending: INTERNAL MEDICINE
Payer: COMMERCIAL

## 2022-10-13 ENCOUNTER — TELEPHONE (OUTPATIENT)
Dept: CARE COORDINATION | Facility: CLINIC | Age: 42
End: 2022-10-13

## 2022-10-13 DIAGNOSIS — Z95.818 STATUS POST PLACEMENT OF IMPLANTABLE LOOP RECORDER: ICD-10-CM

## 2022-10-13 DIAGNOSIS — I42.8 ARRHYTHMOGENIC RIGHT VENTRICULAR CARDIOMYOPATHY (H): ICD-10-CM

## 2022-10-13 PROCEDURE — 93297 REM INTERROG DEV EVAL ICPMS: CPT | Performed by: INTERNAL MEDICINE

## 2022-10-13 PROCEDURE — G2066 INTER DEVC REMOTE 30D: HCPCS

## 2022-10-13 NOTE — TELEPHONE ENCOUNTER
Telephone Reminder per Remote Device Check.    M/ reminder    Luis Jarvis MA, Virtual Facilitator, 10/13/2022

## 2022-10-20 ENCOUNTER — OFFICE VISIT (OUTPATIENT)
Dept: FAMILY MEDICINE | Facility: CLINIC | Age: 42
End: 2022-10-20
Payer: COMMERCIAL

## 2022-10-20 VITALS
TEMPERATURE: 97.5 F | OXYGEN SATURATION: 96 % | HEART RATE: 67 BPM | RESPIRATION RATE: 12 BRPM | SYSTOLIC BLOOD PRESSURE: 103 MMHG | WEIGHT: 154 LBS | DIASTOLIC BLOOD PRESSURE: 69 MMHG | BODY MASS INDEX: 25.66 KG/M2

## 2022-10-20 DIAGNOSIS — B96.89 BACTERIAL VAGINOSIS: ICD-10-CM

## 2022-10-20 DIAGNOSIS — L71.9 ROSACEA: ICD-10-CM

## 2022-10-20 DIAGNOSIS — N89.8 VAGINAL DISCHARGE: Primary | ICD-10-CM

## 2022-10-20 DIAGNOSIS — N76.0 BACTERIAL VAGINOSIS: ICD-10-CM

## 2022-10-20 LAB
CLUE CELLS: PRESENT
TRICHOMONAS, WET PREP: ABNORMAL
WBC'S/HIGH POWER FIELD, WET PREP: ABNORMAL
YEAST, WET PREP: ABNORMAL

## 2022-10-20 RX ORDER — METRONIDAZOLE 7.5 MG/G
1 GEL VAGINAL DAILY
Qty: 25 G | Refills: 0 | Status: SHIPPED | OUTPATIENT
Start: 2022-10-20 | End: 2022-10-25

## 2022-10-20 RX ORDER — METRONIDAZOLE 10 MG/G
GEL TOPICAL DAILY
Qty: 60 G | Refills: 3 | Status: SHIPPED | OUTPATIENT
Start: 2022-10-20 | End: 2023-08-08

## 2022-10-20 NOTE — PROGRESS NOTES
CC: Vaginal Discharge (X 1.5 weeks, with milky color and odor )      ASSESSMENT/PLAN:   Maren was seen today for vaginal discharge.    Vaginal discharge  Bacterial vaginosis  Wet prep positive for clue cells. Discussed treatment options, pt elects for vaginal applicator at bedtime for 5 times.   -     Wet Prep ([place])  -     metroNIDAZOLE (METROGEL) 0.75 % vaginal gel; Place 1 applicator (5 g) vaginally daily for 5 days    Rosacea  Also requesting refill of topical metronidazole for rosacea. Refilled.  -     metroNIDAZOLE (METROGEL) 1 % external gel; Apply topically daily      Worrisome signs and symptoms were discussed with Maren and she was instructed to return to the clinic for concerning symptoms or to call with questions.  Return if symptoms worsen or fail to improve.    Marcy Anthony MD  Family Medicine      SUBJECTIVE: Maren is a 42 year old female who comes in with the following concerns:    Vaginal Symptoms  Onset/Duration: 1.5 weeks  Description:  Vaginal Discharge: creamy   Itching (Pruritis): No  Burning sensation:  No  Odor: YES- fishy odor  Accompanying Signs & Symptoms:  Urinary symptoms: No  Abdominal pain: No  Fever: No  History:   Sexually active: YES- monogamous with   New Partner: No  Possibility of Pregnancy:  No -  had vasectomy  Previous vaginitis issues: YES- has had BV in the past, this feels similar. Has also had yeast in the past and this does not feel like yeast.   Precipitating or alleviating factors: None  Therapies tried and outcome: none    Normal menses - Patient's last menstrual period was 10/10/2022 (exact date).    Pap UTD 10/2021      OBJECTIVE:   /69 (BP Location: Right arm, Patient Position: Sitting, Cuff Size: Adult Regular)   Pulse 67   Temp 97.5  F (36.4  C) (Skin)   Resp 12   Wt 69.9 kg (154 lb)   LMP 10/10/2022 (Exact Date)   SpO2 96%   BMI 25.66 kg/m    General: Alert and oriented in no acute distress.  Pelvic: normal vagina and vulva,  vaginal discharge described as creamy and yellow, normal cervix without lesions or tenderness.    Wet prep: + clue cells

## 2022-11-26 ENCOUNTER — HEALTH MAINTENANCE LETTER (OUTPATIENT)
Age: 42
End: 2022-11-26

## 2022-12-07 DIAGNOSIS — E78.5 HYPERLIPIDEMIA LDL GOAL <70: ICD-10-CM

## 2022-12-07 RX ORDER — ATORVASTATIN CALCIUM 20 MG/1
20 TABLET, FILM COATED ORAL DAILY
Qty: 90 TABLET | Refills: 3 | Status: SHIPPED | OUTPATIENT
Start: 2022-12-07 | End: 2024-03-21

## 2023-01-18 ENCOUNTER — ANCILLARY PROCEDURE (OUTPATIENT)
Dept: CARDIOLOGY | Facility: CLINIC | Age: 43
End: 2023-01-18
Attending: INTERNAL MEDICINE
Payer: COMMERCIAL

## 2023-01-18 DIAGNOSIS — I42.9 CARDIOMYOPATHY (H): ICD-10-CM

## 2023-01-18 PROCEDURE — 93297 REM INTERROG DEV EVAL ICPMS: CPT | Performed by: INTERNAL MEDICINE

## 2023-01-18 PROCEDURE — G2066 INTER DEVC REMOTE 30D: HCPCS

## 2023-02-21 DIAGNOSIS — E03.9 ACQUIRED HYPOTHYROIDISM: ICD-10-CM

## 2023-02-21 RX ORDER — LEVOTHYROXINE SODIUM 200 UG/1
200 TABLET ORAL DAILY
Qty: 30 TABLET | Refills: 0 | Status: SHIPPED | OUTPATIENT
Start: 2023-02-21 | End: 2023-03-01

## 2023-02-21 NOTE — TELEPHONE ENCOUNTER
Please contact pt. She is due for TSH. 30 day supply refilled -- no further refills until after TSH.

## 2023-02-21 NOTE — CONFIDENTIAL NOTE
Medication requested: levothyroxine (SYNTHROID/LEVOTHROID) 200 MCG tablet  Last office visit: 10/20/2022  [facility] appointments: 3/1/2023  Medication last refilled: 10/5/2021; 90 + 3 refills  Last qualifying labs:     Component      Latest Ref Rng & Units 10/5/2021   TSH      0.40 - 4.00 mU/L 0.04 (L)     Component      Latest Ref Rng & Units 10/5/2021   T4 Free      0.76 - 1.46 ng/dL 1.66 (H)     Routing refill request to provider for review/approval because:  Labs out of range:  TSH & T4 Free.    STEPHANIE Wilkinson, RN  02/21/23, 2:29 PM

## 2023-02-27 ASSESSMENT — ENCOUNTER SYMPTOMS
FEVER: 0
SORE THROAT: 0
BREAST MASS: 0
HEMATOCHEZIA: 0
JOINT SWELLING: 0
FREQUENCY: 0
EYE PAIN: 0
HEMATURIA: 0
HEARTBURN: 0
PALPITATIONS: 0
WEAKNESS: 0
ARTHRALGIAS: 0
MYALGIAS: 1
DIARRHEA: 0
COUGH: 0
DIZZINESS: 0
ABDOMINAL PAIN: 0
PARESTHESIAS: 0
CHILLS: 0
DYSURIA: 0
CONSTIPATION: 0
NERVOUS/ANXIOUS: 0
NAUSEA: 0
HEADACHES: 0
SHORTNESS OF BREATH: 0

## 2023-02-28 NOTE — PROGRESS NOTES
CC: Physical (Routine physical; pain in left hand. )      Maren is a 42 year old female who presents to clinic for an annual exam.     New concerns:  Mild achy pain on the back of her left hand. Comes and goes. She is left handed. No swelling. Strength is okay. No pain in the forearm or elbow. No numbness and tingling in the hand or fingers.   Pickleball, bowling, sits at computer at types.  Later in the game she will notice pain in the hand. Sometimes at night in bed will also notice.   No treatment tried.      Chronic health conditions:  Patient Active Problem List   Diagnosis     Cardiomyopathy, familial (H)     Keratosis pilaris     Arrhythmogenic left ventricular dysplasia (H)     Hypothyroidism     Hyperlipidemia LDL goal <130     Arrhythmogenic right ventricular cardiomyopathy (H)   Hypothyroidism - Since college. Stable on levothyroxine 200 mcg daily. No s/sx of hypo or hyperthyroidism.     HLD - Stable on atorvastatin 20 mg daily.     Cardiomyopathy - Loop recorder implant. Sees cardiology annually. No symptoms.     We reviewed and updated her medication list as necessary. Her past medical and surgical history as well as her family history were reviewed and updated as necessary.  Sees cardiology regularly. Has loop recorder implant.     Gynecological history:  Menstrual/PMS/menopausal symptoms: regular monthly menses with no perimenopause symptoms  Last Pap:  10/5/2021, result Normal and negative HPV  History of abnormal Paps: 2018 ASCUS with negative HPV  Concern for STIs: no  Contraceptive method: vasectomy   Breast cancer screening: 3/16/2022 - has this years mammogram scheduled already    Other health-related habits:  Nutrition: Varied & balanced  Physical activity: Regularly physically active -- plays pickleball, bowling. Keeps HR within range per cardiology.  Tobacco: None    Alcohol: Occassional  Drug use: Edibles occasionally    Dental: Goes regularly  Vaccines: will get pneumonia vaccine today  "  Hep C & HIV screening: UTD  DM screening: due  Lipids: due      OBJECTIVE:   /76 (BP Location: Right arm, Patient Position: Sitting, Cuff Size: Adult Regular)   Pulse 65   Temp 97.8  F (36.6  C) (Skin)   Resp 16   Ht 1.638 m (5' 4.5\")   Wt 73 kg (161 lb)   LMP 02/11/2023 (Exact Date)   SpO2 100%   BMI 27.21 kg/m     General: Healthy female in NAD.  Cooperative and pleasant.  HEENT: Normocephalic, atraumatic. Oropharynx moist without lesions or exudate.  TMs normal. Supple neck, without lymphadenopathy or thyromegaly.    Breasts: No obvious masses, axillary adenopathy or fibrocystic changes.   Lungs: CTA bilaterally.  CV: RRR, normal S1 and S2, without murmurs, rubs, or gallops appreciated.    Abdomen: Soft, NT, ND.  No masses or hepatosplenomegaly appreciated.    Extremities: WWW, without deformity, edema. No hand tenderness, normal  strength, normal wrist flexion and extension. Normal finger ROM.   Skin: Clear without lesions or rashes.   Neuro: Grossly intact, nonfocal.  Psych: Mood and affect appropriate.      ASSESSMENT AND PLAN:   Maren was seen today for physical.  Routine general medical examination at a health care facility  Mammo scheduled. Pap UTD. Colonoscopy at age 43. Vaccines and hep B titer today. Fasting glucose collected today.   For hand pain, discussed option for x-ray and symptomatic treatment with topical diclofenac. Declines x-ray at this time but will try topical diclofenac prior to bowling and pickleball & will reach out if sx worsening or not improving despite treatment.   -     Pneumococcal 20 Valent Conjugate (Prevnar 20)  -     Hepatitis B Surface Antibody    Pure hypercholesterolemia  Check lipid panel and LFTs today. Continue atorvastatin 20 mg daily.   -     Lipid Profile  -     Comprehensive metabolic panel    Cardiomyopathy, familial (H)  Continue follow-up annually with cardiology.     Acquired hypothyroidism  Check TSH today. Her TSH is above goal -- will " adjust levothyroxine. Was taking 200 mcg daily -- will increase dose to 225 mcg daily and recheck in 6 weeks.      -     TSH - Reflex to FT4      Return in about 1 year (around 3/1/2024).    Marcy Anthony MD  Family Medicine

## 2023-03-01 ENCOUNTER — OFFICE VISIT (OUTPATIENT)
Dept: FAMILY MEDICINE | Facility: CLINIC | Age: 43
End: 2023-03-01

## 2023-03-01 ENCOUNTER — MYC MEDICAL ADVICE (OUTPATIENT)
Dept: FAMILY MEDICINE | Facility: CLINIC | Age: 43
End: 2023-03-01

## 2023-03-01 VITALS
WEIGHT: 161 LBS | SYSTOLIC BLOOD PRESSURE: 113 MMHG | TEMPERATURE: 97.8 F | DIASTOLIC BLOOD PRESSURE: 76 MMHG | HEART RATE: 65 BPM | OXYGEN SATURATION: 100 % | HEIGHT: 65 IN | RESPIRATION RATE: 16 BRPM | BODY MASS INDEX: 26.82 KG/M2

## 2023-03-01 DIAGNOSIS — Z23 NEED FOR VACCINATION: ICD-10-CM

## 2023-03-01 DIAGNOSIS — I42.9 CARDIOMYOPATHY, FAMILIAL (H): ICD-10-CM

## 2023-03-01 DIAGNOSIS — Z00.00 ROUTINE GENERAL MEDICAL EXAMINATION AT A HEALTH CARE FACILITY: Primary | ICD-10-CM

## 2023-03-01 DIAGNOSIS — E78.00 PURE HYPERCHOLESTEROLEMIA: ICD-10-CM

## 2023-03-01 DIAGNOSIS — E03.9 ACQUIRED HYPOTHYROIDISM: ICD-10-CM

## 2023-03-01 DIAGNOSIS — E78.00 PURE HYPERCHOLESTEROLEMIA: Primary | ICD-10-CM

## 2023-03-01 LAB
ALBUMIN SERPL BCG-MCNC: 4.6 G/DL (ref 3.5–5.2)
ALP SERPL-CCNC: 71 U/L (ref 35–104)
ALT SERPL W P-5'-P-CCNC: 22 U/L (ref 10–35)
ANION GAP SERPL CALCULATED.3IONS-SCNC: 12 MMOL/L (ref 7–15)
AST SERPL W P-5'-P-CCNC: 23 U/L (ref 10–35)
BILIRUB SERPL-MCNC: 0.4 MG/DL
BUN SERPL-MCNC: 12.4 MG/DL (ref 6–20)
CALCIUM SERPL-MCNC: 9.7 MG/DL (ref 8.6–10)
CHLORIDE SERPL-SCNC: 103 MMOL/L (ref 98–107)
CHOLEST SERPL-MCNC: 240 MG/DL
CREAT SERPL-MCNC: 0.79 MG/DL (ref 0.51–0.95)
DEPRECATED HCO3 PLAS-SCNC: 24 MMOL/L (ref 22–29)
GFR SERPL CREATININE-BSD FRML MDRD: >90 ML/MIN/1.73M2
GLUCOSE SERPL-MCNC: 94 MG/DL (ref 70–99)
HBV SURFACE AB SERPL IA-ACNC: 0.57 M[IU]/ML
HBV SURFACE AB SERPL IA-ACNC: NONREACTIVE M[IU]/ML
HDLC SERPL-MCNC: 76 MG/DL
LDLC SERPL CALC-MCNC: 153 MG/DL
NONHDLC SERPL-MCNC: 164 MG/DL
POTASSIUM SERPL-SCNC: 4.5 MMOL/L (ref 3.4–5.3)
PROT SERPL-MCNC: 7.6 G/DL (ref 6.4–8.3)
SODIUM SERPL-SCNC: 139 MMOL/L (ref 136–145)
T4 FREE SERPL-MCNC: 1.42 NG/DL (ref 0.9–1.7)
TRIGL SERPL-MCNC: 53 MG/DL
TSH SERPL DL<=0.005 MIU/L-ACNC: 15.2 UIU/ML (ref 0.3–4.2)

## 2023-03-01 PROCEDURE — 80053 COMPREHEN METABOLIC PANEL: CPT | Performed by: FAMILY MEDICINE

## 2023-03-01 PROCEDURE — 86706 HEP B SURFACE ANTIBODY: CPT | Performed by: FAMILY MEDICINE

## 2023-03-01 PROCEDURE — 80061 LIPID PANEL: CPT | Performed by: FAMILY MEDICINE

## 2023-03-01 PROCEDURE — 84443 ASSAY THYROID STIM HORMONE: CPT | Performed by: FAMILY MEDICINE

## 2023-03-01 RX ORDER — LEVOTHYROXINE SODIUM 25 UG/1
25 TABLET ORAL DAILY
Qty: 90 TABLET | Refills: 0 | Status: SHIPPED | OUTPATIENT
Start: 2023-03-01 | End: 2024-03-21

## 2023-03-01 RX ORDER — LEVOTHYROXINE SODIUM 200 UG/1
200 TABLET ORAL DAILY
Qty: 90 TABLET | Refills: 3 | Status: SHIPPED | OUTPATIENT
Start: 2023-03-01 | End: 2024-03-21

## 2023-03-01 NOTE — NURSING NOTE
"42 year old  Chief Complaint   Patient presents with     Physical     Routine physical; pain in left hand.        Blood pressure 113/76, pulse 65, temperature 97.8  F (36.6  C), temperature source Skin, resp. rate 16, height 1.638 m (5' 4.5\"), weight 73 kg (161 lb), last menstrual period 2023, SpO2 100 %, not currently breastfeeding. Body mass index is 27.21 kg/m .  Patient Active Problem List   Diagnosis     Cardiomyopathy, familial (H)     Contact dermatitis     Keratosis pilaris     Arrhythmogenic left ventricular dysplasia (H)     Hypothyroidism     Hyperlipidemia LDL goal <130     Arrhythmogenic right ventricular cardiomyopathy (H)       Wt Readings from Last 2 Encounters:   23 73 kg (161 lb)   10/20/22 69.9 kg (154 lb)     BP Readings from Last 3 Encounters:   23 113/76   10/20/22 103/69   22 97/67         Current Outpatient Medications   Medication     atorvastatin (LIPITOR) 20 MG tablet     Cholecalciferol (VITAMIN D3) 1000 units CAPS     levothyroxine (SYNTHROID/LEVOTHROID) 200 MCG tablet     metoprolol succinate ER (TOPROL XL) 100 MG 24 hr tablet     metroNIDAZOLE (METROGEL) 1 % external gel     diltiazem 2% in PLO gel     No current facility-administered medications for this visit.       Social History     Tobacco Use     Smoking status: Former     Years: 2.00     Types: Cigarettes     Quit date: 4/10/2001     Years since quittin.9     Smokeless tobacco: Never   Vaping Use     Vaping Use: Never used   Substance Use Topics     Alcohol use: Yes     Alcohol/week: 2.0 standard drinks     Types: 2 Standard drinks or equivalent per week     Drug use: No       Health Maintenance Due   Topic Date Due     ADVANCE CARE PLANNING  Never done     HEPATITIS B IMMUNIZATION (1 of 3 - 3-dose series) Never done     Pneumococcal Vaccine: Pediatrics (0 to 5 Years) and At-Risk Patients (6 to 64 Years) (1 - PCV) Never done     YEARLY PREVENTIVE VISIT  10/05/2022     LIPID  10/05/2022     TSH W/FREE " T4 REFLEX  10/05/2022     ALT  01/07/2023     MAMMO SCREENING  03/16/2023       Lab Results   Component Value Date    PAP ASC-US 04/02/2018 March 1, 2023 9:52 AM

## 2023-03-01 NOTE — PATIENT INSTRUCTIONS
Voltaren gel -- can use prior to bowling, pickleball, etc.     Send a message after age 45 and colonoscopy can be ordered   Preventive Health Recommendations  Female Ages 40 to 49    Yearly exam:     See your health care provider every year in order to  1. Review health changes.   2. Discuss preventive care.    3. Review your medicines if your doctor prescribed any.      Get a Pap test every three years (unless you have an abnormal result and your provider advises testing more often).      If you get Pap tests with HPV test, you only need to test every 5 years, unless you have an abnormal result. You do not need a Pap test if your uterus was removed (hysterectomy) and you have not had cancer.      You should be tested each year for STDs (sexually transmitted diseases), if you're at risk.     Ask your doctor if you should have a mammogram.      Have a colonoscopy (test for colon cancer) if someone in your family has had colon cancer or polyps before age 50.       Have a cholesterol test every 5 years.       Have a diabetes test (fasting glucose) after age 45. If you are at risk for diabetes, you should have this test every 3 years.    Shots: Get a flu shot each year. Get a tetanus shot every 10 years.     Nutrition:     Eat at least 5 servings of fruits and vegetables each day.    Eat whole-grain bread, whole-wheat pasta and brown rice instead of white grains and rice.    Get adequate Calcium and Vitamin D.      Lifestyle    Exercise at least 150 minutes a week (an average of 30 minutes a day, 5 days a week). This will help you control your weight and prevent disease.    Limit alcohol to one drink per day.    No smoking.     Wear sunscreen to prevent skin cancer.    See your dentist every six months for an exam and cleaning.

## 2023-03-01 NOTE — NURSING NOTE
Prior to immunization administration, verified patients identity using patient s name and date of birth. Please see Immunization Activity for additional information.     Screening Questionnaire for Adult Immunization    Are you sick today?   No   Do you have allergies to medications, food, a vaccine component or latex?   No   Have you ever had a serious reaction after receiving a vaccination?   No   Do you have a long-term health problem with heart, lung, kidney, or metabolic disease (e.g., diabetes), asthma, a blood disorder, no spleen, complement component deficiency, a cochlear implant, or a spinal fluid leak?  Are you on long-term aspirin therapy?   No   Do you have cancer, leukemia, HIV/AIDS, or any other immune system problem?   No   Do you have a parent, brother, or sister with an immune system problem?   No   In the past 3 months, have you taken medications that affect  your immune system, such as prednisone, other steroids, or anticancer drugs; drugs for the treatment of rheumatoid arthritis, Crohn s disease, or psoriasis; or have you had radiation treatments?   No   Have you had a seizure, or a brain or other nervous system problem?   No   During the past year, have you received a transfusion of blood or blood    products, or been given immune (gamma) globulin or antiviral drug?   No   For women: Are you pregnant or is there a chance you could become       pregnant during the next month?   No   Have you received any vaccinations in the past 4 weeks?   No     Immunization questionnaire answers were all negative.        Per orders of Dr. Anthony, injection of PCV20 given by Bess Richardson LPN. Patient instructed to remain in clinic for 15 minutes afterwards, and to report any adverse reaction to me immediately.       Screening performed by Bess Richardson LPN on 3/1/2023 at 10:38 AM.

## 2023-04-17 ENCOUNTER — OFFICE VISIT (OUTPATIENT)
Dept: FAMILY MEDICINE | Facility: CLINIC | Age: 43
End: 2023-04-17
Payer: COMMERCIAL

## 2023-04-17 ENCOUNTER — TELEPHONE (OUTPATIENT)
Dept: FAMILY MEDICINE | Facility: CLINIC | Age: 43
End: 2023-04-17

## 2023-04-17 VITALS
TEMPERATURE: 98.1 F | WEIGHT: 156.04 LBS | HEIGHT: 64 IN | SYSTOLIC BLOOD PRESSURE: 119 MMHG | BODY MASS INDEX: 26.64 KG/M2 | HEART RATE: 111 BPM | DIASTOLIC BLOOD PRESSURE: 73 MMHG | OXYGEN SATURATION: 96 %

## 2023-04-17 DIAGNOSIS — R07.0 THROAT PAIN: Primary | ICD-10-CM

## 2023-04-17 DIAGNOSIS — J18.9 PNEUMONIA OF LEFT LOWER LOBE DUE TO INFECTIOUS ORGANISM: ICD-10-CM

## 2023-04-17 LAB
DEPRECATED S PYO AG THROAT QL EIA: NEGATIVE
GROUP A STREP BY PCR: NOT DETECTED

## 2023-04-17 PROCEDURE — 87651 STREP A DNA AMP PROBE: CPT | Performed by: FAMILY MEDICINE

## 2023-04-17 RX ORDER — AMOXICILLIN 500 MG/1
1000 TABLET, FILM COATED ORAL 3 TIMES DAILY
Qty: 42 TABLET | Refills: 0 | Status: SHIPPED | OUTPATIENT
Start: 2023-04-17 | End: 2023-08-01

## 2023-04-17 NOTE — NURSING NOTE
"42 year old  Chief Complaint   Patient presents with     Throat Problem     Sore throat started Thursday. Fever today and cough. Covid test was negative       Blood pressure 119/73, pulse 111, temperature 98.1  F (36.7  C), height 1.638 m (5' 4.49\"), weight 70.8 kg (156 lb 0.6 oz), last menstrual period 2023, SpO2 96 %, not currently breastfeeding. Body mass index is 26.38 kg/m .  Patient Active Problem List   Diagnosis     Cardiomyopathy, familial (H)     Keratosis pilaris     Arrhythmogenic left ventricular dysplasia (H)     Hypothyroidism     Hyperlipidemia LDL goal <130     Arrhythmogenic right ventricular cardiomyopathy (H)       Wt Readings from Last 2 Encounters:   23 70.8 kg (156 lb 0.6 oz)   23 73 kg (161 lb)     BP Readings from Last 3 Encounters:   23 119/73   23 113/76   10/20/22 103/69         Current Outpatient Medications   Medication     atorvastatin (LIPITOR) 20 MG tablet     Cholecalciferol (VITAMIN D3) 1000 units CAPS     levothyroxine (SYNTHROID/LEVOTHROID) 200 MCG tablet     levothyroxine (SYNTHROID/LEVOTHROID) 25 MCG tablet     metoprolol succinate ER (TOPROL XL) 100 MG 24 hr tablet     metroNIDAZOLE (METROGEL) 1 % external gel     No current facility-administered medications for this visit.       Social History     Tobacco Use     Smoking status: Former     Years: 2.00     Types: Cigarettes     Quit date: 4/10/2001     Years since quittin.0     Smokeless tobacco: Never   Vaping Use     Vaping status: Never Used   Substance Use Topics     Alcohol use: Yes     Alcohol/week: 2.0 standard drinks of alcohol     Types: 2 Standard drinks or equivalent per week     Drug use: No       Health Maintenance Due   Topic Date Due     ADVANCE CARE PLANNING  Never done     HEPATITIS B IMMUNIZATION (1 of 3 - 3-dose series) Never done     MAMMO SCREENING  2023       Lab Results   Component Value Date    PAP ASC-US 2018 1:21 PM  "

## 2023-04-17 NOTE — PATIENT INSTRUCTIONS
ASSESSMENT/PLAN:    Pneumonia given fever, tachycardia, other symptoms and LEFT lower lung abnormal exam     -Treat with Amoxicillin 1 gram 3 times/day for 7 days.   - Send throat swab out for culture. If culture returns positive, may alter the specific treatment to be twice daily and longer duration.   - increase fluids.   - Let us know if no improvement over the next few days.       --Zoran Hennessy MD

## 2023-04-17 NOTE — PROGRESS NOTES
Medical assistant intake:  Maren Bee is a 42 year old female who presents to [facility] today for Throat Problem (Sore throat started Thursday. Fever today and cough. Covid test was negative)        ASSESSMENT/PLAN:    1. Pneumonia given fever, tachycardia, other symptoms and LEFT lower lung abnormal exam     -Treat with Amoxicillin 1 gram 3 times/day for 7 days.   - Send throat swab out for culture. If culture returns positive, may alter the specific treatment to be twice daily and longer duration.   - increase fluids.   - Let us know if no improvement over the next few days.       --Zoran Hennessy MD      SUBJECTIVE:   Maren has been ill for about 4 days. Started with a headache and scratchy throat.   Then, developed a cough 2 days ago and the sore throat worsened.   Has nasal congestion and a cough. Cough is more productive of sputum.   Today had some chills and Temp of 101.9.     LMP: about 2 weeks ago.       Review Of Systems:    See subjective.   No G.I. or urinary concerns.     Problem list per EMR:  Patient Active Problem List   Diagnosis     Cardiomyopathy, familial (H)     Keratosis pilaris     Arrhythmogenic left ventricular dysplasia (H)     Hypothyroidism     Hyperlipidemia LDL goal <130     Arrhythmogenic right ventricular cardiomyopathy (H)       Current Outpatient Medications   Medication Sig Dispense Refill     atorvastatin (LIPITOR) 20 MG tablet Take 1 tablet (20 mg) by mouth daily 90 tablet 3     Cholecalciferol (VITAMIN D3) 1000 units CAPS Take 1,000 Units by mouth daily       levothyroxine (SYNTHROID/LEVOTHROID) 200 MCG tablet Take 1 tablet (200 mcg) by mouth daily Take in addition to 25 mcg tablet for a total of 225 mcg daily. 90 tablet 3     levothyroxine (SYNTHROID/LEVOTHROID) 25 MCG tablet Take 1 tablet (25 mcg) by mouth daily Take in addition to 200 mcg tablet for a total of 225 mcg daily. 90 tablet 0     metoprolol succinate ER (TOPROL XL) 100 MG 24 hr tablet Take 1  "tablet (100 mg) by mouth daily 90 tablet 3     metroNIDAZOLE (METROGEL) 1 % external gel Apply topically daily 60 g 3       No Known Allergies         OBJECTIVE    Vitals: /73   Pulse 111   Temp 98.1  F (36.7  C)   Ht 1.638 m (5' 4.49\")   Wt 70.8 kg (156 lb 0.6 oz)   LMP 02/11/2023 (Exact Date)   SpO2 96%   BMI 26.38 kg/m    BMI= Body mass index is 26.38 kg/m .  Appears in no distress.   Tms - Normal   Oropharynx is with tonsils that are slightly enlarged.  Mildly erythematous.  No exudate.  Neck is supple and without lymphadenopathy.    Cardiovascular is tachycardic at about 110 bpm.  It appears regular.    Lung exam is noteworthy for left sided inspiratory and expiratory wheezes.  This is in the lower left lung fields.  The right lower lung fields appear normal.  The upper lung fields appear normal.    Results for orders placed or performed in visit on 04/17/23   Streptococcus A Rapid Screen w/Reflex to PCR     Status: Normal    Specimen: Throat; Swab   Result Value Ref Range    Group A Strep antigen Negative Negative       SEE TOP OF NOTE FOR ASSESSMENT AND PLAN    --Zoran Hennessy MD  [facility], Department of Family Medicine and Community Health  "

## 2023-04-17 NOTE — TELEPHONE ENCOUNTER
Spoke with pt regarding her sore throat symptoms. She feels like she has a strep infection and is requesting an e-visit for antibiotic prescription. Explained that she will need to be seen in person so rapid strep testing can be performed and evaluation of symptoms to determine plan of care. She is scheduled for an appt with Dr. Hennessy in clinic this afternoon.    Torrey BROWN, RN  04/17/23 10:50 AM

## 2023-04-20 ENCOUNTER — TELEPHONE (OUTPATIENT)
Dept: CARDIOLOGY | Facility: CLINIC | Age: 43
End: 2023-04-20

## 2023-04-20 NOTE — TELEPHONE ENCOUNTER
Telephone Reminder per Remote Device Check.    MyChart Reminder Sent per Remote Device Check.  Irvin Samayoa, Virtual Facilitator, 4/20/2023

## 2023-04-30 DIAGNOSIS — I42.8 ARRHYTHMOGENIC LEFT VENTRICULAR DYSPLASIA (H): ICD-10-CM

## 2023-05-03 RX ORDER — METOPROLOL SUCCINATE 100 MG/1
TABLET, EXTENDED RELEASE ORAL
Qty: 90 TABLET | Refills: 0 | Status: SHIPPED | OUTPATIENT
Start: 2023-05-03 | End: 2023-05-24

## 2023-05-03 NOTE — TELEPHONE ENCOUNTER
7/1/2022 LVD- RTC one year [facility] Yasir Bolton MD

## 2023-05-15 ENCOUNTER — ANCILLARY PROCEDURE (OUTPATIENT)
Dept: CARDIOLOGY | Facility: CLINIC | Age: 43
End: 2023-05-15
Attending: INTERNAL MEDICINE
Payer: COMMERCIAL

## 2023-05-15 DIAGNOSIS — I42.9 CARDIOMYOPATHY (H): ICD-10-CM

## 2023-05-15 PROCEDURE — 93297 REM INTERROG DEV EVAL ICPMS: CPT | Performed by: INTERNAL MEDICINE

## 2023-05-15 PROCEDURE — G2066 INTER DEVC REMOTE 30D: HCPCS

## 2023-05-17 LAB
MDC_IDC_MSMT_BATTERY_STATUS: NORMAL
MDC_IDC_PG_IMPLANT_DTM: NORMAL
MDC_IDC_PG_MFG: NORMAL
MDC_IDC_PG_MODEL: NORMAL
MDC_IDC_PG_SERIAL: NORMAL
MDC_IDC_PG_TYPE: NORMAL
MDC_IDC_SESS_CLINIC_NAME: NORMAL
MDC_IDC_SESS_DTM: NORMAL
MDC_IDC_SESS_TYPE: NORMAL
MDC_IDC_SET_ZONE_TYPE: NORMAL
MDC_IDC_STAT_AT_BURDEN_PERCENT: 0 %
MDC_IDC_STAT_AT_DTM_END: NORMAL
MDC_IDC_STAT_AT_DTM_START: NORMAL
MDC_IDC_STAT_EPISODE_TOTAL_COUNT: 0
MDC_IDC_STAT_EPISODE_TOTAL_COUNT: 1
MDC_IDC_STAT_EPISODE_TOTAL_COUNT_DTM_END: NORMAL
MDC_IDC_STAT_EPISODE_TOTAL_COUNT_DTM_START: NORMAL
MDC_IDC_STAT_EPISODE_TYPE: NORMAL

## 2023-05-21 DIAGNOSIS — I42.8 ARRHYTHMOGENIC LEFT VENTRICULAR DYSPLASIA (H): ICD-10-CM

## 2023-05-24 RX ORDER — METOPROLOL SUCCINATE 100 MG/1
100 TABLET, EXTENDED RELEASE ORAL DAILY
Qty: 90 TABLET | Refills: 0 | Status: SHIPPED | OUTPATIENT
Start: 2023-05-24 | End: 2023-10-27

## 2023-05-27 ENCOUNTER — HEALTH MAINTENANCE LETTER (OUTPATIENT)
Age: 43
End: 2023-05-27

## 2023-07-17 ENCOUNTER — ANCILLARY PROCEDURE (OUTPATIENT)
Dept: MAMMOGRAPHY | Facility: CLINIC | Age: 43
End: 2023-07-17
Attending: FAMILY MEDICINE
Payer: COMMERCIAL

## 2023-07-17 DIAGNOSIS — Z12.31 VISIT FOR SCREENING MAMMOGRAM: ICD-10-CM

## 2023-07-17 PROCEDURE — 77067 SCR MAMMO BI INCL CAD: CPT | Mod: GC | Performed by: RADIOLOGY

## 2023-07-17 PROCEDURE — 77063 BREAST TOMOSYNTHESIS BI: CPT | Mod: GC | Performed by: RADIOLOGY

## 2023-07-18 DIAGNOSIS — I42.8 ARRHYTHMOGENIC RIGHT VENTRICULAR CARDIOMYOPATHY (H): ICD-10-CM

## 2023-07-18 DIAGNOSIS — I42.8 ARRHYTHMOGENIC LEFT VENTRICULAR DYSPLASIA (H): Primary | ICD-10-CM

## 2023-07-31 ENCOUNTER — MYC MEDICAL ADVICE (OUTPATIENT)
Dept: FAMILY MEDICINE | Facility: CLINIC | Age: 43
End: 2023-07-31

## 2023-07-31 DIAGNOSIS — N76.0 BACTERIAL VAGINOSIS: ICD-10-CM

## 2023-07-31 DIAGNOSIS — B96.89 BACTERIAL VAGINOSIS: ICD-10-CM

## 2023-07-31 DIAGNOSIS — N89.8 VAGINAL DISCHARGE: Primary | ICD-10-CM

## 2023-08-07 ENCOUNTER — LAB (OUTPATIENT)
Dept: LAB | Facility: CLINIC | Age: 43
End: 2023-08-07
Payer: COMMERCIAL

## 2023-08-07 DIAGNOSIS — N89.8 VAGINAL DISCHARGE: ICD-10-CM

## 2023-08-07 LAB
BACTERIAL VAGINOSIS VAG-IMP: POSITIVE
CANDIDA DNA VAG QL NAA+PROBE: NOT DETECTED
CANDIDA GLABRATA / CANDIDA KRUSEI DNA: NOT DETECTED
T VAGINALIS DNA VAG QL NAA+PROBE: NOT DETECTED

## 2023-08-07 PROCEDURE — 0352U MULTIPLEX VAGINAL PANEL BY PCR: CPT

## 2023-08-16 ENCOUNTER — ANCILLARY PROCEDURE (OUTPATIENT)
Dept: CARDIOLOGY | Facility: CLINIC | Age: 43
End: 2023-08-16
Attending: INTERNAL MEDICINE
Payer: COMMERCIAL

## 2023-08-16 DIAGNOSIS — I42.9 CARDIOMYOPATHY (H): ICD-10-CM

## 2023-08-16 PROCEDURE — 93297 REM INTERROG DEV EVAL ICPMS: CPT | Performed by: INTERNAL MEDICINE

## 2023-08-16 PROCEDURE — G2066 INTER DEVC REMOTE 30D: HCPCS

## 2023-09-09 ENCOUNTER — ANCILLARY PROCEDURE (OUTPATIENT)
Dept: CARDIOLOGY | Facility: CLINIC | Age: 43
End: 2023-09-09
Attending: INTERNAL MEDICINE
Payer: COMMERCIAL

## 2023-09-09 DIAGNOSIS — I42.9 CARDIOMYOPATHY (H): ICD-10-CM

## 2023-09-09 PROCEDURE — 99207 CARDIAC DEVICE CHECK - REMOTE: CPT | Performed by: INTERNAL MEDICINE

## 2023-09-09 PROCEDURE — G2066 INTER DEVC REMOTE 30D: HCPCS

## 2023-09-18 ENCOUNTER — MYC MEDICAL ADVICE (OUTPATIENT)
Dept: FAMILY MEDICINE | Facility: CLINIC | Age: 43
End: 2023-09-18

## 2023-09-18 DIAGNOSIS — B96.89 BACTERIAL VAGINOSIS: Primary | ICD-10-CM

## 2023-09-18 DIAGNOSIS — N76.0 BACTERIAL VAGINOSIS: Primary | ICD-10-CM

## 2023-09-18 RX ORDER — METRONIDAZOLE 7.5 MG/G
1 GEL VAGINAL
Qty: 120 G | Refills: 1 | Status: SHIPPED | OUTPATIENT
Start: 2023-09-18 | End: 2024-03-01

## 2023-09-18 RX ORDER — METRONIDAZOLE 500 MG/1
500 TABLET ORAL 2 TIMES DAILY
Qty: 14 TABLET | Refills: 0 | Status: SHIPPED | OUTPATIENT
Start: 2023-09-18 | End: 2023-09-25

## 2023-09-22 LAB
MDC_IDC_MSMT_BATTERY_DTM: NORMAL
MDC_IDC_MSMT_BATTERY_STATUS: NORMAL
MDC_IDC_MSMT_BATTERY_STATUS: NORMAL
MDC_IDC_PG_IMPLANT_DTM: NORMAL
MDC_IDC_PG_IMPLANT_DTM: NORMAL
MDC_IDC_PG_MFG: NORMAL
MDC_IDC_PG_MFG: NORMAL
MDC_IDC_PG_MODEL: NORMAL
MDC_IDC_PG_MODEL: NORMAL
MDC_IDC_PG_SERIAL: NORMAL
MDC_IDC_PG_SERIAL: NORMAL
MDC_IDC_PG_TYPE: NORMAL
MDC_IDC_PG_TYPE: NORMAL
MDC_IDC_SESS_CLINIC_NAME: NORMAL
MDC_IDC_SESS_CLINIC_NAME: NORMAL
MDC_IDC_SESS_DTM: NORMAL
MDC_IDC_SESS_DTM: NORMAL
MDC_IDC_SESS_TYPE: NORMAL
MDC_IDC_SESS_TYPE: NORMAL
MDC_IDC_SET_ZONE_DETECTION_INTERVAL: 2000 MS
MDC_IDC_SET_ZONE_DETECTION_INTERVAL: 3000 MS
MDC_IDC_SET_ZONE_DETECTION_INTERVAL: 320 MS
MDC_IDC_SET_ZONE_TYPE: NORMAL
MDC_IDC_STAT_AT_BURDEN_PERCENT: 0 %
MDC_IDC_STAT_AT_BURDEN_PERCENT: 0 %
MDC_IDC_STAT_AT_DTM_END: NORMAL
MDC_IDC_STAT_AT_DTM_END: NORMAL
MDC_IDC_STAT_AT_DTM_START: NORMAL
MDC_IDC_STAT_AT_DTM_START: NORMAL
MDC_IDC_STAT_EPISODE_RECENT_COUNT: 0
MDC_IDC_STAT_EPISODE_RECENT_COUNT_DTM_END: NORMAL
MDC_IDC_STAT_EPISODE_RECENT_COUNT_DTM_START: NORMAL
MDC_IDC_STAT_EPISODE_TOTAL_COUNT: 0
MDC_IDC_STAT_EPISODE_TOTAL_COUNT: 1
MDC_IDC_STAT_EPISODE_TOTAL_COUNT: 1
MDC_IDC_STAT_EPISODE_TOTAL_COUNT_DTM_END: NORMAL
MDC_IDC_STAT_EPISODE_TOTAL_COUNT_DTM_START: NORMAL
MDC_IDC_STAT_EPISODE_TYPE: NORMAL

## 2023-10-26 DIAGNOSIS — I42.8 ARRHYTHMOGENIC LEFT VENTRICULAR DYSPLASIA (H): ICD-10-CM

## 2023-10-27 RX ORDER — METOPROLOL SUCCINATE 100 MG/1
100 TABLET, EXTENDED RELEASE ORAL DAILY
Qty: 90 TABLET | Refills: 0 | Status: SHIPPED | OUTPATIENT
Start: 2023-10-27 | End: 2024-04-04

## 2023-10-27 NOTE — TELEPHONE ENCOUNTER
metoprolol succinate ER (TOPROL XL) 100 MG 24 hr tablet 90 tablet 0 5/24/2023       Last Office Visit: 7/1/22  Future Office visit:   12/1/23      90 day jennifer refill sent to the pharmacy. Overdue appointment per the refill protocol   Monica Woods RN

## 2023-11-30 NOTE — PROGRESS NOTES
CV GENETICS ELECTROPHYSIOLOGY VIRTUAL CLINIC VISIT    Assessment/Recommendations   Assessment/Plan:    Ms. Maren Bee is a 43 year old female who comes in today for EP consultation regarding DSP mutation. 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 She has a ILR with no arrhythmias recorded.    The patient seems to have a stable cardiomyopathy with stable normal EF and no progression in LGE burden. She does not seem to have high risk features so far. We discuss restrain form competitive or endurance sports to decrease her chances of disease progression. She currently does not have an indication for ICD. We will have the ILR removed at the end of life of its battery. We will discuss replacing the ILR at that time. We are in favor of not replacing it unless new symptoms happen or progression of the scar process. Given CMRs have been stable and no changes in symptoms, we will not reimplant.     Follow-up with EP as needed  Follow up with Dr Concepcion in one year  MRI every 4 years unless new symptoms       History of Present Illness/Subjective    Ms. Maren Bee is a 43 year old female who comes in today for EP consultation regarding DSP mutation..    Ms. 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  Her brother suffered sudden cardiac death in 2012 while playing basketball.  He was successfully resuscitated.  CMR showed ARVD and he had secondary prevention ICD placed.  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  This prompted familial evaluation.  Patient also then went on to find that she had the genetic mutation.  CMR in 2012 did not reveal any significant findings.  Holter was also unremarkable.  Patient's mother also has CMR which showed left-sided (septal) involvement of ARVC.  Patient was reevaluated and per CMR's were felt to also have left-sided involvement of ARVD.  Patient had ILR placed in 1/2020.  No arrhythmias have  been recorded on ILR thus far.    EP Visit 7/1/2022: She reports feeling well and has no complaints.. She denies chest discomfort, palpitations, abdominal fullness/bloating or peripheral edema, shortness of breath, paroxysmal nocturnal dyspnea, orthopnea, lightheadedness, dizziness, pre-syncope, or syncope. ILR interrogation shows no arrhythmias. Current cardiac medications include: Toprol-XL and Lipitor. She has 2 kids, genetically tested and negative She also has 3 neices tested and have it. She had a repeat CMR today showing stable normal LVEF and no change in LGE pattern compared to 2018.    She presents today for follow up. ILR is at RRT and she is here to discuss removal and if replacement is needed. She reports feeling well. No new symptoms. She is establishing with Dr. Concepcion today. She denies chest discomfort, palpitations, abdominal fullness/bloating or peripheral edema, shortness of breath, paroxysmal nocturnal dyspnea, orthopnea, lightheadedness, dizziness, pre-syncope, or syncope. Current cardiac medications include: Toprol XL and Lipitor.       CARDIAC HISTORY: Pt's brother suffered sudden cardiac death in 2012 while playing basketball - he was successfully resuscitated with AED. 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  After patient was found to be mutation positive she underwent cardiac MRI in 2012 which did not reveal any significant findings for right sided involvement of ARVC.  Holter was also unremarkable.  Later patient's mother had MRI and also found to have left-sided (septal) involvement of ARVC.  Given this in addition to carefully examining cardiac MRI from 2015 for left sided involvement of ARVC, past MRI was also reexamined and patient was noted to also have left-sided involvement of ARVC.   She has 2 kids both tested negative for mutation. She has her brother who is the index case, has an ICD, and her sister is  negative.    I have reviewed and updated the patient's Past Medical History, Social History, Family History and Medication List.     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  Comparison CMR: 04/05/2012, 06/10/2013, 07/01/2014, 12/04/2015, 06/22/2018  1. The LV is normal in cavity size and wall thickness. The global systolic function is normal. The LVEF is 58%. There are no regional wall motion abnormalities.  2. The RV is normal in cavity size. The global systolic function is normal. The RVEF is 60%. There is no regional RV akinesia, dyskinesia, or dyssynchronous RV contraction. There are no findings to satisfy the CMR criteria for the diagnosis of ARVC as per the 2010 Task Force criteria.  3. Both atria are normal in size.  4. There is no significant valvular disease.   5. Late gadolinium enhancement imaging shows near-circumferential subepicardial hyperenhancement in  A ring-like pattern, which matches some of the areas of fatty replacement seen on other imaging. This pattern of hyperenhancement is typical for DSP cardiomyopathy.  6. There is no pericardial effusion or thickening.  CONCLUSIONS: Desmoplakin cardiomyopathy with LV involvement based on tissue characterization by LGE imaging, and no RV involvement. There have been no significant changes when compared to the prior studies dating back to a decade ago.    2018 CMR:  1. The LV is normal in cavity size and wall thickness. The global systolic function is mildly reduced. The LVEF is 52%. There are no regional wall motion abnormalities.  2. The RV is normal in cavity size. The global systolic function is normal. The RVEF is 61%. There is no regional RV akinesia, dyskinesia or dyssynchronous RV contraction. There are no findings to satisfy CMR criteria for the diagnosis of ARVC as per the 2010 Task Force  "criteria.  3. Both atria are normal in size.  4. There is no significant valvular disease.   5. Late gadolinium enhancement imaging shows epicardial hyperenhancement of the right ventricular aspect of the mid inferoseptal segment, mid-myocardial hyperenhancement of the apical septal segment and epicardial hyperenhancement of the mid anterolateral segment. These match the areas of fatty replacement on other imaging.  6. Regadenoson perfusion imaging shows no ischemia.  7. There is no pericardial effusion or thickening.  CONCLUSIONS: Arrhythmogenic cardiomyopathy with left ventricular involvement based on tissue characterization by delayed enhancement imaging, and no right ventricular involvement based on 2010 Task Force criteria. There have been no changes when compared to the prior studies dating back to 04/05/2012. No ischemia.          Physical Examination   Ht 1.638 m (5' 4.5\")   Wt 70.3 kg (155 lb)   BMI 26.19 kg/m      The rest of a comprehensive physical examination is deferred due to nature of video visit restrictions.  CONSITUTIONAL: no acute distress  HEENT: no icterus, no redness or discharge, neck supple  CV: no visible edema of visualized extremities. No JVD.   RESPIRATORY: respirations nonlabored, no cough  NEURO: AA&Ox3, speech fluent/appropriate, motor grossly nonfocal  PSYCH: cooperative, affect appropriate  DERM: no rashes on visualized face/neck/upper extremities         Medications  Allergies   Current Outpatient Medications   Medication Sig Dispense Refill    atorvastatin (LIPITOR) 20 MG tablet Take 1 tablet (20 mg) by mouth daily 90 tablet 3    Cholecalciferol (VITAMIN D3) 1000 units CAPS Take 1,000 Units by mouth daily      levothyroxine (SYNTHROID/LEVOTHROID) 200 MCG tablet Take 1 tablet (200 mcg) by mouth daily Take in addition to 25 mcg tablet for a total of 225 mcg daily. 90 tablet 3    levothyroxine (SYNTHROID/LEVOTHROID) 25 MCG tablet Take 1 tablet (25 mcg) by mouth daily Take in " addition to 200 mcg tablet for a total of 225 mcg daily. 90 tablet 0    metoprolol succinate ER (TOPROL XL) 100 MG 24 hr tablet Take 1 tablet (100 mg) by mouth daily 90 tablet 0    metroNIDAZOLE (METROGEL) 0.75 % vaginal gel Place 1 applicator (5 g) vaginally twice a week for 48 doses Start Metrogel after completing the course of oral metronidazole to prevent recurrence. 120 g 1    No Known Allergies      Lab Results (Personally Reviewed)    Chemistry/lipid CBC Cardiac Enzymes/BNP/TSH/INR   Lab Results   Component Value Date    BUN 12.4 03/01/2023     03/01/2023    CO2 24 03/01/2023     Creatinine   Date Value Ref Range Status   03/01/2023 0.79 0.51 - 0.95 mg/dL Final   04/07/2021 0.74 0.52 - 1.04 mg/dL Final       Lab Results   Component Value Date    CHOL 240 (H) 03/01/2023    HDL 76 03/01/2023     (H) 03/01/2023      Lab Results   Component Value Date    WBC 6.6 01/07/2022    HGB 13.5 01/07/2022    HCT 42.2 01/07/2022    MCV 91 01/07/2022     01/07/2022    Lab Results   Component Value Date    TSH 15.20 (H) 03/01/2023        Video-Visit Details    Type of service:  Video Visit   Video Start Time: 1:15  Video End Time: 1:45    Originating Location (pt. Location): Home  Distant Location (provider location):  On-site  Platform used for Video Visit: Adrienne    I have reviewed the note as documented above.  This accurately captures the substance of my virtual visit with the patient. The patient states understanding and is agreeable with the plan.   Yasir Walter MD [facility]  Cardiology - Electrophysiology      Total time spent on patient visit, reviewing notes, imaging, labs, orders, and completing necessary documentation: 30 minutes.  >50% of visit spent on counseling patient and/or coordination of care.

## 2023-12-01 ENCOUNTER — VIRTUAL VISIT (OUTPATIENT)
Dept: CARDIOLOGY | Facility: CLINIC | Age: 43
End: 2023-12-01
Attending: STUDENT IN AN ORGANIZED HEALTH CARE EDUCATION/TRAINING PROGRAM
Payer: COMMERCIAL

## 2023-12-01 VITALS — HEIGHT: 64 IN | WEIGHT: 155 LBS | BODY MASS INDEX: 26.46 KG/M2

## 2023-12-01 VITALS — WEIGHT: 155 LBS | BODY MASS INDEX: 25.83 KG/M2 | HEIGHT: 65 IN

## 2023-12-01 DIAGNOSIS — I42.8 ARRHYTHMOGENIC LEFT VENTRICULAR DYSPLASIA (H): ICD-10-CM

## 2023-12-01 DIAGNOSIS — I42.8 ARRHYTHMOGENIC RIGHT VENTRICULAR CARDIOMYOPATHY (H): ICD-10-CM

## 2023-12-01 PROCEDURE — 99215 OFFICE O/P EST HI 40 MIN: CPT | Mod: VID | Performed by: STUDENT IN AN ORGANIZED HEALTH CARE EDUCATION/TRAINING PROGRAM

## 2023-12-01 PROCEDURE — 99214 OFFICE O/P EST MOD 30 MIN: CPT | Mod: VID | Performed by: INTERNAL MEDICINE

## 2023-12-01 RX ORDER — LIDOCAINE 40 MG/G
CREAM TOPICAL
Status: CANCELLED | OUTPATIENT
Start: 2023-12-01

## 2023-12-01 RX ORDER — SODIUM CHLORIDE 9 MG/ML
INJECTION, SOLUTION INTRAVENOUS CONTINUOUS
Status: CANCELLED | OUTPATIENT
Start: 2023-12-01

## 2023-12-01 ASSESSMENT — PAIN SCALES - GENERAL
PAINLEVEL: NO PAIN (0)
PAINLEVEL: NO PAIN (0)

## 2023-12-01 NOTE — NURSING NOTE
Is the patient currently in the state of [place]? YES    Visit mode:VIDEO    If the visit is dropped, the patient can be reconnected by: VIDEO VISIT: Text to cell phone:   Telephone Information:   Mobile [phone]       Will anyone else be joining the visit? NO  (If patient encounters technical issues they should call [phone])    How would you like to obtain your AVS? MyChart    Are changes needed to the allergy or medication list? Yes pt is not taking Levothyroxine 25mcg per pt    Reason for visit: Consult    No other vitals to report per pt    Josee VERAF

## 2023-12-01 NOTE — PATIENT INSTRUCTIONS
You were seen today in the Adult Congenital and Cardiovascular Genetics Clinic at the [facility].    Cardiology Providers you saw during your visit:  Yasir Walter MD and Carlos Alberto Concepcion MD    Diagnosis:  ARVC gene    Results:  Yasir Walter MD and Carlos Alberto Concepcion MD reviewed the results of your previous testing today in clinic.    Recommendations for you:    We will get you scheduled for an ILR removal.       General Cardiac Recommendations:  Continue to eat a heart healthy, low salt diet.  Continue to get 20-30 minutes of aerobic activity, 4-5 days per week.  Examples of aerobic activity include walking, running, swimming, cycling, etc.  Continue to observe good oral hygiene, with regular dental visits.      SBE prophylaxis:   Yes____  No__x__    If YES is checked, follow the recommendations outlined below:  Take antibiotic(s) prior to recommended dental procedures and procedures on the respiratory tract or with infected skin, muscle or bones. SBE prophylaxis is not needed for routine GI and  procedures (ie. Colonoscopy or vaginal delivery)  Observe good oral hygiene daily, as advised by your dentist. Get regular professional dental care.  Keep cuts clean.  Infections should be treated promptly.  Symptoms of Infective Endocarditis could include: fever lasting more than 4-5 days or a recurrent fever that initially resolves but returns within 1-2 days)      Exercise restrictions:   Yes__X__  No____         If yes, list restrictions:  Must be allowed to rest if fatigued or SOB      FASTING CHOLESTEROL was checked in the last 5 years YES__x__  NO___ (2019)  If no, please follow up with your primary care physician. You should have a cholesterol screening every 5 years.      Follow-up:  Follow up with Dr. Concepcion in 1 year with 7 day event monitor prior. You will likely need a stress test and cardiac MRI in 2026, we will discuss that at your follow up in a year.     Follow up with Dr. Walter as needed.     If you  have questions or concerns please contact us at:    Jennifer Medrano RN, BSN    Christine Torres (Scheduling)  Nurse Care Coordinator     Clinic   Adult Congenital and CV Genetics   Adult Congenital and CV Genetic  [facility]  (P) [phone]     (P) [phone]  (F) [phone]     (F) [phone]      For after hours urgent needs, call [phone] and ask to speak to the Adult Congenital Physician on call.  Mention Job Code 0401.    For emergencies call 911.    [facility]  [facility]  Mail Code [id number]  [address] [place]  [address]

## 2023-12-01 NOTE — LETTER
12/1/2023      RE: Maren Bee  [address] [place] [address]       Dear Colleague,    Thank you for the opportunity to participate in the care of your patient, Maren Bee, at the [facility] [place] [facility]. Please see a copy of my visit note below.    Virtual Visit Details    Type of service:  Video Visit   Start time: 12:36  End time: 1:08  Originating Location (pt. Location): Home    Distant Location (provider location):  On-site  Platform used for Video Visit: [facility]            Cardiovascular Genetics Clinic      Chief Complaint: Establish CV genetics care    HPI (12/1/23): Maren Bee is a 43 year old female with a past medical history of arrhythmogenic cardiomyopathy and a family history of sudden cardiac death (aborted SCD in brother) who was referred to me to establish cardiovascular genetics care. She last saw my colleague Dr. Medrano in 2022.     Briefly, Ms. Bee's brother had an aborted sudden cardiac death while playing basketball. His CMR showed ARVC and he had a secondary prevention ICD. She had a CMR in 2012 that was not felt to be remarkable, but then she was found to have LV involvement upon repeat review at a later date. She had a ILR in 2020 for arrhythmic surveillance/risk stratification. A there last visit with Esther Walter and Mitchell, it was determined that Ms. Bee would consider ILR removal upon the replacement alert for the battery.    At the time of the consultation, Mrs. Bee had COVID. Otherwise, she continues to live a full life and works as a Genetic Counselor.  She also noted an absence of chest pain at rest, dyspnea at rest or with exertion, PND, orthopnea, peripheral edema, palpitations, lightheadedness or syncope.     A comprehensive ROS was done and the details are included above in the HPI.    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  Last CMR: 2022 normal biventricular function with circumferential subepciardial involvement  Last ambulatory ECG monitor: Linq in 2023 that had reached RRT.   Last CPEX: 2022 with VO2 max 33 ml/kg/min (108% predicted) with VE/VCO2 slope 23.6, RER 1.14    Past Medical History:  Genetic cardiomyopathy  - No prior history of hypertension, T2DM, dyslipidemia, CAD, TIA/stroke, vascular aneurysms, PAD  Past Medical History:   Diagnosis Date    Cardiomyopathy, familial (H) 7/18/2012    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     Clinical diagnosis of COVID-19 11/10/2020    Positive test following illness and loss of taste and smell. 11/10/20     Hypothyroidism        Past Surgical History:    Past Surgical History:   Procedure Laterality Date    DENTAL SURGERY      EP LOOP RECORDER IMPLANT N/A 1/24/2020    Procedure: EP LOOP RECORDER IMPLANT;  Surgeon: Jasbir Gordillo MD;  Location:  HEART CARDIAC CATH LAB    Strabismus Surgery         Drug History:  Home cardiac meds: Atorvastatin 20 mg q24h, metoprolol succinate 100 mg q24h.   Current Outpatient Medications   Medication Sig Dispense Refill    atorvastatin (LIPITOR) 20 MG tablet Take 1 tablet (20 mg) by mouth daily 90 tablet 3    Cholecalciferol (VITAMIN D3) 1000 units CAPS Take 1,000 Units by mouth daily      levothyroxine (SYNTHROID/LEVOTHROID) 200 MCG tablet Take 1 tablet (200 mcg) by mouth daily Take in addition to 25 mcg tablet for a total of 225 mcg daily. 90 tablet 3    levothyroxine (SYNTHROID/LEVOTHROID) 25 MCG tablet Take 1 tablet (25 mcg) by mouth daily Take in addition to 200 mcg tablet for a total of 225 mcg daily. 90 tablet 0    metoprolol succinate ER (TOPROL XL) 100 MG 24 hr tablet Take 1 tablet (100 mg) by mouth daily 90 tablet 0    metroNIDAZOLE (METROGEL) 0.75 % vaginal gel Place 1 applicator (5 g) vaginally twice a week for 48 doses Start Metrogel after  completing the course of oral metronidazole to prevent recurrence. 120 g 1         Family History:    Family History   Problem Relation Age of Onset    Other - See Comments Mother 58        hyperlipidemia, hypothyroidism, ARVD gene +    Other - See Comments Father         hyperlipidemia, hypothyroidism    Arrhythmia Father         pacemaker    Other - See Comments Brother 32        (Amari) ARVD s/p sudden cardiac arrest    Cardiovascular Brother 30        (Young) MI at age 30; type 1 DM; ARVD gene negative;     Diabetes Type 1 Brother     Lupus Maternal Grandmother     Colon Cancer Maternal Grandmother 80    Cancer Maternal Grandfather         Bladder    Diabetes Daughter 3        type I    Other - See Comments Other         Alcohol Abuse       Social History:    Social History     Tobacco Use    Smoking status: Former     Years: 2     Types: Cigarettes     Quit date: 4/10/2001     Years since quittin.6    Smokeless tobacco: Never   Substance Use Topics    Alcohol use: Yes     Alcohol/week: 2.0 standard drinks of alcohol     Types: 2 Standard drinks or equivalent per week       No Known Allergies      Physical Examination:  Vitals: There were no vitals taken for this visit.  BMI= There is no height or weight on file to calculate BMI.    GENERAL: Healthy, alert and no distress  Eyes: No xanthelasmas.  NECK: No palpable neck masses/goitre and no evidence of retrosternal goitre.   ENT: Moist mucosal membranes.  RESPIRATORY: No signs of resp distress. Lungs CTAB.  CARDIOVASCULAR:  No JVD, regular, normal S1+S2 without added sounds or murmurs.  ABDOMEN: ND, soft, non-tender, normal bowel sounds.  EXTREMITIES: Warm, well-perfused, no edema.   NEUROLOGY: GCS 15/15, no focal deficits.  VASC: No carotid bruits. Carotid, radial, brachial, popliteal, dorsalis pedis and posterior tibialis pulses are normal in volumes and symmetric bilaterally.   SKIN: No ecchymoses, no rashes.  PSYCH: Cooperative, pleasant affect.  "      Investigations:  I personally viewed and interpreted the following investigations:    Labs:    CBC RESULTS:  Lab Results   Component Value Date    WBC 6.6 01/07/2022    WBC 6.3 06/30/2020    RBC 4.65 01/07/2022    RBC 4.46 06/30/2020    HGB 13.5 01/07/2022    HGB 13.0 04/07/2021    HCT 42.2 01/07/2022    HCT 40.6 04/07/2021    MCV 91 01/07/2022    MCV 90.8 04/07/2021    MCH 29.0 01/07/2022    MCH 29.1 04/07/2021    MCHC 32.0 01/07/2022    MCHC 32.0 04/07/2021    RDW 13.3 01/07/2022    RDW 12.4 04/07/2021     01/07/2022     06/30/2020       CMP RESULTS:  Lab Results   Component Value Date     03/01/2023     04/07/2021    POTASSIUM 4.5 03/01/2023    POTASSIUM 4.0 01/07/2022    POTASSIUM 4.1 04/07/2021    CHLORIDE 103 03/01/2023    CHLORIDE 106 01/07/2022    CHLORIDE 104 04/07/2021    CO2 24 03/01/2023    CO2 28 01/07/2022    CO2 27 04/07/2021    ANIONGAP 12 03/01/2023    ANIONGAP 5 01/07/2022    ANIONGAP 6 04/07/2021    GLC 94 03/01/2023    GLC 86 01/07/2022    GLC 81 04/07/2021    BUN 12.4 03/01/2023    BUN 12 01/07/2022    BUN 14 04/07/2021    CR 0.79 03/01/2023    CR 0.74 04/07/2021    GFRESTIMATED >90 03/01/2023    GFRESTIMATED >90 04/07/2021    GFRESTBLACK >90 04/07/2021    ADINA 9.7 03/01/2023    ADINA 9.4 04/07/2021    BILITOTAL 0.4 03/01/2023    BILITOTAL 0.3 04/07/2021    ALBUMIN 4.6 03/01/2023    ALBUMIN 4.2 01/07/2022    ALBUMIN 4.0 04/07/2021    ALKPHOS 71 03/01/2023    ALKPHOS 68 04/07/2021    ALT 22 03/01/2023    ALT 31 04/07/2021    AST 23 03/01/2023    AST 22 04/07/2021        INR RESULTS:  No results found for: \"INR\"    BNP RESULTS:  No results found for: \"NTBNPI\"  No results found for: \"BNP\"    LIPIDS:  Lab Results   Component Value Date    CHOL 240 03/01/2023    CHOL 194.0 05/26/2020     Lab Results   Component Value Date    HDL 76 03/01/2023    HDL 71.0 05/26/2020     Lab Results   Component Value Date     03/01/2023    .0 05/26/2020    LDL 74 07/30/2013 "     Lab Results   Component Value Date    TRIG 53 03/01/2023    TRIG 70.0 05/26/2020     Lab Results   Component Value Date    CHOLHDLRATIO 2.7 05/26/2020         Assessment and Plan:   Maren Bee is a 43 year old female with a past medical history of genetic cardiomyopathy with a accompanying familial DSP gene mutation who presented to me in December 2023 establishment of cardiovascular care.    I was reassured to see that Ms. Bee has maintained full life and excellent functional status while  her implantable loop recorder has been completely devoid of high risk ventricular arrhythmias in the 3 years that it has been in situ.  In view of this, we talked about proceeding with a plan to explant this ILR, which she will discuss with Dr. Walter later today. Otherwise, given the stability of her CMRs and functional status, I think it is reasonable to lessen the frequency of her follow-up to ~18 months with an ECG at each visit and 14-day ambulatory ECG monitoring preceding each visit. Her next CMR can be done in 2026.     Ms. Bee did also ask about the possibility of extended screening as well as autoantibody testing for her ARVC.  I did mention to her that the extending screening is unlikely to lead to any new information given that she has her familial mutation and that most panels only have had a modest increase in the number of genes available since she had her testing in 2012.  Moreover, there is no commercially available autoantibody testing for arrhythmogenic/genetic cardiomyopathy and the clinical data supporting the experimental autoantibody testing is somewhat scant.  I also reviewed this with my other colleagues in the cardiovascular genetics team, who agreed.    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       Carlos Alberto Concepcion [facility], [place]    Cardiovascular Division  Pager [phone]    CC  Patient Care Team:  Marcy Anthony MD as PCP - General (Family Medicine)  Brianna Parra RN as Specialty Care Coordinator (Cardiology)  Yasir Walter MD as Assigned Heart and Vascular Provider  Betty Ashraf PA-C as Assigned PCP

## 2023-12-01 NOTE — LETTER
12/1/2023      RE: Maren Bee  [address] [place] [address]       Dear Colleague,    Thank you for the opportunity to participate in the care of your patient, Maren Bee, at the [facility] [place] at [facility]. Please see a copy of my visit note below.          CV GENETICS ELECTROPHYSIOLOGY VIRTUAL CLINIC VISIT    Assessment/Recommendations   Assessment/Plan:    Ms. Maren Bee is a 43 year old female who comes in today for EP consultation regarding DSP mutation. 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 She has a ILR with no arrhythmias recorded.    The patient seems to have a stable cardiomyopathy with stable normal EF and no progression in LGE burden. She does not seem to have high risk features so far. We discuss restrain form competitive or endurance sports to decrease her chances of disease progression. She currently does not have an indication for ICD. We will have the ILR removed at the end of life of its battery. We will discuss replacing the ILR at that time. We are in favor of not replacing it unless new symptoms happen or progression of the scar process. Given CMRs have been stable and no changes in symptoms, we will not reimplant.     Follow-up with EP as needed  Follow up with Dr Concepcion in one year  MRI every 4 years unless new symptoms       History of Present Illness/Subjective    Ms. Maren Bee is a 43 year old female who comes in today for EP consultation regarding DSP mutation..    Ms. 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  Her brother suffered sudden cardiac death in 2012 while playing basketball.  He was successfully resuscitated.  CMR showed ARVD and he had secondary prevention ICD placed.  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  This prompted familial evaluation.  Patient also then went on to find  [employer]  [facility] EMERGENCY DEPT    Date: 11/8/2020  Patient Name: Latisha Barnard    History of Presenting Illness     Chief Complaint   Patient presents with    Medication Refill     38 y.o. male with a past medical history of Hypertension presents the ED for medication refill. Patient states he has been out of his lisinopril 10 mg daily x4 days. He is otherwise asymptomatic, states he \"feels like a headache is coming on\" but does not yet have a headache. Patient denies any other associated signs or symptoms. Patient denies any other complaints. Nursing notes regarding the HPI and triage nursing notes were reviewed. Prior medical records were reviewed. Current Outpatient Medications   Medication Sig Dispense Refill    lisinopriL (PRINIVIL, ZESTRIL) 10 mg tablet Take 1 Tab by mouth daily for 30 days. 30 Tab 0    meloxicam (MOBIC) 15 mg tablet TAKE 1 TABLET BY MOUTH DAILY FOR GOUT 90 Tab 0    lidocaine 5 % topical cream Apply  to affected area two (2) times daily as needed for Pain. 1 Tube 0    allopurinoL (ZYLOPRIM) 100 mg tablet TAKE 1 TABLET BY MOUTH DAILY FOR GOUT 90 Tab 1    lidocaine (LIDOCAINE VISCOUS) 2 % solution TAKE 15 ML BY MOUTH EVERY 4 HOURS AS NEEDED FOR PAIN FOR MOUTH IRRITATION 100 mL 0    valACYclovir (VALTREX) 1 gram tablet Take 2 Tabs by mouth two (2) times a day. Indications: HERPES LABIALIS 4 Tab 5       Past History     Past Medical History:  Past Medical History:   Diagnosis Date    Gout     Hypertension        Past Surgical History:  No past surgical history on file.     Family History:  Family History   Problem Relation Age of Onset    Heart Disease Mother     Heart Disease Maternal Grandfather     Stroke Maternal Grandfather     Hypertension Maternal Grandfather     Breast Cancer Paternal Grandmother     Heart Attack Paternal Grandfather        Social History:  Social History     Tobacco Use    Smoking status: Never Smoker    Smokeless tobacco: Never Used Substance Use Topics    Alcohol use: No     Alcohol/week: 0.0 standard drinks    Drug use: No       Allergies:  No Known Allergies    Patient's primary care provider (as noted in EPIC):  Mario Wade NP    Review of Systems   Constitutional:  Denies malaise, fever, chills. Head:  Denies injury. Cardiac:  Denies chest pain  Respiratory:  Denies cough, wheezing, difficulty breathing, shortness of breath. Neuro: + \"feels like a headache coming on\" but does not yet have a headache. Denies LOC, dizziness, neurologic symptoms/deficits/paresthesias. Skin: Denies injury, rash, itching or skin changes. All other systems negative as reviewed. Visit Vitals  BP (!) 131/93 (BP 1 Location: Right arm, BP Patient Position: At rest)   Pulse 82   Temp 98.2 °F (36.8 °C)   Resp 16   Ht 5' 11\" (1.803 m)   Wt 90.7 kg (200 lb)   SpO2 98%   BMI 27.89 kg/m²       PHYSICAL EXAM:    CONSTITUTIONAL:  Alert, in no apparent distress;  well developed;  well nourished. HEAD:  Normocephalic, atraumatic. EYES:  EOMI. Non-icteric sclera. Normal conjunctiva. ENTM:  Mouth: mucous membranes moist.  NECK:  Supple  RESPIRATORY:  Chest clear, equal breath sounds, good air movement. Without wheezes, rhonchi or rales. CARDIOVASCULAR:  Regular rate and rhythm. No murmurs, rubs, or gallops. UPPER EXT:  Normal inspection. NEURO:  Moves all four extremities, and grossly normal motor exam.  SKIN:  No rashes;  Normal for age. PSYCH:  Alert and normal affect. IMPRESSION AND MEDICAL DECISION MAKING:  Based upon the patient's presentation with noted HPI and PE, along with the work up done in the emergency department, the patient is seeking a medication refill. Lisinopril 10 mg daily was sent to his pharmacy of choice. Pt to f/u with PCP. Diagnosis:   1. Medication refill    2.  Essential hypertension with goal blood pressure less than 140/90      Disposition: Discharge    Follow-up Information     Follow up With that she had the genetic mutation.  CMR in 2012 did not reveal any significant findings.  Holter was also unremarkable.  Patient's mother also has CMR which showed left-sided (septal) involvement of ARVC.  Patient was reevaluated and per CMR's were felt to also have left-sided involvement of ARVD.  Patient had ILR placed in 1/2020.  No arrhythmias have been recorded on ILR thus far.    EP Visit 7/1/2022: She reports feeling well and has no complaints.. She denies chest discomfort, palpitations, abdominal fullness/bloating or peripheral edema, shortness of breath, paroxysmal nocturnal dyspnea, orthopnea, lightheadedness, dizziness, pre-syncope, or syncope. ILR interrogation shows no arrhythmias. Current cardiac medications include: Toprol-XL and Lipitor. She has 2 kids, genetically tested and negative She also has 3 neices tested and have it. She had a repeat CMR today showing stable normal LVEF and no change in LGE pattern compared to 2018.    She presents today for follow up. ILR is at RRT and she is here to discuss removal and if replacement is needed. She reports feeling well. No new symptoms. She is establishing with Dr. Concepcion today. She denies chest discomfort, palpitations, abdominal fullness/bloating or peripheral edema, shortness of breath, paroxysmal nocturnal dyspnea, orthopnea, lightheadedness, dizziness, pre-syncope, or syncope. Current cardiac medications include: Toprol XL and Lipitor.       CARDIAC HISTORY: Pt's brother suffered sudden cardiac death in 2012 while playing basketball - he was successfully resuscitated with AED. MRI confirmed ARVD and he had an ICD placed, and [id number] frameshift mutation identified and likely disease-causing.  After patient was found to be mutation positive she underwent cardiac MRI in 2012 which did not reveal any significant findings for right sided involvement of ARVC.  Holter was also unremarkable.  Later patient's mother had MRI and also found to have left-sided  (septal) involvement of ARVC.  Given this in addition to carefully examining cardiac MRI from 2015 for left sided involvement of ARVC, past MRI was also reexamined and patient was noted to also have left-sided involvement of ARVC.   She has 2 kids both tested negative for mutation. She has her brother who is the index case, has an ICD, and her sister is negative.    I have reviewed and updated the patient's Past Medical History, Social History, Family History and Medication List.     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  Comparison CMR: 04/05/2012, 06/10/2013, 07/01/2014, 12/04/2015, 06/22/2018  1. The LV is normal in cavity size and wall thickness. The global systolic function is normal. The LVEF is 58%. There are no regional wall motion abnormalities.  2. The RV is normal in cavity size. The global systolic function is normal. The RVEF is 60%. There is no regional RV akinesia, dyskinesia, or dyssynchronous RV contraction. There are no findings to satisfy the CMR criteria for the diagnosis of ARVC as per the 2010 Task Force criteria.  3. Both atria are normal in size.  4. There is no significant valvular disease.   5. Late gadolinium enhancement imaging shows near-circumferential subepicardial hyperenhancement in  A ring-like pattern, which matches some of the areas of fatty replacement seen on other imaging. This pattern of hyperenhancement is typical for DSP cardiomyopathy.  6. There is no pericardial effusion or thickening.  CONCLUSIONS: Desmoplakin cardiomyopathy with LV involvement based on tissue characterization by LGE imaging, and no RV involvement. There have been no significant changes when compared to the prior studies dating back to a decade ago.    2018 CMR:  1. The LV is normal in cavity size and wall thickness. The global systolic function  Specialties Details Why Contact Info    Caron Ann NP Nurse Practitioner In 3 days  [address]  [place] [address]  [phone]      [facility] EMERGENCY DEPT Emergency Medicine  If symptoms worsen [address] [facility] [address]  [phone]          Patient's Medications   Start Taking    No medications on file   Continue Taking    ALLOPURINOL (ZYLOPRIM) 100 MG TABLET    TAKE 1 TABLET BY MOUTH DAILY FOR GOUT    LIDOCAINE (LIDOCAINE VISCOUS) 2 % SOLUTION    TAKE 15 ML BY MOUTH EVERY 4 HOURS AS NEEDED FOR PAIN FOR MOUTH IRRITATION    LIDOCAINE 5 % TOPICAL CREAM    Apply  to affected area two (2) times daily as needed for Pain. MELOXICAM (MOBIC) 15 MG TABLET    TAKE 1 TABLET BY MOUTH DAILY FOR GOUT    VALACYCLOVIR (VALTREX) 1 GRAM TABLET    Take 2 Tabs by mouth two (2) times a day. Indications: HERPES LABIALIS   These Medications have changed    Modified Medication Previous Medication    LISINOPRIL (PRINIVIL, ZESTRIL) 10 MG TABLET lisinopriL (PRINIVIL, ZESTRIL) 10 mg tablet       Take 1 Tab by mouth daily for 30 days.     TAKE 1 TABLET BY MOUTH ONCE DAILY FOR HIGH BLOOD PRESSURE   Stop Taking    No medications on file     MAX Larson "is mildly reduced. The LVEF is 52%. There are no regional wall motion abnormalities.  2. The RV is normal in cavity size. The global systolic function is normal. The RVEF is 61%. There is no regional RV akinesia, dyskinesia or dyssynchronous RV contraction. There are no findings to satisfy CMR criteria for the diagnosis of ARVC as per the 2010 Task Force criteria.  3. Both atria are normal in size.  4. There is no significant valvular disease.   5. Late gadolinium enhancement imaging shows epicardial hyperenhancement of the right ventricular aspect of the mid inferoseptal segment, mid-myocardial hyperenhancement of the apical septal segment and epicardial hyperenhancement of the mid anterolateral segment. These match the areas of fatty replacement on other imaging.  6. Regadenoson perfusion imaging shows no ischemia.  7. There is no pericardial effusion or thickening.  CONCLUSIONS: Arrhythmogenic cardiomyopathy with left ventricular involvement based on tissue characterization by delayed enhancement imaging, and no right ventricular involvement based on 2010 Task Force criteria. There have been no changes when compared to the prior studies dating back to 04/05/2012. No ischemia.          Physical Examination   Ht 1.638 m (5' 4.5\")   Wt 70.3 kg (155 lb)   BMI 26.19 kg/m      The rest of a comprehensive physical examination is deferred due to nature of video visit restrictions.  CONSITUTIONAL: no acute distress  HEENT: no icterus, no redness or discharge, neck supple  CV: no visible edema of visualized extremities. No JVD.   RESPIRATORY: respirations nonlabored, no cough  NEURO: AA&Ox3, speech fluent/appropriate, motor grossly nonfocal  PSYCH: cooperative, affect appropriate  DERM: no rashes on visualized face/neck/upper extremities         Medications  Allergies   Current Outpatient Medications   Medication Sig Dispense Refill    atorvastatin (LIPITOR) 20 MG tablet Take 1 tablet (20 mg) by mouth daily 90 tablet 3 "    Cholecalciferol (VITAMIN D3) 1000 units CAPS Take 1,000 Units by mouth daily      levothyroxine (SYNTHROID/LEVOTHROID) 200 MCG tablet Take 1 tablet (200 mcg) by mouth daily Take in addition to 25 mcg tablet for a total of 225 mcg daily. 90 tablet 3    levothyroxine (SYNTHROID/LEVOTHROID) 25 MCG tablet Take 1 tablet (25 mcg) by mouth daily Take in addition to 200 mcg tablet for a total of 225 mcg daily. 90 tablet 0    metoprolol succinate ER (TOPROL XL) 100 MG 24 hr tablet Take 1 tablet (100 mg) by mouth daily 90 tablet 0    metroNIDAZOLE (METROGEL) 0.75 % vaginal gel Place 1 applicator (5 g) vaginally twice a week for 48 doses Start Metrogel after completing the course of oral metronidazole to prevent recurrence. 120 g 1    No Known Allergies      Lab Results (Personally Reviewed)    Chemistry/lipid CBC Cardiac Enzymes/BNP/TSH/INR   Lab Results   Component Value Date    BUN 12.4 03/01/2023     03/01/2023    CO2 24 03/01/2023     Creatinine   Date Value Ref Range Status   03/01/2023 0.79 0.51 - 0.95 mg/dL Final   04/07/2021 0.74 0.52 - 1.04 mg/dL Final       Lab Results   Component Value Date    CHOL 240 (H) 03/01/2023    HDL 76 03/01/2023     (H) 03/01/2023      Lab Results   Component Value Date    WBC 6.6 01/07/2022    HGB 13.5 01/07/2022    HCT 42.2 01/07/2022    MCV 91 01/07/2022     01/07/2022    Lab Results   Component Value Date    TSH 15.20 (H) 03/01/2023        Video-Visit Details    Type of service:  Video Visit   Video Start Time: 1:15  Video End Time: 1:45    Originating Location (pt. Location): Home  Distant Location (provider location):  On-site  Platform used for Video Visit: Adrienne    I have reviewed the note as documented above.  This accurately captures the substance of my virtual visit with the patient. The patient states understanding and is agreeable with the plan.   Yasir Walter MD [facility]  Cardiology - Electrophysiology      Total time spent on patient visit,  reviewing notes, imaging, labs, orders, and completing necessary documentation: 30 minutes.  >50% of visit spent on counseling patient and/or coordination of care.        Please do not hesitate to contact me if you have any questions/concerns.     Sincerely,     Yasir Walter MD

## 2023-12-01 NOTE — PROGRESS NOTES
Virtual Visit Details    Type of service:  Video Visit   Start time: 12:36  End time: 1:08  Originating Location (pt. Location): Home    Distant Location (provider location):  On-site  Platform used for Video Visit: [facility]            Cardiovascular Genetics Clinic      Chief Complaint: Establish CV genetics care    HPI (12/1/23): Maren Bee is a 43 year old female with a past medical history of arrhythmogenic cardiomyopathy and a family history of sudden cardiac death (aborted SCD in brother) who was referred to me to establish cardiovascular genetics care. She last saw my colleague Dr. Medrano in 2022.     Briefly, Ms. Bee's brother had an aborted sudden cardiac death while playing basketball. His CMR showed ARVC and he had a secondary prevention ICD. She had a CMR in 2012 that was not felt to be remarkable, but then she was found to have LV involvement upon repeat review at a later date. She had a ILR in 2020 for arrhythmic surveillance/risk stratification. A there last visit with Esther Walter and Mitchell, it was determined that Ms. Bee would consider ILR removal upon the replacement alert for the battery.    At the time of the consultation, Mrs. Bee had COVID. Otherwise, she continues to live a full life and works as a Genetic Counselor.  She also noted an absence of chest pain at rest, dyspnea at rest or with exertion, PND, orthopnea, peripheral edema, palpitations, lightheadedness or syncope.     A comprehensive ROS was done and the details are included above in the HPI.    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  Last CMR: 2022 normal biventricular function with circumferential subepciardial involvement  Last ambulatory ECG monitor: Linq in 2023 that had reached RRT.   Last CPEX: 2022 with VO2 max 33 ml/kg/min (108% predicted) with VE/VCO2 slope 23.6, RER 1.14    Past Medical  History:  Genetic cardiomyopathy  - No prior history of hypertension, T2DM, dyslipidemia, CAD, TIA/stroke, vascular aneurysms, PAD  Past Medical History:   Diagnosis Date    Cardiomyopathy, familial (H) 7/18/2012    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     Clinical diagnosis of COVID-19 11/10/2020    Positive test following illness and loss of taste and smell. 11/10/20     Hypothyroidism        Past Surgical History:    Past Surgical History:   Procedure Laterality Date    DENTAL SURGERY      EP LOOP RECORDER IMPLANT N/A 1/24/2020    Procedure: EP LOOP RECORDER IMPLANT;  Surgeon: Jasbir Gordillo MD;  Location:  HEART CARDIAC CATH LAB    Strabismus Surgery         Drug History:  Home cardiac meds: Atorvastatin 20 mg q24h, metoprolol succinate 100 mg q24h.   Current Outpatient Medications   Medication Sig Dispense Refill    atorvastatin (LIPITOR) 20 MG tablet Take 1 tablet (20 mg) by mouth daily 90 tablet 3    Cholecalciferol (VITAMIN D3) 1000 units CAPS Take 1,000 Units by mouth daily      levothyroxine (SYNTHROID/LEVOTHROID) 200 MCG tablet Take 1 tablet (200 mcg) by mouth daily Take in addition to 25 mcg tablet for a total of 225 mcg daily. 90 tablet 3    levothyroxine (SYNTHROID/LEVOTHROID) 25 MCG tablet Take 1 tablet (25 mcg) by mouth daily Take in addition to 200 mcg tablet for a total of 225 mcg daily. 90 tablet 0    metoprolol succinate ER (TOPROL XL) 100 MG 24 hr tablet Take 1 tablet (100 mg) by mouth daily 90 tablet 0    metroNIDAZOLE (METROGEL) 0.75 % vaginal gel Place 1 applicator (5 g) vaginally twice a week for 48 doses Start Metrogel after completing the course of oral metronidazole to prevent recurrence. 120 g 1         Family History:    Family History   Problem Relation Age of Onset    Other - See Comments Mother 58        hyperlipidemia, hypothyroidism, ARVD gene +    Other - See Comments Father         hyperlipidemia, hypothyroidism    Arrhythmia Father         pacemaker     Other - See Comments Brother 32        (Amari) ARVD s/p sudden cardiac arrest    Cardiovascular Brother 30        (Young) MI at age 30; type 1 DM; ARVD gene negative;     Diabetes Type 1 Brother     Lupus Maternal Grandmother     Colon Cancer Maternal Grandmother 80    Cancer Maternal Grandfather         Bladder    Diabetes Daughter 3        type I    Other - See Comments Other         Alcohol Abuse       Social History:    Social History     Tobacco Use    Smoking status: Former     Years: 2     Types: Cigarettes     Quit date: 4/10/2001     Years since quittin.6    Smokeless tobacco: Never   Substance Use Topics    Alcohol use: Yes     Alcohol/week: 2.0 standard drinks of alcohol     Types: 2 Standard drinks or equivalent per week       No Known Allergies      Physical Examination:  Vitals: There were no vitals taken for this visit.  BMI= There is no height or weight on file to calculate BMI.    GENERAL: Healthy, alert and no distress  Eyes: No xanthelasmas.  NECK: No palpable neck masses/goitre and no evidence of retrosternal goitre.   ENT: Moist mucosal membranes.  RESPIRATORY: No signs of resp distress. Lungs CTAB.  CARDIOVASCULAR:  No JVD, regular, normal S1+S2 without added sounds or murmurs.  ABDOMEN: ND, soft, non-tender, normal bowel sounds.  EXTREMITIES: Warm, well-perfused, no edema.   NEUROLOGY: GCS 15/15, no focal deficits.  VASC: No carotid bruits. Carotid, radial, brachial, popliteal, dorsalis pedis and posterior tibialis pulses are normal in volumes and symmetric bilaterally.   SKIN: No ecchymoses, no rashes.  PSYCH: Cooperative, pleasant affect.       Investigations:  I personally viewed and interpreted the following investigations:    Labs:    CBC RESULTS:  Lab Results   Component Value Date    WBC 6.6 2022    WBC 6.3 2020    RBC 4.65 2022    RBC 4.46 2020    HGB 13.5 2022    HGB 13.0 2021    HCT 42.2 2022    HCT 40.6 2021    MCV 91 2022  "   MCV 90.8 04/07/2021    MCH 29.0 01/07/2022    MCH 29.1 04/07/2021    MCHC 32.0 01/07/2022    MCHC 32.0 04/07/2021    RDW 13.3 01/07/2022    RDW 12.4 04/07/2021     01/07/2022     06/30/2020       CMP RESULTS:  Lab Results   Component Value Date     03/01/2023     04/07/2021    POTASSIUM 4.5 03/01/2023    POTASSIUM 4.0 01/07/2022    POTASSIUM 4.1 04/07/2021    CHLORIDE 103 03/01/2023    CHLORIDE 106 01/07/2022    CHLORIDE 104 04/07/2021    CO2 24 03/01/2023    CO2 28 01/07/2022    CO2 27 04/07/2021    ANIONGAP 12 03/01/2023    ANIONGAP 5 01/07/2022    ANIONGAP 6 04/07/2021    GLC 94 03/01/2023    GLC 86 01/07/2022    GLC 81 04/07/2021    BUN 12.4 03/01/2023    BUN 12 01/07/2022    BUN 14 04/07/2021    CR 0.79 03/01/2023    CR 0.74 04/07/2021    GFRESTIMATED >90 03/01/2023    GFRESTIMATED >90 04/07/2021    GFRESTBLACK >90 04/07/2021    ADINA 9.7 03/01/2023    ADINA 9.4 04/07/2021    BILITOTAL 0.4 03/01/2023    BILITOTAL 0.3 04/07/2021    ALBUMIN 4.6 03/01/2023    ALBUMIN 4.2 01/07/2022    ALBUMIN 4.0 04/07/2021    ALKPHOS 71 03/01/2023    ALKPHOS 68 04/07/2021    ALT 22 03/01/2023    ALT 31 04/07/2021    AST 23 03/01/2023    AST 22 04/07/2021        INR RESULTS:  No results found for: \"INR\"    BNP RESULTS:  No results found for: \"NTBNPI\"  No results found for: \"BNP\"    LIPIDS:  Lab Results   Component Value Date    CHOL 240 03/01/2023    CHOL 194.0 05/26/2020     Lab Results   Component Value Date    HDL 76 03/01/2023    HDL 71.0 05/26/2020     Lab Results   Component Value Date     03/01/2023    .0 05/26/2020    LDL 74 07/30/2013     Lab Results   Component Value Date    TRIG 53 03/01/2023    TRIG 70.0 05/26/2020     Lab Results   Component Value Date    CHOLHDLRATIO 2.7 05/26/2020         Assessment and Plan:   Maren Bee is a 43 year old female with a past medical history of genetic cardiomyopathy with a accompanying familial DSP gene mutation who presented to me in " December 2023 establishment of cardiovascular care.    I was reassured to see that Ms. Bee has maintained full life and excellent functional status while  her implantable loop recorder has been completely devoid of high risk ventricular arrhythmias in the 3 years that it has been in situ.  In view of this, we talked about proceeding with a plan to explant this ILR, which she will discuss with Dr. Walter later today. Otherwise, given the stability of her CMRs and functional status, I think it is reasonable to lessen the frequency of her follow-up to ~18 months with an ECG at each visit and 14-day ambulatory ECG monitoring preceding each visit. Her next CMR can be done in 2026.     Ms. Bee did also ask about the possibility of extended screening as well as autoantibody testing for her ARVC.  I did mention to her that the extending screening is unlikely to lead to any new information given that she has her familial mutation and that most panels only have had a modest increase in the number of genes available since she had her testing in 2012.  Moreover, there is no commercially available autoantibody testing for arrhythmogenic/genetic cardiomyopathy and the clinical data supporting the experimental autoantibody testing is somewhat scant.  I also reviewed this with my other colleagues in the cardiovascular genetics team, who agreed.    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        Carlos Alberto Concepcion, [facility], MS    Cardiovascular Division  Pager [phone]    CC  Patient Care Team:  Marcy Anthony MD as PCP - General (Family Medicine)  Brianna Parra RN as Specialty Care Coordinator (Cardiology)  Yasir Walter MD as Assigned Heart and Vascular Provider  Betty Ashraf PA-C as Assigned PCP

## 2023-12-01 NOTE — PATIENT INSTRUCTIONS
You were seen today in the Adult Congenital and Cardiovascular Genetics Clinic at the [facility].    Cardiology Providers you saw during your visit:  Yasir Walter MD and Carlos Alberto Concepcion MD    Diagnosis:  ARVC gene    Results:  Yasir Walter MD and Carlos Alberto Concepcion MD reviewed the results of your previous testing today in clinic.    Recommendations for you:    We will get you scheduled for an ILR removal.       General Cardiac Recommendations:  Continue to eat a heart healthy, low salt diet.  Continue to get 20-30 minutes of aerobic activity, 4-5 days per week.  Examples of aerobic activity include walking, running, swimming, cycling, etc.  Continue to observe good oral hygiene, with regular dental visits.      SBE prophylaxis:   Yes____  No__x__    If YES is checked, follow the recommendations outlined below:  Take antibiotic(s) prior to recommended dental procedures and procedures on the respiratory tract or with infected skin, muscle or bones. SBE prophylaxis is not needed for routine GI and  procedures (ie. Colonoscopy or vaginal delivery)  Observe good oral hygiene daily, as advised by your dentist. Get regular professional dental care.  Keep cuts clean.  Infections should be treated promptly.  Symptoms of Infective Endocarditis could include: fever lasting more than 4-5 days or a recurrent fever that initially resolves but returns within 1-2 days)      Exercise restrictions:   Yes__X__  No____         If yes, list restrictions:  Must be allowed to rest if fatigued or SOB      FASTING CHOLESTEROL was checked in the last 5 years YES__x__  NO___ (2019)  If no, please follow up with your primary care physician. You should have a cholesterol screening every 5 years.      Follow-up:  Follow up with Dr. Concepcion in 1 year with 7 day event monitor prior. You will likely need a stress test and cardiac MRI in 2026, we will discuss that at your follow up in a year.     Follow up with Dr. Walter as needed.     If you  have questions or concerns please contact us at:    Jennifer Medrano RN, BSN    Christine Torres (Scheduling)  Nurse Care Coordinator     Clinic   Adult Congenital and CV Genetics   Adult Congenital and CV Genetic  [facility]  (P) [phone]     (P) [phone]  (F) [phone]     (F) [phone]      For after hours urgent needs, call [phone] and ask to speak to the Adult Congenital Physician on call.  Mention Job Code 0401.    For emergencies call 911.    [facility]  [facility]   Clinics and Surgery Center  Mail Code [id number]  [address] [place]  [address]

## 2023-12-01 NOTE — NURSING NOTE
No other vitals to report today      Is the patient currently in the state of [place]? YES    Visit mode:VIDEO    If the visit is dropped, the patient can be reconnected by: VIDEO VISIT: Send to e-mail at: [email or web address]    Will anyone else be joining the visit? NO  (If patient encounters technical issues they should call [phone])    How would you like to obtain your AVS? MyChart    Are changes needed to the allergy or medication list? No    Patient declined individual allergy and medication review by support staff because patient denies any changes since echeck-in completion and states all information entered during echeck-in remains accurate.    Reason for visit: DANIEL PakF

## 2023-12-01 NOTE — LETTER
"    December 1, 2023       TO: Maren BENDER Bee  [address] [place] [address]         You are scheduled for a Loop Recorder Explant.      Visitor Policy: Two visitors.    Follow these instructions:     1. You should arrive at the [facility] waiting room, located in the [facility], at _______. The address is: [address] [place] [id number].   2. Nothing to eat or drink 2 hours prior to procedure.  3. The morning of this procedure, you may take your normal AM medications with a sip of water.   4. You will discharge the same day. You may drive yourself.  5. Use the antibacterial wipes the night before and morning of your procedure.   Follow the included instructions. This is to reduce surface bacteria to help prevent infection.          If you have further questions, please utilize [employer] to contact us.   If your question concerns the above instructions, contact:  Jennifer JARAMILLO RN  RN Care Coordinator -- Adult Congenital Heart Defects/CV Genetics  P: [phone]  F: [phone]    If your question concerns the schedule/appointment times, contact:  [facility]   [phone]    Device Clinic (Pacemakers, Defibrillators, Loop Recorders)  [phone]          Preparing the Skin Before Surgery  Preparing or \"prepping\" skin before surgery can reduce the risk of infection at the surgical site. To make the process easier, this facility has chosen disposable cloths moistened with rinse-free 2% Chlorhexidine Gluconate antiseptic solution designed to reduce the bacteria on the skin. The steps below outline the prepping process and should be carefully followed.    Prep the skin at the following time(s):    - If you wish to shower, bathe or shampoo your hair, do so the night before and prep your skin afterwards for the first time using one package of cloths.    - Skin must be prepped the morning of the procedure using the second package of cloths.        Prep The Night Before " Procedure  Do not allow this product to come in contact with your eyes, ears, mouth or mucous membranes.   Reach into one of the packages, remove the two cloths and place onto a clean table.  Use one clean cloth to prep each are of the body. One cloth for #1 - neck & chest. One cloth for #2 & #3 - both arms, shoulder to fingertips including armpits. Wipe each area in a back and forth motion for 3 minutes. Be sure to wipe each area thoroughly.  Do not rinse or apply any lotions, moisturizer or make up after prepping.  Discard cloths in trash can.   Allow your skin to air dry. Dress in clean clothes/sleepwear      Prepping your skin on the morning of surgery:  Do not shower, bathe or shampoo hair.  Open a new package and follow the instructions listed above.

## 2023-12-04 ENCOUNTER — TELEPHONE (OUTPATIENT)
Dept: LAB | Facility: CLINIC | Age: 43
End: 2023-12-04
Payer: COMMERCIAL

## 2023-12-04 NOTE — TELEPHONE ENCOUNTER
EP Scheduling called the patient to schedule ILR Explant.  The number [phone] was left for the patient to return the call and schedule the procedure.    Arely Goddard  Periop Electrophysiology   [phone]

## 2023-12-12 NOTE — TELEPHONE ENCOUNTER
Another message left and a letter mailed to the patient's home.     Arely Goddard  Periop Electrophysiology   [phone]

## 2023-12-27 ENCOUNTER — APPOINTMENT (OUTPATIENT)
Dept: MEDSURG UNIT | Facility: CLINIC | Age: 43
End: 2023-12-27
Attending: INTERNAL MEDICINE
Payer: COMMERCIAL

## 2023-12-27 ENCOUNTER — HOSPITAL ENCOUNTER (OUTPATIENT)
Facility: CLINIC | Age: 43
Discharge: HOME | End: 2023-12-27
Attending: INTERNAL MEDICINE | Admitting: INTERNAL MEDICINE
Payer: COMMERCIAL

## 2023-12-27 VITALS
TEMPERATURE: 98.3 F | RESPIRATION RATE: 16 BRPM | HEART RATE: 74 BPM | DIASTOLIC BLOOD PRESSURE: 67 MMHG | OXYGEN SATURATION: 100 % | SYSTOLIC BLOOD PRESSURE: 112 MMHG

## 2023-12-27 DIAGNOSIS — I42.8 ARRHYTHMOGENIC LEFT VENTRICULAR DYSPLASIA (H): ICD-10-CM

## 2023-12-27 DIAGNOSIS — I42.8 ARRHYTHMOGENIC RIGHT VENTRICULAR CARDIOMYOPATHY (H): ICD-10-CM

## 2023-12-27 PROCEDURE — 33286 RMVL SUBQ CAR RHYTHM MNTR: CPT | Performed by: INTERNAL MEDICINE

## 2023-12-27 PROCEDURE — 93010 ELECTROCARDIOGRAM REPORT: CPT | Mod: XU | Performed by: INTERNAL MEDICINE

## 2023-12-27 PROCEDURE — 999N000132 HC STATISTIC PP CARE STAGE 1

## 2023-12-27 PROCEDURE — 999N000054 HC STATISTIC EKG NON-CHARGEABLE

## 2023-12-27 PROCEDURE — 93005 ELECTROCARDIOGRAM TRACING: CPT

## 2023-12-27 PROCEDURE — 999N000142 HC STATISTIC PROCEDURE PREP ONLY

## 2023-12-27 PROCEDURE — 250N000009 HC RX 250: Performed by: INTERNAL MEDICINE

## 2023-12-27 RX ORDER — NALOXONE HYDROCHLORIDE 0.4 MG/ML
0.4 INJECTION, SOLUTION INTRAMUSCULAR; INTRAVENOUS; SUBCUTANEOUS
Status: CANCELLED | OUTPATIENT
Start: 2023-12-27

## 2023-12-27 RX ORDER — NALOXONE HYDROCHLORIDE 0.4 MG/ML
0.2 INJECTION, SOLUTION INTRAMUSCULAR; INTRAVENOUS; SUBCUTANEOUS
Status: CANCELLED | OUTPATIENT
Start: 2023-12-27

## 2023-12-27 RX ORDER — LIDOCAINE 40 MG/G
CREAM TOPICAL
Status: DISCONTINUED | OUTPATIENT
Start: 2023-12-27 | End: 2023-12-27 | Stop reason: HOSPADM

## 2023-12-27 RX ORDER — LIDOCAINE HYDROCHLORIDE AND EPINEPHRINE 10; 10 MG/ML; UG/ML
INJECTION, SOLUTION INFILTRATION; PERINEURAL
Status: DISCONTINUED | OUTPATIENT
Start: 2023-12-27 | End: 2023-12-27 | Stop reason: HOSPADM

## 2023-12-27 RX ORDER — SODIUM CHLORIDE 9 MG/ML
INJECTION, SOLUTION INTRAVENOUS CONTINUOUS
Status: DISCONTINUED | OUTPATIENT
Start: 2023-12-27 | End: 2023-12-27 | Stop reason: HOSPADM

## 2023-12-27 ASSESSMENT — ACTIVITIES OF DAILY LIVING (ADL)
ADLS_ACUITY_SCORE: 35
ADLS_ACUITY_SCORE: 35

## 2023-12-27 NOTE — H&P
H&P from office visit on 12/1/23 with Dr Walter reviewed. Following today's exam there are no interval changes.     Adilene Velasco PA-C  [facility]  Electrophysiology Consult Service  Pager: [phone]

## 2023-12-27 NOTE — DISCHARGE INSTRUCTIONS
Home Care after a Loop Recorder Explant  Wound care:  Check for signs of infection each day.  These include increased redness, swelling, drainage or a fever over 101 F (38.3 C).  Call us immediately if you see any of these signs.  You may shower 24 hours after the procedure.  Do not submerge the incision (in a bath tub, hot tub, or swimming pool) until fully healed.  Do not apply powder or lotion to the incision for 14 days.     Pain:   You may have mild pain for 3 to 5 days.  Take acetaminophen (Tylenol) or ibuprofen (Advil) for the pain.  Call us if the pain is severe or lasts more than 5 days.    Activity:  After 24 hours, slowly return to your normal activities.    Avoid anything that may cause rough contact or a hard hit to your chest.  This includes football, hockey, and other contact sports.      Follow-Up Visits:  Per provider    Questions?  Please call [facility].   Device Nurse:          Business Hours:  [phone]                         After Hours:         [phone]   Choose option 4, then ask for the on-call device nurse at job code [id number].          [facility]  Clinics and Surgery Center - Clinic [address]  [address] [place] [address]  [id number]

## 2023-12-27 NOTE — PROGRESS NOTES
Prepped on 2A for loop recorder removal. Heriberto wipes and shower done at home. /73   Pulse 69   Temp 98.3  F (36.8  C) (Oral)   Resp 16   SpO2 99%  She is alert and oriented x 4 and able to make needs known. She drove herself here and will drive self home as no sedation is anticipated for procedure. EKG done. Consent signed. No labs needed. No PIV/fluids/antibiotics needed.

## 2023-12-27 NOTE — PROGRESS NOTES
Arrived to 2A, room 9, via litter from the cardiac cath lab s/p loop recorder explant. Left upper chest incision is covered with a small primapore that is CDI. She is eating and drinking. She is alert and oriented x 4 and able to make needs known. She denies pain.

## 2023-12-28 LAB
ATRIAL RATE - MUSE: 65 BPM
DIASTOLIC BLOOD PRESSURE - MUSE: NORMAL MMHG
INTERPRETATION ECG - MUSE: NORMAL
P AXIS - MUSE: 37 DEGREES
PR INTERVAL - MUSE: 154 MS
QRS DURATION - MUSE: 78 MS
QT - MUSE: 386 MS
QTC - MUSE: 401 MS
R AXIS - MUSE: 2 DEGREES
SYSTOLIC BLOOD PRESSURE - MUSE: NORMAL MMHG
T AXIS - MUSE: 13 DEGREES
VENTRICULAR RATE- MUSE: 65 BPM

## 2024-01-23 ENCOUNTER — TRANSFERRED RECORDS (OUTPATIENT)
Dept: HEALTH INFORMATION MANAGEMENT | Facility: CLINIC | Age: 44
End: 2024-01-23
Payer: COMMERCIAL

## 2024-03-21 ENCOUNTER — OFFICE VISIT (OUTPATIENT)
Dept: FAMILY MEDICINE | Facility: CLINIC | Age: 44
End: 2024-03-21
Payer: COMMERCIAL

## 2024-03-21 VITALS
OXYGEN SATURATION: 97 % | DIASTOLIC BLOOD PRESSURE: 70 MMHG | HEIGHT: 65 IN | HEART RATE: 74 BPM | WEIGHT: 162 LBS | SYSTOLIC BLOOD PRESSURE: 101 MMHG | BODY MASS INDEX: 26.99 KG/M2 | TEMPERATURE: 97.3 F

## 2024-03-21 DIAGNOSIS — E03.9 ACQUIRED HYPOTHYROIDISM: ICD-10-CM

## 2024-03-21 DIAGNOSIS — B96.89 BV (BACTERIAL VAGINOSIS): ICD-10-CM

## 2024-03-21 DIAGNOSIS — E78.00 PURE HYPERCHOLESTEROLEMIA: ICD-10-CM

## 2024-03-21 DIAGNOSIS — Z23 HIGH PRIORITY FOR 2019-NCOV VACCINE: ICD-10-CM

## 2024-03-21 DIAGNOSIS — Z00.00 ROUTINE GENERAL MEDICAL EXAMINATION AT A HEALTH CARE FACILITY: Primary | ICD-10-CM

## 2024-03-21 DIAGNOSIS — N76.0 BV (BACTERIAL VAGINOSIS): ICD-10-CM

## 2024-03-21 PROBLEM — K63.5 POLYP OF COLON: Status: ACTIVE | Noted: 2023-11-10

## 2024-03-21 LAB
BACTERIAL VAGINOSIS VAG-IMP: ABNORMAL
CANDIDA DNA VAG QL NAA+PROBE: ABNORMAL
CANDIDA GLABRATA / CANDIDA KRUSEI DNA: ABNORMAL
T VAGINALIS DNA VAG QL NAA+PROBE: ABNORMAL

## 2024-03-21 PROCEDURE — 84443 ASSAY THYROID STIM HORMONE: CPT | Performed by: FAMILY MEDICINE

## 2024-03-21 PROCEDURE — 80061 LIPID PANEL: CPT | Performed by: FAMILY MEDICINE

## 2024-03-21 PROCEDURE — 84460 ALANINE AMINO (ALT) (SGPT): CPT | Performed by: FAMILY MEDICINE

## 2024-03-21 PROCEDURE — 0352U MULTIPLEX VAGINAL PANEL BY PCR: CPT | Performed by: FAMILY MEDICINE

## 2024-03-21 RX ORDER — METRONIDAZOLE 7.5 MG/G
GEL VAGINAL DAILY
COMMUNITY

## 2024-03-21 RX ORDER — ATORVASTATIN CALCIUM 20 MG/1
20 TABLET, FILM COATED ORAL DAILY
Qty: 90 TABLET | Refills: 4 | Status: SHIPPED | OUTPATIENT
Start: 2024-03-21

## 2024-03-21 RX ORDER — LEVOTHYROXINE SODIUM 200 UG/1
200 TABLET ORAL DAILY
Qty: 90 TABLET | Refills: 3 | Status: SHIPPED | OUTPATIENT
Start: 2024-03-21 | End: 2024-03-24

## 2024-03-21 RX ORDER — CALCIUM POLYCARBOPHIL 625 MG
TABLET ORAL DAILY
COMMUNITY

## 2024-03-21 SDOH — HEALTH STABILITY: PHYSICAL HEALTH: ON AVERAGE, HOW MANY MINUTES DO YOU ENGAGE IN EXERCISE AT THIS LEVEL?: 60 MIN

## 2024-03-21 SDOH — HEALTH STABILITY: PHYSICAL HEALTH: ON AVERAGE, HOW MANY DAYS PER WEEK DO YOU ENGAGE IN MODERATE TO STRENUOUS EXERCISE (LIKE A BRISK WALK)?: 3 DAYS

## 2024-03-21 ASSESSMENT — SOCIAL DETERMINANTS OF HEALTH (SDOH): HOW OFTEN DO YOU GET TOGETHER WITH FRIENDS OR RELATIVES?: TWICE A WEEK

## 2024-03-21 NOTE — PATIENT INSTRUCTIONS
Preventive Care Advice   This is general advice given by our system to help you stay healthy. However, your care team may have specific advice just for you. Please talk to your care team about your preventive care needs.  Nutrition  Eat 5 or more servings of fruits and vegetables each day.  Try wheat bread, brown rice and whole grain pasta (instead of white bread, rice, and pasta).  Get enough calcium and vitamin D. Check the label on foods and aim for 100% of the RDA (recommended daily allowance).  Lifestyle  Exercise at least 150 minutes each week   (30 minutes a day, 5 days a week).  Do muscle strengthening activities 2 days a week. These help control your weight and prevent disease.  No smoking.  Wear sunscreen to prevent skin cancer.  Have a dental exam and cleaning every 6 months.  Yearly exams  See your health care team every year to talk about:  Any changes in your health.  Any medicines your care team has prescribed.  Preventive care, family planning, and ways to prevent chronic diseases.  Shots (vaccines)   HPV shots (up to age 26), if you've never had them before.  Hepatitis B shots (up to age 59), if you've never had them before.  COVID-19 shot: Get this shot when it's due.  Flu shot: Get a flu shot every year.  Tetanus shot: Get a tetanus shot every 10 years.  Pneumococcal, hepatitis A, and RSV shots: Ask your care team if you need these based on your risk.  Shingles shot (for age 50 and up).  General health tests  Diabetes screening:  Starting at age 35, Get screened for diabetes at least every 3 years.  If you are younger than age 35, ask your care team if you should be screened for diabetes.  Cholesterol test: At age 39, start having a cholesterol test every 5 years, or more often if advised.  Bone density scan (DEXA): At age 50, ask your care team if you should have this scan for osteoporosis (brittle bones).  Hepatitis C: Get tested at least once in your life.  STIs (sexually transmitted  infections)  Before age 24: Ask your care team if you should be screened for STIs.  After age 24: Get screened for STIs if you're at risk. You are at risk for STIs (including HIV) if:  You are sexually active with more than one person.  You don't use condoms every time.  You or a partner was diagnosed with a sexually transmitted infection.  If you are at risk for HIV, ask about PrEP medicine to prevent HIV.  Get tested for HIV at least once in your life, whether you are at risk for HIV or not.  Cancer screening tests  Cervical cancer screening: If you have a cervix, begin getting regular cervical cancer screening tests at age 21. Most people who have regular screenings with normal results can stop after age 65. Talk about this with your provider.  Breast cancer scan (mammogram): If you've ever had breasts, begin having regular mammograms starting at age 40. This is a scan to check for breast cancer.  Colon cancer screening: It is important to start screening for colon cancer at age 45.  Have a colonoscopy test every 10 years (or more often if you're at risk) Or, ask your provider about stool tests like a FIT test every year or Cologuard test every 3 years.  To learn more about your testing options, visit: https://[email or web address]/722520.pdf.  For help making a decision, visit: https://bit.ly/np17255.  Prostate cancer screening test: If you have a prostate and are age 55 to 69, ask your provider if you would benefit from a yearly prostate cancer screening test.  Lung cancer screening: If you are a current or former smoker age 50 to 80, ask your care team if ongoing lung cancer screenings are right for you.  For informational purposes only. Not to replace the advice of your health care provider. Copyright   2023 [employer]. All rights reserved. Clinically reviewed by the [facility]. [employer] 681651 - REV 01/24.    Learning About Stress  What is stress?     Stress is your  body's response to a hard situation. Your body can have a physical, emotional, or mental response. Stress is a fact of life for most people, and it affects everyone differently. What causes stress for you may not be stressful for someone else.  A lot of things can cause stress. You may feel stress when you go on a job interview, take a test, or run a race. This kind of short-term stress is normal and even useful. It can help you if you need to work hard or react quickly. For example, stress can help you finish an important job on time.  Long-term stress is caused by ongoing stressful situations or events. Examples of long-term stress include long-term health problems, ongoing problems at work, or conflicts in your family. Long-term stress can harm your health.  How does stress affect your health?  When you are stressed, your body responds as though you are in danger. It makes hormones that speed up your heart, make you breathe faster, and give you a burst of energy. This is called the fight-or-flight stress response. If the stress is over quickly, your body goes back to normal and no harm is done.  But if stress happens too often or lasts too long, it can have bad effects. Long-term stress can make you more likely to get sick, and it can make symptoms of some diseases worse. If you tense up when you are stressed, you may develop neck, shoulder, or low back pain. Stress is linked to high blood pressure and heart disease.  Stress also harms your emotional health. It can make you haider, tense, or depressed. Your relationships may suffer, and you may not do well at work or school.  What can you do to manage stress?  You can try these things to help manage stress:   Do something active. Exercise or activity can help reduce stress. Walking is a great way to get started. Even everyday activities such as housecleaning or yard work can help.  Try yoga or abby chi. These techniques combine exercise and meditation. You may need  some training at first to learn them.  Do something you enjoy. For example, listen to music or go to a movie. Practice your hobby or do volunteer work.  Meditate. This can help you relax, because you are not worrying about what happened before or what may happen in the future.  Do guided imagery. Imagine yourself in any setting that helps you feel calm. You can use online videos, books, or a teacher to guide you.  Do breathing exercises. For example:  From a standing position, bend forward from the waist with your knees slightly bent. Let your arms dangle close to the floor.  Breathe in slowly and deeply as you return to a standing position. Roll up slowly and lift your head last.  Hold your breath for just a few seconds in the standing position.  Breathe out slowly and bend forward from the waist.  Let your feelings out. Talk, laugh, cry, and express anger when you need to. Talking with supportive friends or family, a counselor, or a tony leader about your feelings is a healthy way to relieve stress. Avoid discussing your feelings with people who make you feel worse.  Write. It may help to write about things that are bothering you. This helps you find out how much stress you feel and what is causing it. When you know this, you can find better ways to cope.  What can you do to prevent stress?  You might try some of these things to help prevent stress:  Manage your time. This helps you find time to do the things you want and need to do.  Get enough sleep. Your body recovers from the stresses of the day while you are sleeping.  Get support. Your family, friends, and community can make a difference in how you experience stress.  Limit your news feed. Avoid or limit time on social media or news that may make you feel stressed.  Do something active. Exercise or activity can help reduce stress. Walking is a great way to get started.  Where can you learn more?  Go to https://www.healthwise.net/patiented  Enter N032 in the  "search box to learn more about \"Learning About Stress.\"  Current as of: October 24, 2023               Content Version: 14.0    9628-7399 [employer].   Care instructions adapted under license by your healthcare professional. If you have questions about a medical condition or this instruction, always ask your healthcare professional. [employer] disclaims any warranty or liability for your use of this information.      "

## 2024-03-21 NOTE — NURSING NOTE
"Maren  43 year old    Chief Complaint   Patient presents with    Physical     Discuss perimenopause    Pharyngitis     Sore throat and nausea started yesterday    Recheck Medication     Levothyroxine - discuss dosage   Metronidazole gel for BV - discuss plan going forward            Blood pressure 101/70, pulse 74, temperature 97.3  F (36.3  C), temperature source Skin, height 1.648 m (5' 4.88\"), weight 73.5 kg (162 lb), last menstrual period 2024, SpO2 97%, not currently breastfeeding. Body mass index is 27.06 kg/m .    Patient Active Problem List   Diagnosis    Cardiomyopathy, familial (H)    Keratosis pilaris    Arrhythmogenic left ventricular dysplasia (H)    Hypothyroidism    Hyperlipidemia LDL goal <130    Arrhythmogenic right ventricular cardiomyopathy (H)    Polyp of colon              Wt Readings from Last 2 Encounters:   24 73.5 kg (162 lb)   23 70.3 kg (155 lb)       BP Readings from Last 3 Encounters:   24 101/70   23 112/67   23 119/73                Current Outpatient Medications   Medication    atorvastatin (LIPITOR) 20 MG tablet    calcium citrate-vitamin D (CITRACAL) 200-6.25 MG-MCG TABS per tablet    Calcium Polycarbophil (FIBER) 625 MG tablet    levothyroxine (SYNTHROID/LEVOTHROID) 200 MCG tablet    metoprolol succinate ER (TOPROL XL) 100 MG 24 hr tablet    metroNIDAZOLE (METROGEL) 0.75 % vaginal gel    Cholecalciferol (VITAMIN D3) 1000 units CAPS    levothyroxine (SYNTHROID/LEVOTHROID) 25 MCG tablet     No current facility-administered medications for this visit.              Social History     Tobacco Use    Smoking status: Former     Years: 2     Types: Cigarettes     Quit date: 4/10/2001     Years since quittin.9    Smokeless tobacco: Never   Vaping Use    Vaping Use: Never used   Substance Use Topics    Alcohol use: Yes     Alcohol/week: 1.0 - 3.0 standard drink of alcohol     Types: 1 - 3 Standard drinks or equivalent per week    Drug use: No        "       Health Maintenance Due   Topic Date Due    ADVANCE CARE PLANNING  Never done    HEPATITIS B IMMUNIZATION (1 of 3 - 19+ 3-dose series) Never done    INFLUENZA VACCINE (1) 09/01/2023    COVID-19 Vaccine (5 - 2023-24 season) 09/01/2023    ALT  03/01/2024    LIPID  03/01/2024    YEARLY PREVENTIVE VISIT  03/01/2024    TSH W/FREE T4 REFLEX  03/01/2024              Lab Results   Component Value Date    PAP ASC-US 04/02/2018 March 21, 2024 2:14 PM

## 2024-03-21 NOTE — PROGRESS NOTES
Preventive Care Visit  [facility]  Marcy Anthony MD, Family Medicine  Mar 21, 2024      Assessment & Plan     Routine general medical examination at a health care facility  Covid-19 vaccine updated today. Sore throat seems c/w viral pharyngitis. Symptomatic treatment discussed. Worrisome signs and symptoms were discussed with Maren and she was instructed to return to the clinic for concerning symptoms or to call with questions.    Acquired hypothyroidism  Chronic. Did increase levothyroxine to 225 mcg daily last year after TSH was elevated, but did not return for lab recheck and ran out of 25 mcg tablet so has just been on 200 mcg daily since then. She is not taking levothyroxine on a completely empty stomach (taking with her other morning meds/supplements). Discussed how to take levothyroxine for optimal absorption. Check TSH today.   - levothyroxine (SYNTHROID/LEVOTHROID) 200 MCG tablet; Take 1 tablet (200 mcg) by mouth daily Take in addition to 25 mcg tablet for a total of 225 mcg daily.  - TSH with free T4 reflex    Pure hypercholesterolemia  Chronic. Continue atorvastatin 20 mg daily. Check lipid profile and ALT today.   - Lipid Profile  - ALT    BV (bacterial vaginosis)  Recurrent BV. Currently taking metrogel 2x/weekly with vaginal boric acid suppositories with her period and after intercourse but still feels that she has symptoms of BV. Will check vaginal PCR today.   - Multiplex Vaginal Panel by PCR      Counseling  Appropriate preventive services were discussed with this patient, including applicable screening as appropriate for fall prevention, nutrition, physical activity, Tobacco-use cessation, weight loss and cognition.  Checklist reviewing preventive services available has been given to the patient.  Reviewed patient's diet, addressing concerns and/or questions.   She is at risk for lack of exercise and has been provided with information to increase physical activity for the benefit of her  well-being.   She is at risk for psychosocial distress and has been provided with information to reduce risk.       Return in about 53 weeks (around 3/27/2025) for Annual Wellness Visit.    Sydnie Engel is a 43 year old, presenting for the following:  Physical (Discuss perimenopause), Pharyngitis (Sore throat and nausea started yesterday), Recheck Medication (Levothyroxine - discuss dosage /Metronidazole gel for BV - discuss plan going forward), and Imm/Inj (COVID-19 VACCINE)         HPI  Sore throat - Mild sore throat. Associated with some nausea. Symptoms began yesterday afternoon. Symptoms are accompanied by mild headache. Mild bilateral ear pressure. No fevers/chills. Nausea comes in waves. No vomiting. Wondering about the nasal congestion contributing to nausea.no abdominal pain. Normal bowel movements. Drinking tea, Tylenol, and ibuprofen have helped. She has travel plans next week. No recent sick contacts.     Recurrent BV - Tested positive on 10/20/2022 and 8/7/2023. Currently using metronidazole gel 2x/weekly - Sunday. Symptoms will come and go. The day after intercourse or after her period she will have symptoms. Also added in a probiotic. Also using vaginal boric at the end of her period and after she has sex.     Hypothyroidism - Since college. Stable on levothyroxine 200 mcg daily. No s/sx of hypo or hyperthyroidism. Last TSH was elevated at 15.20 -- dose of levothyroxine increased to 225 mcg daily but Maren did not return for recheck of labs and ran out of refill. She is taking her levothyroxine with her other morning medications (not spacing out from other meds).      HLD - Stable on atorvastatin 20 mg daily.      Familial cardiomyopathy - Loop recorder implant was taken out due to no irregularities. She continues on metoprolol 100 mg daily. Sees cardiology annually. No symptoms.         3/21/2024   General Health   How would you rate your overall physical health? Good   Feel stress (tense,  anxious, or unable to sleep) Only a little   (!) STRESS CONCERN      3/21/2024   Nutrition   Three or more servings of calcium each day? Yes   Diet: Regular (no restrictions)   How many servings of fruit and vegetables per day? (!) 2-3   How many sweetened beverages each day? 0-1         3/21/2024   Exercise   Days per week of moderate/strenous exercise 3 days   Average minutes spent exercising at this level 60 min         3/21/2024   Social Factors   Frequency of gathering with friends or relatives Twice a week   Worry food won't last until get money to buy more No   Food not last or not have enough money for food? No   Do you have housing?  Yes   Are you worried about losing your housing? No   Lack of transportation? No   Unable to get utilities (heat,electricity)? No         3/21/2024   Dental   Dentist two times every year? Yes         3/21/2024   TB Screening   Were you born outside of the [place]? No         Today's PHQ-2 Score:       3/21/2024     1:02 PM   PHQ-2 (  [employer])   Q1: Little interest or pleasure in doing things 0   Q2: Feeling down, depressed or hopeless 0   PHQ-2 Score 0   Q1: Little interest or pleasure in doing things Not at all   Q2: Feeling down, depressed or hopeless Not at all   PHQ-2 Score 0           3/21/2024   Substance Use   Alcohol more than 3/day or more than 7/wk No   Do you use any other substances recreationally? No     Social History     Tobacco Use    Smoking status: Former     Years: 2     Types: Cigarettes     Quit date: 4/10/2001     Years since quittin.9    Smokeless tobacco: Never   Vaping Use    Vaping Use: Never used   Substance Use Topics    Alcohol use: Yes     Alcohol/week: 1.0 - 3.0 standard drink of alcohol     Types: 1 - 3 Standard drinks or equivalent per week    Drug use: No         2023   LAST FHS-7 RESULTS   1st degree relative breast or ovarian cancer No   Any relative bilateral breast cancer No   Any male have breast cancer No   Any ONE woman have  "BOTH breast AND ovarian cancer No   Any woman with breast cancer before 50yrs No   2 or more relatives with breast AND/OR ovarian cancer No   2 or more relatives with breast AND/OR bowel cancer No     Mammogram Screening - Mammogram every 1-2 years updated in Health Maintenance based on mutual decision making        3/21/2024   STI Screening   New sexual partner(s) since last STI/HIV test? No     History of abnormal Pap smear: Yes 2018 ASCUS with negative HPV        Latest Ref Rng & Units 10/5/2021     9:25 AM 4/2/2018     9:45 AM 4/2/2018     9:21 AM   PAP / HPV   PAP  Negative for Intraepithelial Lesion or Malignancy (NILM)      PAP (Historical)    ASC-US    HPV 16 DNA Negative Negative  Negative     HPV 18 DNA Negative Negative  Negative     Other HR HPV Negative Negative  Negative       ASCVD Risk   The 10-year ASCVD risk score (Zara MORAN, et al., 2019) is: 0.4%    Values used to calculate the score:      Age: 43 years      Sex: Female      Is Non- : No      Diabetic: No      Tobacco smoker: No      Systolic Blood Pressure: 101 mmHg      Is BP treated: No      HDL Cholesterol: 76 mg/dL      Total Cholesterol: 240 mg/dL        3/21/2024   Contraception/Family Planning   Questions about contraception or family planning No     Reviewed and updated as needed this visit by Provider     Meds  Problems  Med Hx  Surg Hx  Fam Hx               Objective    Exam  /70 (BP Location: Left arm, Patient Position: Sitting, Cuff Size: Adult Regular)   Pulse 74   Temp 97.3  F (36.3  C) (Skin)   Ht 1.648 m (5' 4.88\")   Wt 73.5 kg (162 lb)   LMP 03/16/2024 (Exact Date)   SpO2 97%   BMI 27.06 kg/m     Estimated body mass index is 27.06 kg/m  as calculated from the following:    Height as of this encounter: 1.648 m (5' 4.88\").    Weight as of this encounter: 73.5 kg (162 lb).    Physical Exam  GENERAL: alert and no distress  EYES: Eyes grossly normal to inspection, PERRL and " conjunctivae and sclerae normal  HENT: ear canals and TM's normal, nose and mouth without ulcers or lesions  NECK: no adenopathy, no asymmetry, masses, or scars  RESP: lungs clear to auscultation - no rales, rhonchi or wheezes  BREAST: normal without masses, tenderness or nipple discharge and no palpable axillary masses or adenopathy  CV: regular rate and rhythm, normal S1 S2, no S3 or S4, no murmur, click or rub, no peripheral edema  ABDOMEN: soft, nontender, without hepatosplenomegaly or masses  MS: no gross musculoskeletal defects noted, no edema  SKIN: no suspicious lesions or rashes  NEURO: Normal strength and tone, mentation intact and speech normal  PSYCH: mentation appears normal, affect normal/bright        Signed Electronically by: Marcy Anthony MD

## 2024-03-22 DIAGNOSIS — B96.89 BACTERIAL VAGINOSIS: Primary | ICD-10-CM

## 2024-03-22 DIAGNOSIS — N76.0 BACTERIAL VAGINOSIS: Primary | ICD-10-CM

## 2024-03-22 LAB
ALT SERPL W P-5'-P-CCNC: 15 U/L (ref 0–50)
CHOLEST SERPL-MCNC: 152 MG/DL
FASTING STATUS PATIENT QL REPORTED: NO
HDLC SERPL-MCNC: 59 MG/DL
LDLC SERPL CALC-MCNC: 79 MG/DL
NONHDLC SERPL-MCNC: 93 MG/DL
T4 FREE SERPL-MCNC: 1.89 NG/DL (ref 0.9–1.7)
TRIGL SERPL-MCNC: 68 MG/DL
TSH SERPL DL<=0.005 MIU/L-ACNC: 0.05 UIU/ML (ref 0.3–4.2)

## 2024-03-22 RX ORDER — METRONIDAZOLE 500 MG/1
500 TABLET ORAL 2 TIMES DAILY
Qty: 14 TABLET | Refills: 0 | Status: SHIPPED | OUTPATIENT
Start: 2024-03-22 | End: 2024-03-29

## 2024-03-24 DIAGNOSIS — E03.9 ACQUIRED HYPOTHYROIDISM: ICD-10-CM

## 2024-03-24 RX ORDER — LEVOTHYROXINE SODIUM 175 UG/1
175 TABLET ORAL DAILY
Qty: 90 TABLET | Refills: 0 | Status: SHIPPED | OUTPATIENT
Start: 2024-03-24

## 2024-03-24 NOTE — PROGRESS NOTES
Dose adjustment - reduce levothyroxine from 200 mcg daily to 175 mcg daily. Recheck TSH in 4-6 weeks.

## 2024-03-25 ENCOUNTER — OFFICE VISIT (OUTPATIENT)
Dept: FAMILY MEDICINE | Facility: CLINIC | Age: 44
End: 2024-03-25
Payer: COMMERCIAL

## 2024-03-25 VITALS
RESPIRATION RATE: 17 BRPM | DIASTOLIC BLOOD PRESSURE: 76 MMHG | OXYGEN SATURATION: 97 % | HEART RATE: 84 BPM | TEMPERATURE: 97.6 F | SYSTOLIC BLOOD PRESSURE: 114 MMHG

## 2024-03-25 DIAGNOSIS — R68.89 FLU-LIKE SYMPTOMS: Primary | ICD-10-CM

## 2024-03-25 DIAGNOSIS — J04.0 LARYNGITIS: ICD-10-CM

## 2024-03-25 DIAGNOSIS — H66.90 ACUTE OTITIS MEDIA, UNSPECIFIED OTITIS MEDIA TYPE: ICD-10-CM

## 2024-03-25 LAB
FLUAV AG SPEC QL IA: NEGATIVE
FLUAV RNA SPEC QL NAA+PROBE: NEGATIVE
FLUBV AG SPEC QL IA: NEGATIVE
FLUBV RNA RESP QL NAA+PROBE: NEGATIVE
RSV RNA SPEC NAA+PROBE: NEGATIVE
SARS-COV-2 RNA RESP QL NAA+PROBE: NEGATIVE

## 2024-03-25 PROCEDURE — 87637 SARSCOV2&INF A&B&RSV AMP PRB: CPT | Performed by: FAMILY MEDICINE

## 2024-03-25 NOTE — NURSING NOTE
43 year old  Chief Complaint   Patient presents with    Ear Problem     Cold that has gotten worse -- here on 3/22 for physical, 3/23 sore throat, nausea, HA. Didn't look like strep on exam. Pain in right ear since 3/23, sore throat, laryngitis. Runny nose/congestion. Eating drinking ok, no long having nausea, eating simple foods (chicken soups) no fevers noted, cold/hot swings. Slight cough.       Blood pressure 114/76, pulse 84, temperature 97.6  F (36.4  C), temperature source Temporal, resp. rate 17, last menstrual period 2024, SpO2 97%, not currently breastfeeding. There is no height or weight on file to calculate BMI.  Patient Active Problem List   Diagnosis    Cardiomyopathy, familial (H)    Keratosis pilaris    Arrhythmogenic left ventricular dysplasia (H)    Hypothyroidism    Hyperlipidemia LDL goal <130    Arrhythmogenic right ventricular cardiomyopathy (H)    Polyp of colon       Wt Readings from Last 2 Encounters:   24 73.5 kg (162 lb)   23 70.3 kg (155 lb)     BP Readings from Last 3 Encounters:   24 114/76   24 101/70   23 112/67         Current Outpatient Medications   Medication    atorvastatin (LIPITOR) 20 MG tablet    Boric Acid Vaginal 600 MG SUPP    calcium citrate-vitamin D (CITRACAL) 200-6.25 MG-MCG TABS per tablet    Calcium Polycarbophil (FIBER) 625 MG tablet    levothyroxine (SYNTHROID/LEVOTHROID) 175 MCG tablet    methylcellulose (CITRUCEL) 500 MG TABS tablet    metoprolol succinate ER (TOPROL XL) 100 MG 24 hr tablet    metroNIDAZOLE (FLAGYL) 500 MG tablet    metroNIDAZOLE (METROGEL) 0.75 % vaginal gel     No current facility-administered medications for this visit.       Social History     Tobacco Use    Smoking status: Former     Years: 2     Types: Cigarettes     Quit date: 4/10/2001     Years since quittin.9    Smokeless tobacco: Never   Vaping Use    Vaping Use: Never used   Substance Use Topics    Alcohol use: Yes     Alcohol/week: 1.0 - 3.0  standard drink of alcohol     Types: 1 - 3 Standard drinks or equivalent per week    Drug use: No       Health Maintenance Due   Topic Date Due    ADVANCE CARE PLANNING  Never done    HEPATITIS B IMMUNIZATION (1 of 3 - 19+ 3-dose series) Never done    INFLUENZA VACCINE (1) 09/01/2023       Lab Results   Component Value Date    PAP ASC-US 04/02/2018 March 25, 2024 9:31 AM

## 2024-03-25 NOTE — PROGRESS NOTES
ASSESSMENT and PLAN:     URI symptoms with RIGHT Otitis Media. Also, negative rapid Flu test    Check for Covid, RSV and flu by tri-screen  Also, treat for RIGHT Otitis Media with Augmentin 875 BID for 7 days  Plenty of fluids. Analgesics as needed and directed  Travel precautions discussed    Follow-up as needed    Zoran Hennessy MD  Family Medicine and Sports Medicine  [facility]      Medical assistant intake:  Maren Bee is a 43 year old female who presents to [facility] today for Ear Problem (Cold that has gotten worse -- here on 3/22 for physical, 3/23 sore throat, nausea, HA. Didn't look like strep on exam. Pain in right ear since 3/23, sore throat, laryngitis. Runny nose/congestion. Eating drinking ok, no long having nausea, eating simple foods (chicken soups) no fevers noted, cold/hot swings. Slight cough.)      SUBJECTIVE:   Maren is a normally healthy 42 yo female. Has 2 teenage children who are both well.  also well.   Has above symptoms. Main issue is RIGHT ear pain.   All complicated by an upcoming flight. Fortunately, feels better today than yesterday.       Review Of Systems:  See subjective.       Problem list per EMR:  Patient Active Problem List   Diagnosis    Cardiomyopathy, familial (H)    Keratosis pilaris    Arrhythmogenic left ventricular dysplasia (H)    Hypothyroidism    Hyperlipidemia LDL goal <130    Arrhythmogenic right ventricular cardiomyopathy (H)    Polyp of colon       Current Outpatient Medications   Medication Sig Dispense Refill    atorvastatin (LIPITOR) 20 MG tablet Take 1 tablet (20 mg) by mouth daily 90 tablet 4    Boric Acid Vaginal 600 MG SUPP Place 600 mg vaginally as needed (BV)      calcium citrate-vitamin D (CITRACAL) 200-6.25 MG-MCG TABS per tablet Take 1 tablet by mouth 2 times daily      Calcium Polycarbophil (FIBER) 625 MG tablet Take by mouth daily      levothyroxine (SYNTHROID/LEVOTHROID) 175 MCG tablet Take 1 tablet (175 mcg) by  mouth daily 90 tablet 0    methylcellulose (CITRUCEL) 500 MG TABS tablet Take 500 mg by mouth daily      metoprolol succinate ER (TOPROL XL) 100 MG 24 hr tablet Take 1 tablet (100 mg) by mouth daily 90 tablet 0    metroNIDAZOLE (FLAGYL) 500 MG tablet Take 1 tablet (500 mg) by mouth 2 times daily for 7 days 14 tablet 0    metroNIDAZOLE (METROGEL) 0.75 % vaginal gel Place vaginally daily         Allergies   Allergen Reactions    Adhesive Tape Rash        OBJECTIVE    Vitals: /76 (BP Location: Left arm, Patient Position: Sitting, Cuff Size: Adult Regular)   Pulse 84   Temp 97.6  F (36.4  C) (Temporal)   Resp 17   LMP 03/16/2024 (Exact Date)   SpO2 97%   BMI= There is no height or weight on file to calculate BMI.  Appears  to have mild upper respiratory infection but in no distress.  Physical exam is noteworthy for a right tympanic membrane that is erythematous especially in the superior quadrants.  Left TM is normal.  Neck is supple without lymphadenopathy.  Oropharynx is clear.    Cardiovascular-regular rate and rhythm without murmur    Lungs-clear to auscultation throughout    Results for orders placed or performed in visit on 03/25/24   Influenza A/B Antigen (Anza)     Status: Normal    Specimen: Nasopharyngeal; Swab   Result Value Ref Range    Influenza A antigen Negative Negative    Influenza B antigen Negative Negative    Narrative    Test results must be correlated with clinical data. If necessary, results should be confirmed by a molecular assay or viral culture.         SEE TOP OF NOTE FOR ASSESSMENT AND PLAN    --Zoran Hennessy MD  [facility], Department of Family Medicine and Community Health

## 2024-03-25 NOTE — PATIENT INSTRUCTIONS
ASSESSMENT and PLAN:     URI symptoms with RIGHT Otitis Media. Also, negative rapid Flu test    Check for Covid, RSV and flu by tri-screen  Also, treat for RIGHT Otitis Media with Augmentin 875 BID for 7 days  Plenty of fluids. Analgesics as needed and directed  Travel precautions discussed    Follow-up as needed    Zoran Hennessy MD  Family Medicine and Sports Medicine  [facility]

## 2024-03-29 DIAGNOSIS — I42.8 ARRHYTHMOGENIC LEFT VENTRICULAR DYSPLASIA (H): ICD-10-CM

## 2024-04-04 ENCOUNTER — MYC REFILL (OUTPATIENT)
Dept: CARDIOLOGY | Facility: CLINIC | Age: 44
End: 2024-04-04
Payer: COMMERCIAL

## 2024-04-04 DIAGNOSIS — I42.8 ARRHYTHMOGENIC LEFT VENTRICULAR DYSPLASIA (H): ICD-10-CM

## 2024-04-04 RX ORDER — METOPROLOL SUCCINATE 100 MG/1
100 TABLET, EXTENDED RELEASE ORAL DAILY
Qty: 90 TABLET | Refills: 3 | Status: SHIPPED | OUTPATIENT
Start: 2024-04-04

## 2024-04-04 RX ORDER — METOPROLOL SUCCINATE 100 MG/1
100 TABLET, EXTENDED RELEASE ORAL DAILY
Qty: 90 TABLET | Refills: 2 | OUTPATIENT
Start: 2024-04-04

## 2024-04-04 NOTE — TELEPHONE ENCOUNTER
metoprolol succinate ER (TOPROL XL) 100 MG 24 hr tablet       Last Written Prescription Date:  10/27/23  Last Fill Quantity: 90,   # refills: 0  Last Office Visit : 12/1/23  Future Office visit:  0    Routing refill request to provider for review/approval because:  Beta-Blockers Protocol Failed    Medication indicated for associated diagnosis    Medication is associated with one or more of the following diagnoses:                Hypertension (HTN)              Atrial fibrillation/flutter              Angina              ASCVD              Migraine              Heart Failure              Tremor              Anxiety              Ocular hypertension              Glaucoma

## 2024-04-04 NOTE — TELEPHONE ENCOUNTER
metoprolol succinate ER (TOPROL XL) 100 MG 24 hr tablet       Beta-Blockers Protocol Failed04/04/2024 06:43 AM   Protocol Details Medication indicated for associated diagnosis        Medication indicated for associated diagnosis    Medication is associated with one or more of the following diagnoses:                Hypertension (HTN)              Atrial fibrillation/flutter              Angina              ASCVD              Migraine              Heart Failure              Tremor              Anxiety              Ocular hypertension              Glaucoma

## 2024-06-21 ENCOUNTER — TRANSFERRED RECORDS (OUTPATIENT)
Dept: HEALTH INFORMATION MANAGEMENT | Facility: CLINIC | Age: 44
End: 2024-06-21
Payer: COMMERCIAL

## 2024-07-31 ENCOUNTER — ANCILLARY PROCEDURE (OUTPATIENT)
Dept: MAMMOGRAPHY | Facility: CLINIC | Age: 44
End: 2024-07-31
Attending: FAMILY MEDICINE
Payer: COMMERCIAL

## 2024-07-31 DIAGNOSIS — Z12.31 VISIT FOR SCREENING MAMMOGRAM: ICD-10-CM

## 2024-07-31 PROCEDURE — 77067 SCR MAMMO BI INCL CAD: CPT | Performed by: RADIOLOGY

## 2024-07-31 PROCEDURE — 77063 BREAST TOMOSYNTHESIS BI: CPT | Performed by: RADIOLOGY

## 2024-09-19 NOTE — NURSING NOTE
42 year old  Chief Complaint   Patient presents with     Vaginal Discharge     X 1.5 weeks, with milky color and odor        Blood pressure 103/69, pulse 67, temperature 97.5  F (36.4  C), temperature source Skin, resp. rate 12, weight 69.9 kg (154 lb), last menstrual period 10/10/2022, SpO2 96 %, not currently breastfeeding. Body mass index is 25.66 kg/m .  Patient Active Problem List   Diagnosis     Cardiomyopathy, familial (H)     Contact dermatitis     Keratosis pilaris     Arrhythmogenic left ventricular dysplasia (H)     Hypothyroidism     Hyperlipidemia LDL goal <130     Arrhythmogenic right ventricular cardiomyopathy (H)       Wt Readings from Last 2 Encounters:   10/20/22 69.9 kg (154 lb)   22 70.5 kg (155 lb 6.4 oz)     BP Readings from Last 3 Encounters:   10/20/22 103/69   22 97/67   22 119/75         Current Outpatient Medications   Medication     atorvastatin (LIPITOR) 20 MG tablet     Cholecalciferol (VITAMIN D3) 1000 units CAPS     diltiazem 2% in PLO gel     levothyroxine (SYNTHROID/LEVOTHROID) 200 MCG tablet     metoprolol succinate ER (TOPROL XL) 100 MG 24 hr tablet     metroNIDAZOLE (METROGEL) 1 % external gel     No current facility-administered medications for this visit.       Social History     Tobacco Use     Smoking status: Former     Years: 2.00     Types: Cigarettes     Quit date: 4/10/2001     Years since quittin.5     Smokeless tobacco: Never   Vaping Use     Vaping Use: Never used   Substance Use Topics     Alcohol use: Yes     Alcohol/week: 2.0 standard drinks     Types: 2 Standard drinks or equivalent per week     Drug use: No       Health Maintenance Due   Topic Date Due     ADVANCE CARE PLANNING  Never done     HEPATITIS B IMMUNIZATION (1 of 3 - 3-dose series) Never done     Pneumococcal Vaccine: Pediatrics (0 to 5 Years) and At-Risk Patients (6 to 64 Years) (1 - PCV) Never done     LIPID  10/05/2022     YEARLY PREVENTIVE VISIT  10/05/2022     TSH W/FREE  Received signed and completed FMLA form from Physician's Office, but question marked on disability form was not answered. Follow up sent to clinician's office for completion.      T4 REFLEX  10/05/2022       Lab Results   Component Value Date    PAP ASC-US 04/02/2018 October 20, 2022 8:04 AM

## 2024-10-15 DIAGNOSIS — I42.8 ARRHYTHMOGENIC RIGHT VENTRICULAR CARDIOMYOPATHY (H): ICD-10-CM

## 2024-10-15 DIAGNOSIS — I42.9 CARDIOMYOPATHY, UNSPECIFIED TYPE (H): Primary | ICD-10-CM

## 2024-11-24 DIAGNOSIS — E03.9 ACQUIRED HYPOTHYROIDISM: Primary | ICD-10-CM

## 2024-11-25 RX ORDER — LEVOTHYROXINE SODIUM 200 UG/1
TABLET ORAL
Qty: 30 TABLET | Refills: 0 | Status: SHIPPED | OUTPATIENT
Start: 2024-11-25

## 2024-11-25 NOTE — TELEPHONE ENCOUNTER
Medication requested: levothyroxine (SYNTHROID/LEVOTHROID) 175 MCG tablet   Last office visit: 3/25/2024  [facility] appointments: none  Medication last refilled: 3/24/2024; 90 tabs + 0 refills  Last qualifying labs:     Component      Latest Ref Rng 3/21/2024  2:58 PM   TSH      0.30 - 4.20 uIU/mL 0.05 (L)       Legend:  (L) Low    MC message sent to pt. Pt needs TSH lab. Medication is being filled for 1 time refill only due to:  Patient needs labs TSH.      SKIP WilkinsonN, RN  11/25/24, 11:00 AM  .

## 2024-12-15 ENCOUNTER — ORDERS ONLY (AUTO-RELEASED) (OUTPATIENT)
Dept: CARDIOLOGY | Facility: CLINIC | Age: 44
End: 2024-12-15
Payer: COMMERCIAL

## 2024-12-15 DIAGNOSIS — I42.8 ARRHYTHMOGENIC RIGHT VENTRICULAR CARDIOMYOPATHY (H): ICD-10-CM

## 2024-12-15 DIAGNOSIS — I42.9 CARDIOMYOPATHY, UNSPECIFIED TYPE (H): ICD-10-CM

## 2025-01-16 ENCOUNTER — OFFICE VISIT (OUTPATIENT)
Dept: FAMILY MEDICINE | Facility: CLINIC | Age: 45
End: 2025-01-16
Payer: COMMERCIAL

## 2025-01-16 VITALS
TEMPERATURE: 97.3 F | SYSTOLIC BLOOD PRESSURE: 106 MMHG | HEART RATE: 86 BPM | WEIGHT: 161.12 LBS | BODY MASS INDEX: 26.91 KG/M2 | DIASTOLIC BLOOD PRESSURE: 75 MMHG | OXYGEN SATURATION: 100 %

## 2025-01-16 DIAGNOSIS — N89.8 VAGINAL DISCHARGE: ICD-10-CM

## 2025-01-16 DIAGNOSIS — N95.8 GENITOURINARY SYNDROME OF MENOPAUSE: Primary | ICD-10-CM

## 2025-01-16 LAB
BACTERIAL VAGINOSIS VAG-IMP: NEGATIVE
CANDIDA DNA VAG QL NAA+PROBE: NOT DETECTED
CANDIDA GLABRATA / CANDIDA KRUSEI DNA: NOT DETECTED
T VAGINALIS DNA VAG QL NAA+PROBE: NOT DETECTED

## 2025-01-16 PROCEDURE — 81515 NFCT DS BV&VAGINITIS DNA ALG: CPT | Performed by: FAMILY MEDICINE

## 2025-01-16 RX ORDER — ESTRADIOL 0.1 MG/G
1 CREAM VAGINAL
Qty: 42.5 G | Refills: 3 | Status: SHIPPED | OUTPATIENT
Start: 2025-01-16

## 2025-01-16 NOTE — PROGRESS NOTES
CC: Vaginal Problem (Discharge, also not have many periods. Dryness with intercourse and odor.)      SUBJECTIVE: Maren is a 44 year old female with a history of recurrent BV who comes in with the following concerns:    Vaginal discharge  Vaginal dryness and pain with intercourse   Vaginal odor  Periods are spacing out   Uses boric acid occasionally   LMP December 2   Periods last year - Feb, March, April then not until August, then December   Mom went through menopause in her mid-40s   Light periods  Very occasional hot flash  No STI concerns - monogamous with        OBJECTIVE:   /75   Pulse 86   Temp 97.3  F (36.3  C)   Wt 73.1 kg (161 lb 1.9 oz)   SpO2 100%   BMI 26.91 kg/m    General: Alert and oriented in no acute distress.  Gyn: Normal appearing external genitalia without lesion.  Vaginal mucosa pink and moist.  White/yellow vaginal discharge noted.       ASSESSMENT/PLAN:   Maren was seen today for vaginal problem.    Genitourinary syndrome of menopause  Trial of Estrace vaginal cream for vaginal dryness and dyspareunia. I discussed with the patient the risks, benefits, and alternatives to taking this medication as well as common side effects.   -     estradiol (ESTRACE) 0.1 MG/GM vaginal cream; Place 1 g vaginally twice a week.    Vaginal discharge  With history of recurrent BV. Check vaginal panel PCR today.   -     Multiplex Vaginal Panel by PCR; Future  -     Multiplex Vaginal Panel by PCR    Worrisome signs and symptoms were discussed with Maern and she was instructed to return to the clinic for concerning symptoms or to call with questions.      Marcy Anthony MD  Family Medicine

## 2025-01-17 NOTE — PROGRESS NOTES
----- Message from AURA Naranjo sent at 1/17/2025  9:59 AM CST -----  Triglycerides have decreased from 1 year ago. HDL (high cholesterol) is low - watch diet and exercise.  MIcroalbumin normal - no evidence of kidney problems secondary to HTN  Normal kidney and liver function. Normal electrolytes.   Vit D normal  No prediabetes or diabetes   Mg normal   Patient called with results of recent testing. All questions answered.

## 2025-02-06 LAB — CV ZIO PRELIM RESULTS: NORMAL

## 2025-02-20 LAB — CV ZIO PRELIM RESULTS: NORMAL

## 2025-03-27 ENCOUNTER — LAB (OUTPATIENT)
Dept: LAB | Facility: CLINIC | Age: 45
End: 2025-03-27
Payer: COMMERCIAL

## 2025-03-27 DIAGNOSIS — E03.9 ACQUIRED HYPOTHYROIDISM: ICD-10-CM

## 2025-03-27 LAB — TSH SERPL DL<=0.005 MIU/L-ACNC: 0.89 UIU/ML (ref 0.3–4.2)

## 2025-03-27 PROCEDURE — 84443 ASSAY THYROID STIM HORMONE: CPT

## 2025-05-03 ENCOUNTER — HEALTH MAINTENANCE LETTER (OUTPATIENT)
Age: 45
End: 2025-05-03

## 2025-08-04 ENCOUNTER — ANCILLARY PROCEDURE (OUTPATIENT)
Dept: MAMMOGRAPHY | Facility: CLINIC | Age: 45
End: 2025-08-04
Attending: FAMILY MEDICINE
Payer: COMMERCIAL

## 2025-08-04 DIAGNOSIS — Z12.31 VISIT FOR SCREENING MAMMOGRAM: ICD-10-CM

## 2025-08-04 PROCEDURE — 77067 SCR MAMMO BI INCL CAD: CPT | Mod: GC | Performed by: STUDENT IN AN ORGANIZED HEALTH CARE EDUCATION/TRAINING PROGRAM

## 2025-08-04 PROCEDURE — 77063 BREAST TOMOSYNTHESIS BI: CPT | Mod: GC | Performed by: STUDENT IN AN ORGANIZED HEALTH CARE EDUCATION/TRAINING PROGRAM

## (undated) RX ORDER — LIDOCAINE HYDROCHLORIDE AND EPINEPHRINE 10; 10 MG/ML; UG/ML
INJECTION, SOLUTION INFILTRATION; PERINEURAL
Status: DISPENSED
Start: 2023-12-27

## (undated) RX ORDER — REGADENOSON 0.08 MG/ML
INJECTION, SOLUTION INTRAVENOUS
Status: DISPENSED
Start: 2018-06-22

## (undated) RX ORDER — LIDOCAINE HYDROCHLORIDE AND EPINEPHRINE 10; 10 MG/ML; UG/ML
INJECTION, SOLUTION INFILTRATION; PERINEURAL
Status: DISPENSED
Start: 2020-01-24